# Patient Record
Sex: FEMALE | Race: WHITE | NOT HISPANIC OR LATINO | Employment: OTHER | URBAN - METROPOLITAN AREA
[De-identification: names, ages, dates, MRNs, and addresses within clinical notes are randomized per-mention and may not be internally consistent; named-entity substitution may affect disease eponyms.]

---

## 2017-01-11 LAB — INR PPP: 1.7

## 2017-01-12 ENCOUNTER — ANTICOAGULATION VISIT (OUTPATIENT)
Dept: CARDIOLOGY | Facility: CLINIC | Age: 82
End: 2017-01-12

## 2017-01-12 DIAGNOSIS — I48.0 PAROXYSMAL ATRIAL FIBRILLATION (HCC): ICD-10-CM

## 2017-01-12 NOTE — PATIENT INSTRUCTIONS
Pt daughter advised to increase dose to 3.75mg SuTuThSa, and 2.5 mg MWF.  Recheck in one week.  Understanding verbalized.

## 2017-01-26 ENCOUNTER — ANTICOAGULATION VISIT (OUTPATIENT)
Dept: CARDIOLOGY | Facility: CLINIC | Age: 82
End: 2017-01-26

## 2017-01-26 LAB — INR PPP: 2

## 2017-02-09 ENCOUNTER — ANTICOAGULATION VISIT (OUTPATIENT)
Dept: CARDIOLOGY | Facility: CLINIC | Age: 82
End: 2017-02-09

## 2017-02-09 DIAGNOSIS — I48.91 ATRIAL FIBRILLATION, UNSPECIFIED TYPE (HCC): ICD-10-CM

## 2017-02-09 LAB — INR PPP: 2.5

## 2017-02-22 RX ORDER — WARFARIN SODIUM 2.5 MG/1
TABLET ORAL
Qty: 180 TABLET | Refills: 0 | Status: SHIPPED | OUTPATIENT
Start: 2017-02-22 | End: 2017-06-07 | Stop reason: SDUPTHER

## 2017-02-23 ENCOUNTER — ANTICOAGULATION VISIT (OUTPATIENT)
Dept: CARDIOLOGY | Facility: CLINIC | Age: 82
End: 2017-02-23

## 2017-02-23 LAB — INR PPP: 3.6

## 2017-03-02 ENCOUNTER — ANTICOAGULATION VISIT (OUTPATIENT)
Dept: CARDIOLOGY | Facility: CLINIC | Age: 82
End: 2017-03-02

## 2017-03-02 DIAGNOSIS — I48.91 ATRIAL FIBRILLATION, UNSPECIFIED TYPE (HCC): ICD-10-CM

## 2017-03-02 LAB — INR PPP: 2.4

## 2017-03-09 ENCOUNTER — ANTICOAGULATION VISIT (OUTPATIENT)
Dept: CARDIOLOGY | Facility: CLINIC | Age: 82
End: 2017-03-09

## 2017-03-09 LAB — INR PPP: 2.5

## 2017-03-17 NOTE — PROGRESS NOTES
Adjusted patient medication as indicated in the anticoagulation tracking system    Electronically signed by Yamilka Chisholm PA-C

## 2017-03-23 ENCOUNTER — ANTICOAGULATION VISIT (OUTPATIENT)
Dept: CARDIOLOGY | Facility: CLINIC | Age: 82
End: 2017-03-23

## 2017-03-23 LAB — INR PPP: 3.7

## 2017-03-30 ENCOUNTER — ANTICOAGULATION VISIT (OUTPATIENT)
Dept: CARDIOLOGY | Facility: CLINIC | Age: 82
End: 2017-03-30

## 2017-03-30 LAB — INR PPP: 2.9

## 2017-04-13 ENCOUNTER — ANTICOAGULATION VISIT (OUTPATIENT)
Dept: CARDIOLOGY | Facility: CLINIC | Age: 82
End: 2017-04-13

## 2017-04-13 LAB — INR PPP: 2.1

## 2017-05-04 ENCOUNTER — ANTICOAGULATION VISIT (OUTPATIENT)
Dept: CARDIOLOGY | Facility: CLINIC | Age: 82
End: 2017-05-04

## 2017-05-04 DIAGNOSIS — I48.91 ATRIAL FIBRILLATION, UNSPECIFIED TYPE (HCC): ICD-10-CM

## 2017-05-04 LAB — INR PPP: 1.3

## 2017-05-11 ENCOUNTER — ANTICOAGULATION VISIT (OUTPATIENT)
Dept: CARDIOLOGY | Facility: CLINIC | Age: 82
End: 2017-05-11

## 2017-05-11 LAB — INR PPP: 1.7

## 2017-05-25 ENCOUNTER — ANTICOAGULATION VISIT (OUTPATIENT)
Dept: CARDIOLOGY | Facility: CLINIC | Age: 82
End: 2017-05-25

## 2017-05-25 DIAGNOSIS — I48.91 ATRIAL FIBRILLATION, UNSPECIFIED TYPE (HCC): ICD-10-CM

## 2017-05-25 LAB — INR PPP: 2

## 2017-06-07 ENCOUNTER — ANTICOAGULATION VISIT (OUTPATIENT)
Dept: CARDIOLOGY | Facility: CLINIC | Age: 82
End: 2017-06-07

## 2017-06-07 ENCOUNTER — OFFICE VISIT (OUTPATIENT)
Dept: CARDIOLOGY | Facility: CLINIC | Age: 82
End: 2017-06-07

## 2017-06-07 VITALS
WEIGHT: 121.8 LBS | BODY MASS INDEX: 22.41 KG/M2 | HEART RATE: 77 BPM | DIASTOLIC BLOOD PRESSURE: 76 MMHG | SYSTOLIC BLOOD PRESSURE: 120 MMHG | HEIGHT: 62 IN

## 2017-06-07 DIAGNOSIS — I10 ESSENTIAL HYPERTENSION: ICD-10-CM

## 2017-06-07 DIAGNOSIS — I48.20 CHRONIC ATRIAL FIBRILLATION (HCC): Primary | ICD-10-CM

## 2017-06-07 DIAGNOSIS — I49.5 TACHY-BRADY SYNDROME (HCC): ICD-10-CM

## 2017-06-07 DIAGNOSIS — I48.91 ATRIAL FIBRILLATION, UNSPECIFIED TYPE (HCC): Primary | ICD-10-CM

## 2017-06-07 LAB — INR PPP: 1.7

## 2017-06-07 PROCEDURE — 99213 OFFICE O/P EST LOW 20 MIN: CPT | Performed by: INTERNAL MEDICINE

## 2017-06-07 PROCEDURE — 93288 INTERROG EVL PM/LDLS PM IP: CPT | Performed by: INTERNAL MEDICINE

## 2017-06-07 RX ORDER — WARFARIN SODIUM 2.5 MG/1
TABLET ORAL
Qty: 180 TABLET | Refills: 0 | Status: SHIPPED | OUTPATIENT
Start: 2017-06-07 | End: 2017-09-02 | Stop reason: SDUPTHER

## 2017-06-07 RX ORDER — CHOLECALCIFEROL (VITAMIN D3) 125 MCG
5 CAPSULE ORAL NIGHTLY PRN
COMMUNITY
End: 2019-01-28

## 2017-06-07 RX ORDER — LISINOPRIL AND HYDROCHLOROTHIAZIDE 20; 12.5 MG/1; MG/1
1 TABLET ORAL DAILY
COMMUNITY
End: 2019-01-16

## 2017-06-07 RX ORDER — LEVOTHYROXINE SODIUM 175 UG/1
175 TABLET ORAL DAILY
COMMUNITY
End: 2019-01-16

## 2017-06-07 RX ORDER — OMEPRAZOLE 20 MG/1
20 CAPSULE, DELAYED RELEASE ORAL DAILY
COMMUNITY

## 2017-06-07 RX ORDER — UBIDECARENONE 100 MG
100 CAPSULE ORAL DAILY
COMMUNITY

## 2017-06-07 RX ORDER — CARBOXYMETHYLCELLULOSE SODIUM 5 MG/ML
1 SOLUTION/ DROPS OPHTHALMIC 4 TIMES DAILY PRN
COMMUNITY

## 2017-06-07 RX ORDER — FERROUS SULFATE 325(65) MG
325 TABLET ORAL
COMMUNITY

## 2017-06-07 NOTE — PROGRESS NOTES
Adelaide Deutsch  6/11/1924  [phone]      06/07/2017    [facility] CARDIOLOGY     Kiana Alcocer MD  [address] [place] [address] [place] [id number]    Chief Complaint   Patient presents with   • Atrial Fibrillation   • Slow Heart Rate         PROBLEM LIST:  1. Tachybrady syndrome:  a. Holter monitor, 11/22/2006, showing heart rate ; average 59 BPM with 29 PVCs, 2,735 PACs, with short episodes of nonsustained atrial tachycardia.   b. Echocardiogram, 11/22/2006: Moderate LAE, EF 65%  c. Persantine 01/04/2007, low probability of ischemia, EF 68%.   d. Implantation of a dual-chamber permanent pacemaker, 07/12/2007 ([facility]).   e. Interrogation of pacemaker in September 2007, consistent with atrial fibrillation and atrial flutter with subsequent initiation of Coumadin and Toprol due to rapid ventricular rates.   f. Conversion to normal sinus rhythm with treatment of sotalol therapy, 10/19/2007.  g. Chronic atrial fibrillation with adequate ventricular rate control on metoprolol.   2. Hypertension.  3. Hyperlipidemia.   4. Anemia.   5. Migraines.   6. Arthritis.   7. Gastroesophageal reflux disease/hiatal hernia.   8. Hypothyroidism.   9. Osteoporosis.   10. Status post cataract removal.    Allergies  No Known Allergies    Current Medications    Current Outpatient Prescriptions:   •  carboxymethylcellulose (RETAINE CMC) 0.5 % solution, 3 (Three) Times a Day As Needed for Dry Eyes., Disp: , Rfl:   •  coenzyme Q10 100 MG capsule, Take 100 mg by mouth Daily., Disp: , Rfl:   •  Cyanocobalamin (VITAMIN B-12 IJ), Inject  as directed Every 30 (Thirty) Days., Disp: , Rfl:   •  ferrous sulfate 325 (65 FE) MG tablet, Take 325 mg by mouth Daily With Breakfast., Disp: , Rfl:   •  levothyroxine (SYNTHROID, LEVOTHROID) 175 MCG tablet, Take 175 mcg by mouth Daily., Disp: , Rfl:   •  lisinopril-hydrochlorothiazide (PRINZIDE,ZESTORETIC) 20-12.5 MG per tablet, Take 1 tablet by mouth Daily., Disp: , Rfl:   •   "melatonin 5 MG tablet tablet, Take 5 mg by mouth., Disp: , Rfl:   •  metoprolol tartrate (LOPRESSOR) 50 MG tablet, Take 1 tablet by mouth 2 (Two) Times a Day., Disp: 180 tablet, Rfl: 3  •  Multiple Vitamin (MULTI VITAMIN DAILY PO), Take  by mouth., Disp: , Rfl:   •  Multiple Vitamins-Minerals (PRESERVISION AREDS 2 PO), Take  by mouth., Disp: , Rfl:   •  omeprazole (priLOSEC) 20 MG capsule, Take 20 mg by mouth Daily., Disp: , Rfl:   •  warfarin (COUMADIN) 2.5 MG tablet, Take as directed, Disp: 180 tablet, Rfl: 0    History of Present Illness   HPI    Pt presents for follow up of chronic atrial fibrillation, tachy-beatriz syndrome, and PM check. Since we last saw the pt, she was in the hospital in April due to dizziness. Her medications were adjusted as her BP was low, and now she is back to baseline. Pt denies any AF episodes, SOB, CP, LH, and dizziness. Denies any  bleeding, or TIA/CVA symptoms. Her INRs are checked by Leena.     ROS:  General:  Denies fatigue, weight gain or loss  Cardiovascular:  Denies CP, PND, syncope, near syncope, edema or palpitations.  Pulmonary:  Denies WILLIS, cough, or wheezing      Vitals:    06/07/17 1357   BP: 120/76   BP Location: Right arm   Patient Position: Sitting   Pulse: 77   Weight: 121 lb 12.8 oz (55.2 kg)   Height: 62\" (157.5 cm)     PE:  General: NAD  Neck: no JVD, no carotid bruits, no TM  Heart RRR, NL S1, S2, S4 present, no rubs, murmurs  Lungs: CTA, no wheezes, rhonchi, or rales  Abd: soft, non-tender, NL BS  Ext: No musculoskeletal deformities, no edema, cyanosis, or clubbing  Psych: normal mood and affect    Diagnostic Data:  Procedures     PM check: normal function, 4.5 years on battery, adequate ventricular rates    1. Chronic atrial fibrillation    2. Tachy-beatriz syndrome    3. Essential hypertension          Plan:  1) Chronic atrial fibrillation: asymptomatic and rate controlled  Continue present medications.   2) Anticoagulation  Continue warfarin  3) HTN- actually has " become more hypotensive on medications, better off Clonidine.     F/up in 12 months    Scribed for Vadim Jarrett MD by Yamilka Chisholm PA-C. 6/7/2017  2:30 PM    I, LINA Li, personally performed the services face to face as described in this documentation and as scribed by the above named individual in my presence, and it is both accurate and complete.  6/7/2017  2:33 PM

## 2017-06-22 ENCOUNTER — ANTICOAGULATION VISIT (OUTPATIENT)
Dept: CARDIOLOGY | Facility: CLINIC | Age: 82
End: 2017-06-22

## 2017-06-22 LAB — INR PPP: 1.9

## 2017-07-06 ENCOUNTER — ANTICOAGULATION VISIT (OUTPATIENT)
Dept: CARDIOLOGY | Facility: CLINIC | Age: 82
End: 2017-07-06

## 2017-07-06 LAB — INR PPP: 2.2

## 2017-07-20 ENCOUNTER — ANTICOAGULATION VISIT (OUTPATIENT)
Dept: CARDIOLOGY | Facility: CLINIC | Age: 82
End: 2017-07-20

## 2017-07-20 DIAGNOSIS — I48.91 ATRIAL FIBRILLATION, UNSPECIFIED TYPE (HCC): ICD-10-CM

## 2017-07-20 LAB — INR PPP: 1.6

## 2017-07-27 ENCOUNTER — ANTICOAGULATION VISIT (OUTPATIENT)
Dept: CARDIOLOGY | Facility: CLINIC | Age: 82
End: 2017-07-27

## 2017-07-27 DIAGNOSIS — I48.91 ATRIAL FIBRILLATION, UNSPECIFIED TYPE (HCC): ICD-10-CM

## 2017-07-27 LAB — INR PPP: 2.1

## 2017-08-10 ENCOUNTER — ANTICOAGULATION VISIT (OUTPATIENT)
Dept: CARDIOLOGY | Facility: CLINIC | Age: 82
End: 2017-08-10

## 2017-08-10 DIAGNOSIS — I48.0 PAROXYSMAL ATRIAL FIBRILLATION (HCC): ICD-10-CM

## 2017-08-10 LAB — INR PPP: 2.3

## 2017-08-24 ENCOUNTER — ANTICOAGULATION VISIT (OUTPATIENT)
Dept: CARDIOLOGY | Facility: CLINIC | Age: 82
End: 2017-08-24

## 2017-08-24 LAB — INR PPP: 1.9

## 2017-09-02 DIAGNOSIS — I48.91 ATRIAL FIBRILLATION, UNSPECIFIED TYPE (HCC): ICD-10-CM

## 2017-09-05 RX ORDER — WARFARIN SODIUM 2.5 MG/1
TABLET ORAL
Qty: 180 TABLET | Refills: 0 | Status: SHIPPED | OUTPATIENT
Start: 2017-09-05 | End: 2018-05-23 | Stop reason: SDUPTHER

## 2017-09-07 ENCOUNTER — ANTICOAGULATION VISIT (OUTPATIENT)
Dept: PHARMACY | Facility: HOSPITAL | Age: 82
End: 2017-09-07

## 2017-09-07 LAB — INR PPP: 1.5

## 2017-09-07 NOTE — PROGRESS NOTES
Anticoagulation Clinic - Remote Progress Note  REMOTE LAB  Frequency of Monitoring (PST, ONLY): 14 days    Indication: Afib  Referring Provider: Luiza  Initial Warfarin Start Date:   Goal INR: 2-3  Current Drug Interactions: Omeprazole, MVI, CoQ10, Levothyroxine  CHADS-VASc:     Diet: Green beans, glenroy slaw each week  Alcohol: None  Tobacco: None  OTC Pain Medication: Tylenol    INR History:  Date 8/24 9/7          Total Weekly Dose 27.5 27.5          INR 1.9 1.5          Notes  First clinic             Phone Interview:  Tablet Strength: pt has 5mg tablet  Current Maintenance Dose: 5mg daily except 2.5 MWF     Patient Findings      Negatives Signs/symptoms of thrombosis, Signs/symptoms of bleeding, Laboratory test error suspected, Change in health, Change in alcohol use, Change in activity, Upcoming invasive procedure, Emergency department visit, Upcoming dental procedure, Missed doses, Extra doses, Change in medications, Change in diet/appetite, Hospital admission, Bruising, Other complaints     Comments Spoke with patient's daughter, Mikayla, whom does not report any changes in Vit K diet. She states it is possible that her mother may have missed a dose if it fell out of the pillbox or if she took it out and laid it somewhere else.        Patient Contact Info: Mikayla Johnson (daughter) # [phone]      Plan:  1. INR is SUBtherapeutic today. Spoke with patient's daughter, Mikayla, whom said she is not aware of any missed doses, however, it is possible. Given patient's age, hx of therapeutic INR on this dose, and pt due for higher dose tonight and through weekend, will instruct patient's daughter to continue 5mg daily except 2.5mg MWF. A boost would be an increase of 9.1%, given patient's age and hx, hesitant to do this at this time. Will consider dose increase if patient remains subtherapeutic next week.  2. Repeat INR on Wednesday 9/13.  3. Pt declines warfarin refills.  4. Verbal information provided over the  phone to daughter, Mikayla. Mikayla RBV dosing instructions, expresses understanding, and has no further questions at this time.    Rhonda Singh, PharmD  9/7/2017  3:20 PM

## 2017-09-13 ENCOUNTER — ANTICOAGULATION VISIT (OUTPATIENT)
Dept: PHARMACY | Facility: HOSPITAL | Age: 82
End: 2017-09-13

## 2017-09-13 LAB — INR PPP: 2.2

## 2017-09-13 NOTE — PROGRESS NOTES
Anticoagulation Clinic - Remote Progress Note  ALERE [facility] MONITOR  Frequency of Monitoring (PST, ONLY): 14 days    Indication: Afib  Referring Provider: Luiza  Initial Warfarin Start Date:   Goal INR: 2-3  Current Drug Interactions: Omeprazole, MVI, CoQ10, Levothyroxine  CHADS-VASc: 4 [HTN, Age>75, Female]    Diet: Green beans, glenroy slaw each week  Alcohol: None  Tobacco: None  OTC Pain Medication: Tylenol    INR History:  Date 8/24 9/7 9/13         Total Weekly Dose 27.5 27.5 27.5 mg         INR 1.9 1.5 2.2         Notes  First clinic             Phone Interview:  Tablet Strength: pt has 5mg tablet  Current Maintenance Dose: 5mg daily except 2.5 MWF  Patient Findings      Negatives Signs/symptoms of thrombosis, Signs/symptoms of bleeding, Laboratory test error suspected, Change in health, Change in alcohol use, Change in activity, Upcoming invasive procedure, Emergency department visit, Upcoming dental procedure, Missed doses, Extra doses, Change in medications, Change in diet/appetite, Hospital admission, Bruising, Other complaints     Comments Phone Interview with Patient's Daughter; She notes that she went back to last week's pillbox/records and saw that the patient had taken 2.5mg on Woodrow 9/3 instead of 5mg- likely contributed to subtherapeutic INR. Her dosage for this week has been corrected to what was instructed.     Patient Contact Info: Mikayla Johnson (daughter) # [phone]      Plan:  1. INR is therapeutic today (2.2). Instructed patient to continue warfarin 5mg daily except 2.5mg MWF.  2. Repeat INR in 2 weeks (closest possible for her is 9/28).  3. Pt declines warfarin refills.  4. Verbal information provided over the phone to daughterMikayla. Mikayla RBV dosing instructions, expresses understanding, and has no further questions at this time.    Maicol Sterling, Pharmacy Intern   Rhonda Singh, PharmD  9/13/2017  1:15 PM         IRhonda, PharmD, have reviewed the note in full and agree  with the assessment and plan.  09/13/17  4:49 PM

## 2017-09-28 ENCOUNTER — ANTICOAGULATION VISIT (OUTPATIENT)
Dept: PHARMACY | Facility: HOSPITAL | Age: 82
End: 2017-09-28

## 2017-09-28 LAB — INR PPP: 1.6

## 2017-09-28 NOTE — PROGRESS NOTES
Anticoagulation Clinic - Remote Progress Note  ALERE [facility] MONITOR  Frequency of Monitoring (PST, ONLY): 14 days    Indication: Afib  Referring Provider: Luiza  Initial Warfarin Start Date:   Goal INR: 2-3  Current Drug Interactions: Omeprazole, MVI, CoQ10, Levothyroxine  CHADS-VASc: 4 [HTN, Age>75, Female]    Diet: Green beans, glenroy slaw each week  Alcohol: None  Tobacco: None  OTC Pain Medication: Tylenol    INR History:  Date 8/24 9/7 9/13 9/28        Total Weekly Dose 27.5 27.5 27.5 mg 27.5        INR 1.9 1.5 2.2 1.6        Notes  First clinic   missed dose          Phone Interview:  Tablet Strength: pt has 5mg tablet  Current Maintenance Dose: 5mg daily except 2.5 MWF  Patient Findings      Negatives Signs/symptoms of thrombosis, Signs/symptoms of bleeding, Laboratory test error suspected, Change in health, Change in alcohol use, Change in activity, Upcoming invasive procedure, Emergency department visit, Upcoming dental procedure, Missed doses, Extra doses, Change in medications, Change in diet/appetite, Hospital admission, Bruising, Other complaints     Comments Phone Interview with Patient's Daughter; She reports that the patient missed a 5mg dose last Thursday and did not boost. Discussed that the patient can take a missed dose of warfarin within 12 hours of the missed dose, or can call the clinic if they have questions about missed dose. Patient's daughter received our telephone number and was encouraged to call with any changes in medications, questions or concerns.      Patient Contact Info: Mikayla Alex (daughter) # [phone]      Plan:  1. INR is SUBtherapeutic today (1.6). Instructed patient to take scheduled dose tonight 5mg then boost tomorrows dose to 3.75mg  (~4% increase) then continue warfarin 5mg daily except 2.5mg MWF.  2. Repeat INR in 1 week on 10/5.  3. Pt declines warfarin refills.  4. Verbal information provided over the phone to demetriceMikayla. Mikayla RBV dosing instructions,  expresses understanding, and has no further questions at this time.    Rhonda Singh, PharmD   9/28/2017  3:09 PM

## 2017-10-05 ENCOUNTER — ANTICOAGULATION VISIT (OUTPATIENT)
Dept: PHARMACY | Facility: HOSPITAL | Age: 82
End: 2017-10-05

## 2017-10-05 LAB — INR PPP: 2.4

## 2017-10-05 NOTE — PROGRESS NOTES
Anticoagulation Clinic - Remote Progress Note  ALERE [facility] MONITOR  Frequency of Monitoring (PST, ONLY): 14 days    Indication: Afib  Referring Provider: Luiza  Initial Warfarin Start Date:   Goal INR: 2-3  Current Drug Interactions: Omeprazole, MVI, CoQ10, Levothyroxine  CHADS-VASc: 4 [HTN, Age>75, Female]    Diet: Green beans, glenroy slaw each week  Alcohol: None  Tobacco: None  OTC Pain Medication: Tylenol    INR History:  Date 8/24 9/7 9/13 9/28 10/5       Total Weekly Dose 27.5 27.5 27.5 mg 27.5 28.5       INR 1.9 1.5 2.2 1.6 2.4       Notes  First clinic   missed dose          Phone Interview:  Tablet Strength: pt has 5mg tablet  Current Maintenance Dose: 5mg daily except 2.5 MWF  Patient Findings      Negatives Signs/symptoms of thrombosis, Signs/symptoms of bleeding, Laboratory test error suspected, Change in health, Change in alcohol use, Change in activity, Upcoming invasive procedure, Emergency department visit, Upcoming dental procedure, Missed doses, Extra doses, Change in medications, Change in diet/appetite, Hospital admission, Bruising, Other complaints     Patient Contact Info: Mikayla Johnson (daughter) # [phone]      Plan:  1. INR is therapeutic today (2.4). Instructed continue warfarin 5mg daily except 2.5mg MWF.  2. Repeat INR in 1 week on 10/12 to ensure WNL.  3. Pt declines warfarin refills.  4. Verbal information provided over the phone to daughterMikayla. Mikayla RBV dosing instructions, expresses understanding, and has no further questions at this time.    Rhonda Singh, PharmD   10/5/2017  3:59 PM

## 2017-10-12 ENCOUNTER — ANTICOAGULATION VISIT (OUTPATIENT)
Dept: PHARMACY | Facility: HOSPITAL | Age: 82
End: 2017-10-12

## 2017-10-12 LAB — INR PPP: 2.3

## 2017-10-12 NOTE — PROGRESS NOTES
Anticoagulation Clinic - Remote Progress Note  ALERE [facility] MONITOR  Frequency of Monitoring (PST, ONLY): 14 days    Indication: Afib  Referring Provider: Luiza  Initial Warfarin Start Date:   Goal INR: 2-3  Current Drug Interactions: Omeprazole, MVI, CoQ10, Levothyroxine  CHADS-VASc: 4 [HTN, Age>75, Female]    Diet: Green beans, glenroy slaw each week  Alcohol: None  Tobacco: None  OTC Pain Medication: Tylenol    INR History:  Date 8/24 9/7 9/13 9/28 10/5 10/12      Total Weekly Dose 27.5 mg 27.5 mg 27.5 mg 27.5  mg 28.5  mg 28.5 mg      INR 1.9 1.5 2.2 1.6 2.4 2.3      Notes  First clinic   missed dose          Phone Interview:  Tablet Strength: pt has 5mg tablet  Current Maintenance Dose: 5mg daily except 2.5mg MWF      Patient Findings      Positives Upcoming invasive procedure     Negatives Signs/symptoms of thrombosis, Signs/symptoms of bleeding, Laboratory test error suspected, Change in health, Change in alcohol use, Change in activity, Emergency department visit, Upcoming dental procedure, Missed doses, Extra doses, Change in medications, Change in diet/appetite, Hospital admission, Bruising, Other complaints     Comments Mrs. Deutsch is anticipating an eye procedure soon, however, she is waiting for the opthamologist to call and schedule her. Advised daughter to give them our phone number and for her to also call [facility] to let us know schedule and plan for procedure. At that point we can discuss if a warfarin dose hold is necessary and discuss this with the ophthalmologist and cardiologist.     Patient Contact Info: Mikayla Johnson (daughter) # [phone]      Plan:  1. INR is therapeutic today (2.3). Instructed continue warfarin 5mg daily except 2.5mg MWF.  2. Repeat INR in 2 weeks  3. Pt declines warfarin refills.  4. Verbal information provided over the phone to daughterMikayla. Mikayla RBV dosing instructions, expresses understanding, and has no further questions at this time.    [place]  HALI Valle, CHRIS  Rhonda Singh, PharmD  10/12/2017  1:15 PM      I, Rhonda Singh, PharmD, have reviewed the note in full and agree with the assessment and plan.  10/12/17  3:27 PM

## 2017-10-16 ENCOUNTER — TELEPHONE (OUTPATIENT)
Dept: PHARMACY | Facility: HOSPITAL | Age: 82
End: 2017-10-16

## 2017-10-16 NOTE — TELEPHONE ENCOUNTER
Called Dr. Fenton's office re: upcoming procedure. They recommend stopping warfarin 3 days prior to surgery, restarting that same night. CHADS VASc = 4, so pt should not require bridge therapy. Surgery is scheduled for Tuesday 10/31, so will instruct Mrs. Deutsch on the following dosing recommendations:    Saturday 10/28 - HOLD  [place] 10/29 - HOLD  Monday 10/30 - HOLD  Tuesday 10/31 - surgery, warfarin 5mg  Wednesday 11/1 - warfarin 5mg (BOOST)  Thursday 11/2 - warfarin 5mg  Friday 11/3 - warfarin 2.5mg (resume maintenance dosing)  Saturday 11/4 - 5mg  Sunday 11/5 - 5mg  Monday 11/6 - repeat INR    Will review plan with pt's daughter next week.    Ann Cowden Mayer, PharmD  10/16/2017  11:50 AM

## 2017-10-26 ENCOUNTER — ANTICOAGULATION VISIT (OUTPATIENT)
Dept: PHARMACY | Facility: HOSPITAL | Age: 82
End: 2017-10-26

## 2017-10-26 LAB — INR PPP: 2.1

## 2017-10-26 NOTE — PROGRESS NOTES
Anticoagulation Clinic - Remote Progress Note  ALERE HOME MONITOR  Frequency of Monitorin days    Indication: Afib  Referring Provider: Luiza  Initial Warfarin Start Date:   Goal INR: 2-3  Current Drug Interactions: Omeprazole, MVI, CoQ10, Levothyroxine  CHADS-VASc: 4 [HTN, Age>75, Female]    Diet: Green beans, glenroy slaw each week  Alcohol: None  Tobacco: None  OTC Pain Medication: Tylenol    INR History:  Date 8/24 9/7 9/13 9/28 10/5 10/12 10/26     Total Weekly Dose 27.5 mg 27.5 mg 27.5 mg 27.5  mg 28.5  mg 28.5 mg 28.5 mg     INR 1.9 1.5 2.2 1.6 2.4 2.3 2.1     Notes  First clinic   missed dose          Phone Interview:  Tablet Strength: pt has 5mg tablet  Current Maintenance Dose: 5mg daily except 2.5mg MWF  Patient Findings      Positives Upcoming invasive procedure, Change in diet/appetite     Negatives Signs/symptoms of thrombosis, Signs/symptoms of bleeding, Laboratory test error suspected, Change in health, Change in alcohol use, Change in activity, Emergency department visit, Upcoming dental procedure, Missed doses, Extra doses, Change in medications, Hospital admission, Bruising, Other complaints     Comments Having eye surgery with Dr. Fenton on 10/31, was told to stop warfarin on Sat 10/28, isn't sure when to start back. Will call [facility] office ([phone]) to see what the plan of action is.   Losing weight, PCP recommended drinking one Boost/Ensure a day       Patient Contact Info: Mikayla Alex (daughter) # [phone]      Plan:  1. INR is therapeutic today (2.1). Instructed continue warfarin 5mg daily except 2.5mg MWF.  2. Repeat INR in 2 weeks or sooner depending on plan of action post eye surgery.  3. Pt declines warfarin refills.  4. Verbal information provided over the phone to Mikayla suresh. Mikayla RBV dosing instructions, expresses understanding, and has no further questions at this time.    Loco Valle, Kendall  10/26/2017  1:52 PM    [place]:  Spoke with  Dr. Fenton's office -- Mrs. Deutsch will plan to follow the schedule below:  Saturday 10/28 - HOLD  [place] 10/29 - HOLD  Monday 10/30 - HOLD  Tuesday 10/31 - HOLD (procedure)  Wednesday 11/1 - warfarin 5mg (BOOST)  Thursday 11/2 - warfarin 5mg  Friday 11/3 - warfarin 5mg (BOOST)  Saturday 11/4 - 5mg  Sunday 11/5 - 5mg  Monday 11/6 - repeat INR, consider resumption of prior maintenance dose    ILeena, [facility], have reviewed the note in full and agree with the assessment and plan.  10/27/17  12:08 PM

## 2017-11-07 ENCOUNTER — ANTICOAGULATION VISIT (OUTPATIENT)
Dept: PHARMACY | Facility: HOSPITAL | Age: 82
End: 2017-11-07

## 2017-11-07 LAB — INR PPP: 1.7

## 2017-11-07 NOTE — PROGRESS NOTES
Anticoagulation Clinic - Remote Progress Note  ALERE HOME MONITOR  Frequency of Monitorin days    Indication: Afib  Referring Provider: Luiza  Initial Warfarin Start Date:   Goal INR: 2-3  Current Drug Interactions: Omeprazole, MVI, CoQ10, Levothyroxine  CHADS-VASc: 4 [HTN, Age>75, Female]    Diet: Green beans, glenroy slaw each week  Alcohol: None  Tobacco: None  OTC Pain Medication: Tylenol    INR History:  Date 8/24 9/7 9/13 9/28 10/5 10/12 10/26 10/7    Total Weekly Dose 27.5 mg 27.5 mg 27.5 mg 27.5  mg 28.5  mg 27.5 mg 27.5 mg 27.5mg    INR 1.9 1.5 2.2 1.6 2.4 2.3 2.1 1.7    Notes  First clinic   missed dose boost   Hold eye procedure      Phone Interview:  Tablet Strength: pt has 5mg tablet  Current Maintenance Dose: 5mg daily except 2.5mg MWF  Patient Findings      Positives Other complaints     Negatives Signs/symptoms of thrombosis, Signs/symptoms of bleeding, Laboratory test error suspected, Change in health, Change in alcohol use, Change in activity, Upcoming invasive procedure, Emergency department visit, Upcoming dental procedure, Missed doses, Extra doses, Change in medications, Change in diet/appetite, Hospital admission, Bruising     Comments Patient followed dose hold on 10/28 prior to eye surgery with Dr. Fenton on 10/31. Her daughter confirmed boosted dose after per dosing recommendations. Continues Boost drink once daily.     Patient Contact Info: Mikayla Johnson (daughter) # [phone]      Plan:  1. INR is SUBtherapeutic today (1.7)- following dose hold for procedure on 10/31. Given patient received boosted doses last week and chadsvasc= 4, will proceed cautiously due to age, therefore, instructed Mikayla to continue warfarin 5mg daily except 2.5mg MWF.   2. Repeat INR on Friday to ensure back WNL.  3. Pt declines warfarin refills.  4. Verbal information provided over the phone to daughter, Mikayla. Mikayla RBV dosing instructions, expresses understanding, and has no further questions at  this time.      Rhonda Singh, PharmD  11/7/2017  9:05 AM

## 2017-11-10 ENCOUNTER — ANTICOAGULATION VISIT (OUTPATIENT)
Dept: PHARMACY | Facility: HOSPITAL | Age: 82
End: 2017-11-10

## 2017-11-10 LAB — INR PPP: 2.1

## 2017-11-10 NOTE — PROGRESS NOTES
Anticoagulation Clinic - Remote Progress Note  ALERE HOME MONITOR  Frequency of Monitorin days    Indication: Afib  Referring Provider: Luiza  Initial Warfarin Start Date:   Goal INR: 2-3  Current Drug Interactions: Omeprazole, MVI, CoQ10, Levothyroxine  CHADS-VASc: 4 [HTN, Age>75, Female]    Diet: Green beans, glenroy slaw each week  Alcohol: None  Tobacco: None  OTC Pain Medication: Tylenol    INR History:  Date 8/24 9/7 9/13 9/28 10/5 10/12 10/26 11/6 11/10   Total Weekly Dose 27.5 mg 27.5 mg 27.5 mg 27.5  mg 28.5  mg 27.5 mg 27.5 mg 25mg 30mg   INR 1.9 1.5 2.2 1.6 2.4 2.3 2.1 1.7 2.1   Notes  First clinic   missed dose boost   Hold eye procedure      Date            Total Weekly Dose 27.5mg           INR            Notes              Phone Interview:  Tablet Strength: pt has 5mg tablet  Current Maintenance Dose: 5mg daily except 2.5mg MWF  Patient Findings      Negatives Signs/symptoms of thrombosis, Signs/symptoms of bleeding, Laboratory test error suspected, Change in health, Change in alcohol use, Change in activity, Upcoming invasive procedure, Emergency department visit, Upcoming dental procedure, Missed doses, Extra doses, Change in medications, Change in diet/appetite, Hospital admission, Bruising, Other complaints     Comments Spoke to Mrs. Deutsch's dtr Mikayla, Mrs. Deutsch is feeling pretty well after the procedure. Mikayla denied any recent diet or medication changes.     Patient Contact Info: Mikayla Johnson (daughter) # [phone]      Plan:  1. INR is therapeutic today (2.1)- instructed Mikayla to continue warfarin 5mg daily except 2.5mg MWF.   2. Repeat INR in 1 week on .  3. Pt declines warfarin refills.  4. Verbal information provided over the phone to Mikayla suresh. Mikayla RBV dosing instructions, expresses understanding with teach back, and has no further questions at this time.    Sam Ramos, Pharmacy Intern  11/10/2017  1:55 PM        Rhonda SHIN, PharmD, have reviewed the  note in full and agree with the assessment and plan.  11/10/17  3:00 PM

## 2017-11-16 ENCOUNTER — ANTICOAGULATION VISIT (OUTPATIENT)
Dept: PHARMACY | Facility: HOSPITAL | Age: 82
End: 2017-11-16

## 2017-11-16 LAB — INR PPP: 2.3

## 2017-11-16 NOTE — PROGRESS NOTES
Anticoagulation Clinic - Remote Progress Note  ALERE HOME MONITOR  Frequency of Monitorin days    Indication: Afib  Referring Provider: Luiza  Initial Warfarin Start Date:   Goal INR: 2-3  Current Drug Interactions: Omeprazole, MVI, CoQ10, Levothyroxine  CHADS-VASc: 4 [HTN, Age>75, Female]    Diet: Green beans, glenroy slaw each week  Alcohol: None  Tobacco: None  OTC Pain Medication: Tylenol    INR History:  Date 8/24 9/7 9/13 9/28 10/5 10/12 10/26 11/6 11/10   Total Weekly Dose 27.5 mg 27.5 mg 27.5 mg 27.5  mg 28.5  mg 27.5 mg 27.5 mg 25mg 30mg   INR 1.9 1.5 2.2 1.6 2.4 2.3 2.1 1.7 2.1   Notes  First clinic   missed dose boost   Hold eye procedure      Date            Total Weekly Dose 27.5mg           INR 2.3           Notes              Phone Interview:  Tablet Strength: pt has 5mg tablet  Current Maintenance Dose: 5mg daily except 2.5mg MWF  Patient Findings      Negatives Signs/symptoms of thrombosis, Signs/symptoms of bleeding, Laboratory test error suspected, Change in health, Change in alcohol use, Change in activity, Upcoming invasive procedure, Emergency department visit, Upcoming dental procedure, Missed doses, Extra doses, Change in medications, Change in diet/appetite, Hospital admission, Bruising, Other complaints         Patient Contact Info: Mikayla Johnson (daughter) # [phone]      Plan:  1. INR is therapeutic today (2.3)- instructed Mikayla to continue warfarin 5mg daily except 2.5mg MWF.   2. Repeat INR in 2 weeks on .  3. Pt declines warfarin refills.  4. Verbal information provided over the phone to Mikayla suresh. Mikayla RBV dosing instructions, expresses understanding with teach back, and has no further questions at this time.    Sam Ramos, Pharmacy Intern  2017  1:51 PM      Rhonda SHIN, PharmD, have reviewed the note in full and agree with the assessment and plan.  17  10:04 AM

## 2017-11-30 ENCOUNTER — ANTICOAGULATION VISIT (OUTPATIENT)
Dept: PHARMACY | Facility: HOSPITAL | Age: 82
End: 2017-11-30

## 2017-11-30 LAB — INR PPP: 1.5

## 2017-11-30 NOTE — PROGRESS NOTES
"Anticoagulation Clinic - Remote Progress Note  [employer]  Frequency of Monitorin days    Indication: Afib  Referring Provider: Luiza  Initial Warfarin Start Date:   Goal INR: 2-3  Current Drug Interactions: Omeprazole, MVI, CoQ10, Levothyroxine  CHADS-VASc: 4 [HTN, Age>75, Female]    Diet: Green beans, glenroy slaw each week  Alcohol: None  Tobacco: None  OTC Pain Medication: Tylenol    INR History:  Date 8/24 9/7 9/13 9/28 10/5 10/12 10/26 11/6 11/10   Total Weekly Dose 27.5 mg 27.5 mg 27.5 mg 27.5  mg 28.5  mg 27.5 mg 27.5 mg 25mg 30mg   INR 1.9 1.5 2.2 1.6 2.4 2.3 2.1 1.7 2.1   Notes  First clinic   missed dose boost   Hold eye procedure      Date           Total Weekly Dose 27.5mg 27.5?          INR 2.3 1.5          Notes  doubled up? Held x1 boost           Phone Interview:  Tablet Strength: pt has 5mg tablet  Current Maintenance Dose: 5mg daily except 2.5mg MWF  Patient Findings      Positives Missed doses, Extra doses     Negatives Signs/symptoms of thrombosis, Signs/symptoms of bleeding, Laboratory test error suspected, Change in health, Change in alcohol use, Change in activity, Upcoming invasive procedure, Emergency department visit, Upcoming dental procedure, Change in diet/appetite, Hospital admission, Bruising, Other complaints     Comments Spoken to pt's daughter Mikayla over the phone. Mikayla mentioned that when she checked pt's pill box on  night (), both the medication for  and  were gone. She suspected that patient might have mixed up the medications and took both days' supply at the same time, but she could not confirm that's what happened. Subsequently she held pt's warfarin dose on .       Patient Contact Info: Mikayla Johnson (daughter) # [phone]      Plan:  1. INR is subtherapeutic today (1.5) followed by \"doubled up\" dose and held dose x1. Instructed Mikayla to give Mrs. Deutsch 5mg tonight and tomorrow, (9.1% increase) and then continue " warfarin 5mg daily except 2.5mg MWF.   2. Repeat INR in 1 week on 12/5.  3. Mikayla declines warfarin refills.  4. Verbal information provided over the phone to daughter, Mikayal. Mikayla RBV dosing instructions, expresses understanding with teach back, and has no further questions at this time.    Sam Ramos, Pharmacy Intern  11/30/2017  11:26 AM      I, Rhonda Singh, PharmD, have reviewed the note in full and agree with the assessment and plan.  11/30/17  1:39 PM

## 2017-12-05 ENCOUNTER — ANTICOAGULATION VISIT (OUTPATIENT)
Dept: PHARMACY | Facility: HOSPITAL | Age: 82
End: 2017-12-05

## 2017-12-05 DIAGNOSIS — I48.91 ATRIAL FIBRILLATION, UNSPECIFIED TYPE (HCC): Primary | ICD-10-CM

## 2017-12-05 LAB — INR PPP: 3.19

## 2017-12-05 NOTE — PROGRESS NOTES
Anticoagulation Clinic - Remote Progress Note  [employer]  Frequency of Monitorin days    Indication: Afib  Referring Provider: Luiza  Initial Warfarin Start Date:   Goal INR: 2-3  Current Drug Interactions: Omeprazole, MVI, CoQ10, Levothyroxine  CHADS-VASc: 4 [HTN, Age>75, Female]    Diet: Green beans, glenroy slaw each week  Alcohol: None  Tobacco: None  OTC Pain Medication: Tylenol    INR History:  Date 8/24 9/7 9/13 9/28 10/5 10/12 10/26 11/6 11/10   Total Weekly Dose 27.5 mg 27.5 mg 27.5 mg 27.5  mg 28.5  mg 27.5 mg 27.5 mg 25mg 30mg   INR 1.9 1.5 2.2 1.6 2.4 2.3 2.1 1.7 2.1   Notes  First clinic   missed dose boost   Hold eye procedure      Date          Total Weekly Dose 27.5mg 27.5mg? 30 mg 27.5 mg        INR 2.3 1.5 3.19         Notes  doubled up? held x1 boost           Phone Interview:  Tablet Strength: pt has 5mg tablet  Current Maintenance Dose: 5mg daily except 2.5mg MWF    Patient Contact Info: Mikayla Johnson (daughter) # [phone]      Plan:  1. INR is slightly supratherapeutic today.  Instructed Mikayla to have her mother eat some GLV tonight (broccoli and salad) and continue prior maintenance dose, warfarin 5mg daily except 2.5mg MWF for now. INR increased significantly since last week, but Mrs. Deutsch missed one dose and has been stable on current maintenance dose previously.  2. Repeat INR in 1 week to ensure it stabilizes.  3. Verbal information provided over the phone to daughter, Mikayla. Mikayla RBV dosing instructions, expresses understanding with teach back, and has no further questions at this time.    Leena Davis [facility]  2017  3:25 PM

## 2017-12-11 ENCOUNTER — ANTICOAGULATION VISIT (OUTPATIENT)
Dept: PHARMACY | Facility: HOSPITAL | Age: 82
End: 2017-12-11

## 2017-12-11 LAB — INR PPP: 2.7

## 2017-12-11 NOTE — PROGRESS NOTES
Anticoagulation Clinic - Remote Progress Note  [employer]  Frequency of Monitorin days    Indication: Afib  Referring Provider: Luiza  Initial Warfarin Start Date:   Goal INR: 2-3  Current Drug Interactions: Omeprazole, MVI, CoQ10, Levothyroxine  CHADS-VASc: 4 [HTN, Age>75, Female]    Diet: Green beans, glenroy slaw each week  Alcohol: None  Tobacco: None  OTC Pain Medication: Tylenol    INR History:  Date 8/24 9/7 9/13 9/28 10/5 10/12 10/26 11/6 11/10   Total Weekly Dose 27.5 mg 27.5 mg 27.5 mg 27.5  mg 28.5  mg 27.5 mg 27.5 mg 25mg 30mg   INR 1.9 1.5 2.2 1.6 2.4 2.3 2.1 1.7 2.1   Notes  First clinic   missed dose boost   Hold eye procedure      Date         Total Weekly Dose 27.5mg 27.5mg? 30 mg 27.5 mg        INR 2.3 1.5 3.19 2.7        Notes  doubled up? held x1 boost           Phone Interview:  Tablet Strength: pt has 5mg tablet  Current Maintenance Dose: 5mg daily except 2.5mg MWF  Patient Findings      Positives Missed doses, Extra doses     Negatives Signs/symptoms of thrombosis, Signs/symptoms of bleeding, Laboratory test error suspected, Change in health, Change in alcohol use, Change in activity, Upcoming invasive procedure, Emergency department visit, Upcoming dental procedure, Change in medications, Change in diet/appetite, Hospital admission, Bruising, Other complaints     Comments Patient's daughter Mikayla disclosed that the patient missed a dose of warfarin last night and only took 2.5mg. Mikayla has already given her 5mg today to offset it. Discussed that if patient misses a dose within 12 hr can take it, however, typically don't double up doses.     Patient Contact Info: Mikayla Johnson (daughter) # [phone]      Plan:  1. INR is therapeutic today.  Given patient has already received 5mg today, instructed Mikayla to continue prior maintenance dose, warfarin 5mg daily except 2.5mg MWF for now.   2. Repeat INR in 1.5 weeks to ensure WNL.  3. Verbal information  provided over the phone to daughter, Mikayla. Mikayla RBV dosing instructions, expresses understanding with teach back, and has no further questions at this time.    Rhonda Singh, PharmD  12/11/2017  2:55 PM

## 2017-12-20 ENCOUNTER — ANTICOAGULATION VISIT (OUTPATIENT)
Dept: PHARMACY | Facility: HOSPITAL | Age: 82
End: 2017-12-20

## 2017-12-20 LAB — INR PPP: 3

## 2017-12-20 NOTE — PROGRESS NOTES
Anticoagulation Clinic - Remote Progress Note  [employer]  Frequency of Monitorin days    Indication: Afib  Referring Provider: Luiza  Initial Warfarin Start Date:   Goal INR: 2-3  Current Drug Interactions: Omeprazole, MVI, CoQ10, Levothyroxine  CHADS-VASc: 4 [HTN, Age>75, Female]    Diet: Green beans, glenroy slaw each week  Alcohol: None  Tobacco: None  OTC Pain Medication: Tylenol    INR History:  Date 8/24 9/7 9/13 9/28 10/5 10/12 10/26 11/6 11/10   Total Weekly Dose 27.5 mg 27.5 mg 27.5 mg 27.5  mg 28.5  mg 27.5 mg 27.5 mg 25mg 30mg   INR 1.9 1.5 2.2 1.6 2.4 2.3 2.1 1.7 2.1   Notes  First clinic   missed dose boost   Hold eye procedure      Date        Total Weekly Dose 27.5mg 27.5mg? 30 mg 27.5 mg 27.5mg       INR 2.3 1.5 3.19 2.7 3.0       Notes  doubled up? held x1 boost           Phone Interview:  Tablet Strength: pt has 5mg tablet  Current Maintenance Dose: 5mg daily except 2.5mg MWF      Patient Findings      Negatives Signs/symptoms of thrombosis, Signs/symptoms of bleeding, Laboratory test error suspected, Change in health, Change in alcohol use, Change in activity, Upcoming invasive procedure, Emergency department visit, Upcoming dental procedure, Missed doses, Extra doses, Change in medications, Change in diet/appetite, Hospital admission, Bruising, Other complaints   Patient Contact Info: Mikayla Johnson (daughter) # [phone]      Plan:  1. INR is therapeutic today at 3.0. Instructed patient's daughter to have patient continue warfarin 5mg daily except 2.5mg MWF   2. Repeat INR in 2 weeks  3. Verbal information provided over the phone to daughterMikayla. Mikayla RBV dosing instructions, expresses understanding with teach back, and has no further questions at this time.  4. Patient's daughter declines warfarin refills     Jodi Bello, Pharmacy Technician  2017  1:32 PM       Rhonda SHIN, PharmD, have reviewed the note in full and  agree with the assessment and plan.  12/20/17  2:51 PM

## 2017-12-26 DIAGNOSIS — I49.5 TACHY-BRADY SYNDROME (HCC): ICD-10-CM

## 2017-12-26 RX ORDER — METOPROLOL TARTRATE 50 MG/1
TABLET, FILM COATED ORAL
Qty: 180 TABLET | Refills: 2 | Status: SHIPPED | OUTPATIENT
Start: 2017-12-26 | End: 2018-05-20 | Stop reason: SDUPTHER

## 2018-01-03 ENCOUNTER — ANTICOAGULATION VISIT (OUTPATIENT)
Dept: PHARMACY | Facility: HOSPITAL | Age: 83
End: 2018-01-03

## 2018-01-03 LAB — INR PPP: 3

## 2018-01-03 NOTE — PROGRESS NOTES
Anticoagulation Clinic - Remote Progress Note  [employer]  Frequency of Monitorin days    Indication: Afib  Referring Provider: Luiza  Initial Warfarin Start Date:   Goal INR: 2-3  Current Drug Interactions: Omeprazole, MVI, CoQ10, Levothyroxine  CHADS-VASc: 4 [HTN, Age>75, Female]    Diet: Green beans, glenroy slaw each week  Alcohol: None  Tobacco: None  OTC Pain Medication: Tylenol    INR History:  Date 8/24 9/7 9/13 9/28 10/5 10/12 10/26 11/6 11/10   Total Weekly Dose 27.5 mg 27.5 mg 27.5 mg 27.5  mg 28.5  mg 27.5 mg 27.5 mg 25mg 30mg   INR 1.9 1.5 2.2 1.6 2.4 2.3 2.1 1.7 2.1   Notes  First clinic   missed dose boost   Hold eye procedure      Date 11/16 11/30 12/5 12/11 12/20 1/3      Total Weekly Dose 27.5mg 27.5mg? 30 mg 27.5 mg 27.5mg 27.5  mg      INR 2.3 1.5 3.19 2.7 3.0 3.0      Notes  doubled up? held x1 boost           Phone Interview:  Tablet Strength: pt has 5mg tablet  Current Maintenance Dose: 5mg daily except 2.5mg MWF  Patient Contact Info: Mikayla Johnson (daughter) # [phone]    Patient Findings      Negatives Signs/symptoms of thrombosis, Signs/symptoms of bleeding, Laboratory test error suspected, Change in health, Change in alcohol use, Change in activity, Upcoming invasive procedure, Emergency department visit, Upcoming dental procedure, Missed doses, Extra doses, Change in medications, Change in diet/appetite, Hospital admission, Bruising, Other complaints   Plan:  1. INR is therapeutic today at 3.0. Instructed patient's daughter to have patient continue warfarin 5mg daily except 2.5mg MWF   2. Repeat INR in 2 weeks. ()  3. Verbal information provided over the phone to daughterMikayla. Mikayla RBV dosing instructions, expresses understanding with teach back, and has no further questions at this time.  4. Patient's daughter declines warfarin refills     Jodi Bello, Pharmacy Technician  1/3/2018  10:22 AM     I, Rhonda Singh, PharmD, have reviewed the note in  full and agree with the assessment and plan.  01/03/18  4:20 PM

## 2018-01-18 ENCOUNTER — ANTICOAGULATION VISIT (OUTPATIENT)
Dept: PHARMACY | Facility: HOSPITAL | Age: 83
End: 2018-01-18

## 2018-01-18 LAB — INR PPP: 2.7

## 2018-01-18 NOTE — PROGRESS NOTES
Anticoagulation Clinic - Remote Progress Note  [employer]  Frequency of Monitorin days    Indication: Afib  Referring Provider: Luiza  Initial Warfarin Start Date:   Goal INR: 2-3  Current Drug Interactions: Omeprazole, MVI, CoQ10, Levothyroxine  CHADS-VASc: 4 [HTN, Age>75, Female]    Diet: Green beans, glenroy slaw each week  Alcohol: None  Tobacco: None  OTC Pain Medication: Tylenol    INR History:  Date 8/24 9/7 9/13 9/28 10/5 10/12 10/26 11/6 11/10   Total Weekly Dose 27.5 mg 27.5 mg 27.5 mg 27.5  mg 28.5  mg 27.5 mg 27.5 mg 25mg 30mg   INR 1.9 1.5 2.2 1.6 2.4 2.3 2.1 1.7 2.1   Notes  First clinic   missed dose boost   Hold eye procedure      Date 11/16 11/30 12/5 12/11 12/20 1/3/18 1/18     Total Weekly Dose 27.5mg 27.5mg? 30 mg 27.5 mg 27.5 mg 27.5  mg 27.5 mg       INR 2.3 1.5 3.19 2.7 3.0 3.0 2.7     Notes  doubled up? held x1 boost           Phone Interview:  Tablet Strength: pt has 5mg tablet  Current Maintenance Dose: 5mg daily except 2.5mg MonWedFri      Patient Contact Info: Mikayla Johnson (daughter) # [phone]    Lab Contact Info: Traci    Plan:  1. INR is therapeutic today (2.7). Instructed patient's daughter to have patient continue warfarin 5mg daily except 2.5mg MonWedFri   2. Repeat INR in 2 weeks. Mikayla has doctor's appointments on  so it may be either  or  when she retests her mother's INR.  3. Verbal information provided over the phone to daughterMikayla. Mikayla RBV dosing instructions, expresses understanding with teach back, and has no further questions at this time.  4. Patient's daughter declines warfarin refills     Loco Valle CPhT  2018  2:59 PM    I, Loco Freedman [facility], have reviewed the note in full and agree with the assessment and plan.  18  9:52 AM

## 2018-01-31 LAB — INR PPP: 2.5

## 2018-02-01 ENCOUNTER — ANTICOAGULATION VISIT (OUTPATIENT)
Dept: PHARMACY | Facility: HOSPITAL | Age: 83
End: 2018-02-01

## 2018-02-01 DIAGNOSIS — I48.91 ATRIAL FIBRILLATION, UNSPECIFIED TYPE (HCC): ICD-10-CM

## 2018-02-01 NOTE — PROGRESS NOTES
Anticoagulation Clinic - Remote Progress Note  [employer]  Frequency of Monitorin days    Indication: Afib  Referring Provider: Luiza  Initial Warfarin Start Date:   Goal INR: 2-3  Current Drug Interactions: Omeprazole, MVI, CoQ10, Levothyroxine  CHADS-VASc: 4 [HTN, Age>75, Female]    Diet: Green beans, glenroy slaw each week  Alcohol: None  Tobacco: None  OTC Pain Medication: Tylenol    INR History:  Date 8/24 9/7 9/13 9/28 10/5 10/12 10/26 11/6 11/10   Total Weekly Dose 27.5 mg 27.5 mg 27.5 mg 27.5  mg 28.5  mg 27.5 mg 27.5 mg 25mg 30mg   INR 1.9 1.5 2.2 1.6 2.4 2.3 2.1 1.7 2.1   Notes  First clinic   missed dose boost   Hold eye procedure      Date 11/16 11/30 12/5 12/11 12/20 1/3/18 1/18 1/31    Total Weekly Dose 27.5mg 27.5mg? 30 mg 27.5 mg 27.5 mg 27.5  mg 27.5 mg   27.5mg    INR 2.3 1.5 3.19 2.7 3.0 3.0 2.7 2.5    Notes  doubled up? held x1 boost           Phone Interview:  Tablet Strength: pt has 5mg tablet  Current Maintenance Dose: 5mg daily except 2.5mg MonWedFri    Patient Findings      Negatives Signs/symptoms of thrombosis, Signs/symptoms of bleeding, Laboratory test error suspected, Change in health, Change in alcohol use, Change in activity, Upcoming invasive procedure, Emergency department visit, Upcoming dental procedure, Missed doses, Extra doses, Change in medications, Change in diet/appetite, Hospital admission, Bruising, Other complaints     Patient Contact Info: Mikayla Alex (daughter) # [phone]    Lab Contact Info: Traci    Plan:  1. INR was therapeutic on  at 2.5 Instructed patient's daughter to have patient continue warfarin 5mg daily except 2.5mg MonWedFri   2. Repeat INR in 2 weeks. ()  3. Verbal information provided over the phone to daughter, Mikayla. Mikayla RBV dosing instructions, expresses understanding with teach back, and has no further questions at this time.  4. Patient's daughter declines the need for warfarin refills at this time.     Jodi HANDLEY  [place]  2/1/2018  8:19 AM    I, Meredith Riddle, [facility], have reviewed the note in full and agree with the assessment and plan.  02/01/18  1:20 PM

## 2018-02-14 ENCOUNTER — ANTICOAGULATION VISIT (OUTPATIENT)
Dept: PHARMACY | Facility: HOSPITAL | Age: 83
End: 2018-02-14

## 2018-02-14 LAB — INR PPP: 3.5

## 2018-02-14 NOTE — PROGRESS NOTES
Anticoagulation Clinic - Remote Progress Note  [employer]  Frequency of Monitorin days    Indication: Afib  Referring Provider: Luiza  Initial Warfarin Start Date:   Goal INR: 2-3  Current Drug Interactions: Omeprazole, MVI, CoQ10, Levothyroxine  CHADS-VASc: 4 [HTN, Age>75, Female]    Diet: Green beans, glenroy slaw each week  Alcohol: None  Tobacco: None  OTC Pain Medication: Tylenol    INR History:  Date 8/24 9/7 9/13 9/28 10/5 10/12 10/26 11/6 11/10   Total Weekly Dose 27.5 mg 27.5 mg 27.5 mg 27.5  mg 28.5  mg 27.5 mg 27.5 mg 25mg 30mg   INR 1.9 1.5 2.2 1.6 2.4 2.3 2.1 1.7 2.1   Notes  First clinic   missed dose boost   Hold eye procedure      Date 11/16 11/30 12/5 12/11 12/20 1/3/18 1/18 1/31 2/14    Total Weekly Dose 27.5mg 27.5mg? 30 mg 27.5 mg 27.5 mg 27.5  mg 27.5 mg   27.5mg 27.5mg 25mg   INR 2.3 1.5 3.19 2.7 3.0 3.0 2.7 2.5 3.5    Notes  doubled up? held x1 boost            Phone Interview:  Tablet Strength: pt has 5mg tablet  Current Maintenance Dose: 5mg daily except 2.5mg MonWedFri    Patient Findings      Negatives Signs/symptoms of thrombosis, Signs/symptoms of bleeding, Laboratory test error suspected, Change in health, Change in alcohol use, Change in activity, Upcoming invasive procedure, Emergency department visit, Upcoming dental procedure, Missed doses, Extra doses, Change in medications, Change in diet/appetite, Hospital admission, Bruising, Other complaints     Patient Contact Info: Mikayla Alex (daughter) # [phone]    Lab Contact Info: Traci    Plan:  1. INR is supratherapeutic today at 3.5. After conferring with a pharmacist, I instructed patient's daughter to have patient hold tonight's dose (~9% decrease), then continue warfarin 5mg daily except 2.5mg MonWedFri.   2. Repeat INR in 1 week.  3. Verbal information provided over the phone to daughterMikayla. Mikayla RBV dosing instructions, expresses understanding with teach back, and has no further questions at this  time.  4. Patient's daughter declines the need for warfarin refills at this time.     Jodi Bello  2/14/2018  3:48 PM      IRhonda, PharmD, have reviewed the note in full and agree with the assessment and plan.  02/14/18  5:22 PM

## 2018-02-21 ENCOUNTER — ANTICOAGULATION VISIT (OUTPATIENT)
Dept: PHARMACY | Facility: HOSPITAL | Age: 83
End: 2018-02-21

## 2018-02-21 LAB — INR PPP: 2.2

## 2018-02-21 NOTE — PROGRESS NOTES
Anticoagulation Clinic - Remote Progress Note  [employer]  Frequency of Monitorin days    Indication: Afib  Referring Provider: Luiza  Initial Warfarin Start Date:   Goal INR: 2-3  Current Drug Interactions: Omeprazole, MVI, CoQ10, Levothyroxine  CHADS-VASc: 4 [HTN, Age>75, Female]    Diet: Green beans, glenroy slaw each week  Alcohol: None  Tobacco: None  OTC Pain Medication: Tylenol    INR History:  Date 8/24 9/7 9/13 9/28 10/5 10/12 10/26 11/6 11/10   Total Weekly Dose 27.5 mg 27.5 mg 27.5 mg 27.5  mg 28.5  mg 27.5 mg 27.5 mg 25mg 30mg   INR 1.9 1.5 2.2 1.6 2.4 2.3 2.1 1.7 2.1   Notes  First clinic   missed dose boost   Hold eye procedure      Date 11/16 11/30 12/5 12/11 12/20 1/3/18 1/18 1/31 2/14 2/21    Total Weekly Dose 27.5mg 27.5mg? 30 mg 27.5 mg 27.5 mg 27.5  mg 27.5 mg   27.5mg 27.5mg 25mg 27.5mg   INR 2.3 1.5 3.19 2.7 3.0 3.0 2.7 2.5 3.5 2.2    Notes  doubled up? held x1 boost             Phone Interview:  Tablet Strength: pt has 5mg tablet  Current Maintenance Dose: 5mg daily except 2.5mg MonWedFri      Patient Contact Info: Mikayla Johnson (daughter) # [phone]    Lab Contact Info: Traci    Plan:  1. INR is supratherapeutic today at 3.5. Will instruct patient's daughter to have patient return to maintenance dose of warfarin 5mg daily except 2.5mg MonWedFri.   2. Left voice message for patient's daughter, Mikayla, on     Jodi Bello  2018  3:37 PM

## 2018-02-22 NOTE — PROGRESS NOTES
Anticoagulation Clinic - Remote Progress Note  [employer]  Frequency of Monitorin days    Indication: Afib  Referring Provider: Luiza  Initial Warfarin Start Date:   Goal INR: 2-3  Current Drug Interactions: Omeprazole, MVI, CoQ10, Levothyroxine  CHADS-VASc: 4 [HTN, Age>75, Female]    Diet: Green beans, glenroy slaw each week  Alcohol: None  Tobacco: None  OTC Pain Medication: Tylenol    INR History:  Date 8/24 9/7 9/13 9/28 10/5 10/12 10/26 11/6 11/10 11/16 11/30 12/5   Total Weekly Dose 27.5 mg 27.5 mg 27.5 mg 27.5  mg 28.5  mg 27.5 mg 27.5 mg 25mg 30mg 27.5 mg 27.5mg? 30mg   INR 1.9 1.5 2.2 1.6 2.4 2.3 2.1 1.7 2.1 2.3 1.5 3.19   Notes  First clinic   missed dose boost   Hold eye procedure   doubled up? held x1 boost     Date 12/11 12/20 1/3/18 1/18 1/31 2/14 2/22         Total Weekly Dose 27.5 mg 27.5 mg 27.5  mg 27.5 mg   27.5mg 27.5mg 25mg         INR 2.7 3.0 3.0 2.7 2.5 3.5 2.2         Notes       Held 1x           Phone Interview:  Tablet Strength: pt has 5mg tablet  Current Maintenance Dose: 5mg daily except 2.5mg MonWedFri    Patient Findings      Negatives Signs/symptoms of thrombosis, Signs/symptoms of bleeding, Laboratory test error suspected, Change in health, Change in alcohol use, Change in activity, Upcoming invasive procedure, Emergency department visit, Upcoming dental procedure, Missed doses, Extra doses, Change in medications, Change in diet/appetite, Hospital admission, Bruising, Other complaints     Patient Contact Info: Mikayla Johnson (daughter) # [phone]    Lab Contact Info: Traci    Plan:  1. INR is therapeutic today at 2.2 following dose hold (9% decrease). Instructed patient's daughter to have patient resume prior maintenance dose of warfarin 5mg daily except 2.5mg MonWedFri.   2. Repeat INR in 1 week.  3. Verbal information provided over the phone to daughterMikayla. She RBV dosing instructions, expresses understanding with teach back, and has no further questions at this  time.  4. Patient's daughter declines the need for warfarin refills at this time.     Loco Valle, [place]  2/22/2018  4:32 PM    I, Rhonda Singh, PharmD, have reviewed the note in full and agree with the assessment and plan.  02/23/18  11:27 AM

## 2018-02-28 ENCOUNTER — ANTICOAGULATION VISIT (OUTPATIENT)
Dept: PHARMACY | Facility: HOSPITAL | Age: 83
End: 2018-02-28

## 2018-02-28 LAB — INR PPP: 2.9

## 2018-02-28 NOTE — PROGRESS NOTES
Anticoagulation Clinic - Remote Progress Note  [employer]  Frequency of Monitorin days    Indication: Afib  Referring Provider: Luiza  Initial Warfarin Start Date:   Goal INR: 2-3  Current Drug Interactions: Omeprazole, MVI, CoQ10, Levothyroxine  CHADS-VASc: 4 [HTN, Age>75, Female]    Diet: Green beans, glenroy slaw each week  Alcohol: None  Tobacco: None  OTC Pain Medication: Tylenol    INR History:  Date 8/24 9/7 9/13 9/28 10/5 10/12 10/26 11/6 11/10 11/16 11/30 12/5   Total Weekly Dose 27.5 mg 27.5 mg 27.5 mg 27.5  mg 28.5  mg 27.5 mg 27.5 mg 25mg 30mg 27.5 mg 27.5mg? 30mg   INR 1.9 1.5 2.2 1.6 2.4 2.3 2.1 1.7 2.1 2.3 1.5 3.19   Notes  First clinic   missed dose boost   Hold eye procedure   doubled up? held x1 boost     Date 12/11 12/20 1/3/18 1/18 1/31 2/14 2/22 2/28        Total Weekly Dose 27.5 mg 27.5 mg 27.5  mg 27.5 mg   27.5mg 27.5mg 25mg 27.5mg        INR 2.7 3.0 3.0 2.7 2.5 3.5 2.2 2.9        Notes       Held 1x           Phone Interview:  Tablet Strength: pt has 5mg tablet  Current Maintenance Dose: 5mg daily except 2.5mg MonWedFri  Patient Findings      Negatives Signs/symptoms of thrombosis, Signs/symptoms of bleeding, Laboratory test error suspected, Change in health, Change in alcohol use, Change in activity, Upcoming invasive procedure, Emergency department visit, Upcoming dental procedure, Missed doses, Extra doses, Change in medications, Change in diet/appetite, Hospital admission, Bruising, Other complaints     Patient Contact Info: Mikayla Johnson (daughter) # [phone]    Lab Contact Info: Traci    Plan:  1. INR is therapeutic today at 2.9. Instructed patient's daughter to have patient continiue maintenance dose of warfarin 5mg daily except 2.5mg MonWedFri.   2. Repeat INR in 1 week.  3. Verbal information provided over the phone to daughter, Mikayla. She RBV dosing instructions, expresses understanding with teach back, and has no further questions at this time.  4. Patient's  daughter declines the need for warfarin refills at this time.     Jodi Bello  2/28/2018  4:09 PM    Rhonda SHIN, PharmD, have reviewed the note in full and agree with the assessment and plan.  02/28/18  4:43 PM

## 2018-03-07 LAB — INR PPP: 2.4

## 2018-03-08 ENCOUNTER — ANTICOAGULATION VISIT (OUTPATIENT)
Dept: PHARMACY | Facility: HOSPITAL | Age: 83
End: 2018-03-08

## 2018-03-08 NOTE — PROGRESS NOTES
Anticoagulation Clinic - Remote Progress Note  [employer]OR  Frequency of Monitorin days    Indication: Afib  Referring Provider: Luiza  Initial Warfarin Start Date:   Goal INR: 2-3  Current Drug Interactions: Omeprazole, MVI, CoQ10, Levothyroxine  CHADS-VASc: 4 [HTN, Age>75, Female]    Diet: Green beans, glenroy slaw each week  Alcohol: None  Tobacco: None  OTC Pain Medication: Tylenol    INR History:  Date 8/24 9/7 9/13 9/28 10/5 10/12 10/26 11/6 11/10 11/16 11/30 12/5   Total Weekly Dose 27.5 mg 27.5 mg 27.5 mg 27.5  mg 28.5  mg 27.5 mg 27.5 mg 25mg 30mg 27.5 mg 27.5mg? 30mg   INR 1.9 1.5 2.2 1.6 2.4 2.3 2.1 1.7 2.1 2.3 1.5 3.19   Notes  First clinic   missed dose boost   Hold eye procedure   doubled up? held x1 boost     Date 12/11 12/20 1/3/18 1/18 1/31 2/14 2/22 2/28 3/7       Total Weekly Dose 27.5 mg 27.5 mg 27.5  mg 27.5 mg   27.5mg 27.5mg 25mg 25mg 25  mg       INR 2.7 3.0 3.0 2.7 2.5 3.5 2.2 2.9 2.4       Notes       Held 1x           Phone Interview:  Tablet Strength: pt has 5mg tablet  Current Maintenance Dose: 5mg daily except 2.5mg MonWedFri  Patient Contact Info: Mikayla Cell (daughter) # [phone]    Patient Findings      Negatives Signs/symptoms of thrombosis, Signs/symptoms of bleeding, Laboratory test error suspected, Change in health, Change in alcohol use, Change in activity, Upcoming invasive procedure, Emergency department visit, Upcoming dental procedure, Missed doses, Extra doses, Change in medications, Change in diet/appetite, Hospital admission, Bruising, Other complaints     Plan:  1. INR was therapeutic on 3/7 at 2.4, so instructed patient's daughter to have her continue warfarin 5mg daily except 2.5mg MonWedFri.   2. Repeat INR in 2 weeks. (3/21)  3. Verbal information provided over the phone to daughterRogelioa. She RBV dosing instructions, expresses understanding with teach back, and has no further questions at this time.  4. Patient's daughter declines the need for  warfarin refills at this time.     Jodi Bello CPhT  3/8/2018  8:19 AM    [facility], have reviewed the note in full and agree with the assessment and plan.  03/08/18  8:43 AM

## 2018-03-23 ENCOUNTER — ANTICOAGULATION VISIT (OUTPATIENT)
Dept: PHARMACY | Facility: HOSPITAL | Age: 83
End: 2018-03-23

## 2018-03-23 LAB — INR PPP: 3.1

## 2018-03-23 NOTE — PROGRESS NOTES
Anticoagulation Clinic - Remote Progress Note  [employer]OR  Frequency of Monitorin days    Indication: Afib  Referring Provider: Luiza  Initial Warfarin Start Date:   Goal INR: 2-3  Current Drug Interactions: Omeprazole, MVI, CoQ10, Levothyroxine  CHADS-VASc: 4 [HTN, Age>75, Female]    Diet: Green beans, glenroy slaw each week  Alcohol: None  Tobacco: None  OTC Pain Medication: Tylenol    INR History:  Date 8/24 9/7 9/13 9/28 10/5 10/12 10/26 11/6 11/10 11/16 11/30 12/5   Total Weekly Dose 27.5 mg 27.5 mg 27.5 mg 27.5  mg 28.5  mg 27.5 mg 27.5 mg 25mg 30mg 27.5 mg 27.5mg? 30mg   INR 1.9 1.5 2.2 1.6 2.4 2.3 2.1 1.7 2.1 2.3 1.5 3.19   Notes  First clinic   missed dose boost   Hold eye procedure   doubled up? held x1 boost     Date 12/11 12/20 1/3/18 1/18 1/31 2/14 2/22 2/28 3/7 3/23      Total Weekly Dose 27.5 mg 27.5 mg 27.5  mg 27.5 mg   27.5mg 27.5mg 25mg 27.5mg 27.5  mg 27.5 mg      INR 2.7 3.0 3.0 2.7 2.5 3.5 2.2 2.9 2.4 3.1      Notes       Held 1x           Phone Interview:  Tablet Strength: pt has 5mg tablet  Current Maintenance Dose: 5mg daily except 2.5mg MonWedFri  Patient Contact Info: Mikayla Johnson (daughter) # [phone]    Patient Findings     Negatives Signs/symptoms of thrombosis, Signs/symptoms of bleeding, Laboratory test error suspected, Change in health, Change in alcohol use, Change in activity, Upcoming invasive procedure, Emergency department visit, Upcoming dental procedure, Missed doses, Extra doses, Change in medications, Change in diet/appetite, Hospital admission, Bruising, Other complaints     Plan:  1. INR is slightly SUPRAtherapeutic at 3.1. Instructed patient's daughter to decrease her dose to 1.25mg today (4% decrease) then continue warfarin 5mg daily except 2.5mg MonWedFri.   2. Repeat INR on  to ensure back WNL.  3. Verbal information provided over the phone to daughter, Mikayla. She RBV dosing instructions, expresses understanding with teach back, and has no  further questions at this time.  4. Patient's daughter declines the need for warfarin refills at this time.     Rhonda Singh, PharmD  3/23/2018  1:50 PM

## 2018-04-02 ENCOUNTER — ANTICOAGULATION VISIT (OUTPATIENT)
Dept: PHARMACY | Facility: HOSPITAL | Age: 83
End: 2018-04-02

## 2018-04-02 LAB — INR PPP: 2.2

## 2018-04-02 NOTE — PROGRESS NOTES
Anticoagulation Clinic - Remote Progress Note  [employer]OR  Frequency of Monitorin days    Indication: Afib  Referring Provider: Luiza  Initial Warfarin Start Date:   Goal INR: 2-3  Current Drug Interactions: Omeprazole, MVI, CoQ10, Levothyroxine  CHADS-VASc: 4 [HTN, Age>75, Female]    Diet: Green beans, glenroy slaw each week  Alcohol: None  Tobacco: None  OTC Pain Medication: Tylenol    INR History:  Date 8/24 9/7 9/13 9/28 10/5 10/12 10/26 11/6 11/10 11/16 11/30 12/5   Total Weekly Dose 27.5 mg 27.5 mg 27.5 mg 27.5  mg 28.5  mg 27.5 mg 27.5 mg 25mg 30mg 27.5 mg 27.5mg? 30mg   INR 1.9 1.5 2.2 1.6 2.4 2.3 2.1 1.7 2.1 2.3 1.5 3.19   Notes  First clinic   missed dose boost   Hold eye procedure   doubled up? held x1 boost     Date 12/11 12/20 1/3/18 1/18 1/31 2/14 2/22 2/28 3/7 3/23 4/2     Total Weekly Dose 27.5 mg 27.5 mg 27.5  mg 27.5 mg   27.5mg 27.5mg 25mg 27.5mg 27.5  mg 27.5 mg 27.5mg     INR 2.7 3.0 3.0 2.7 2.5 3.5 2.2 2.9 2.4 3.1 2.2     Notes       Held 1x    1 lowered dose       Phone Interview:  Tablet Strength: pt has 5mg tablet  Current Maintenance Dose: 5mg daily except 2.5mg MonWedFri  Patient Contact Info: Mikayla Johnson (daughter) # [phone]      Patient Findings:   Negatives Signs/symptoms of thrombosis, Signs/symptoms of bleeding, Laboratory test error suspected, Change in health, Change in alcohol use, Change in activity, Upcoming invasive procedure, Emergency department visit, Upcoming dental procedure, Missed doses, Extra doses, Change in medications, Change in diet/appetite, Hospital admission, Bruising, Other complaints     Plan:  1. INR is back WNL today at 2.2. Instructed patient's daughter, Mikayla, to have patient continue warfarin 5mg daily except 2.5mg MonWedFri.   2. Repeat INR on   3. Verbal information provided over the phone to daughter, Mikayla. She RBV dosing instructions, expresses understanding with teach back, and has no further questions at this time.  4.  Patient's daughter declines the need for warfarin refills at this time.     Jodi Bello, Pharmacy Technician  4/2/2018  12:12 PM    I, Loco Freedman [facility], have reviewed the note in full and agree with the assessment and plan.  04/02/18  4:05 PM

## 2018-04-11 LAB — INR PPP: 2.9

## 2018-04-12 ENCOUNTER — ANTICOAGULATION VISIT (OUTPATIENT)
Dept: PHARMACY | Facility: HOSPITAL | Age: 83
End: 2018-04-12

## 2018-04-12 NOTE — PROGRESS NOTES
Anticoagulation Clinic - Remote Progress Note  AL[employer]OR  Frequency of Monitorin days    Indication: Afib  Referring Provider: Luiza  Initial Warfarin Start Date:   Goal INR: 2-3  Current Drug Interactions: Omeprazole, MVI, CoQ10, Levothyroxine  CHADS-VASc: 4 [HTN, Age>75, Female]    Diet: Green beans, glenroy slaw each week  Alcohol: None  Tobacco: None  OTC Pain Medication: Tylenol    INR History:  Date 8/24 9/7 9/13 9/28 10/5 10/12 10/26 11/6 11/10 11/16 11/30 12/5   Total Weekly Dose 27.5 mg 27.5 mg 27.5 mg 27.5  mg 28.5  mg 27.5 mg 27.5 mg 25mg 30mg 27.5 mg 27.5mg? 30mg   INR 1.9 1.5 2.2 1.6 2.4 2.3 2.1 1.7 2.1 2.3 1.5 3.19   Notes  First clinic   missed dose boost   Hold eye procedure   doubled up? held x1 boost     Date 12/11 12/20 1/3/18 1/18 1/31 2/14 2/22 2/28 3/7 3/23 4/2 4/11    Total Weekly Dose 27.5 mg 27.5 mg 27.5  mg 27.5 mg   27.5mg 27.5mg 25mg 27.5mg 27.5  mg 27.5 mg 27.5mg 27.5mg    INR 2.7 3.0 3.0 2.7 2.5 3.5 2.2 2.9 2.4 3.1 2.2 2.9    Notes       Held 1x    1 lowered dose       Date    Total Weekly Dose 27.5mg   INR 2.9   Notes      Phone Interview:  Tablet Strength: pt has 5mg tablet  Current Maintenance Dose: 5mg daily except 2.5mg MonWedFri  Patient Contact Info: Mikayla Johnson (daughter) # [phone]      Patient Findings:   Negatives Signs/symptoms of thrombosis, Signs/symptoms of bleeding, Laboratory test error suspected, Change in health, Change in alcohol use, Change in activity, Upcoming invasive procedure, Emergency department visit, Upcoming dental procedure, Missed doses, Extra doses, Change in medications, Change in diet/appetite, Hospital admission, Bruising, Other complaints     Plan:  1. INR is therapeutic today at 2.9. Instructed patient's daughter, Mikayla, to have patient continue warfarin 5mg daily except 2.5mg MonWedFri.   2. Repeat INR in 2 weeks. ()  3. Verbal information provided over the phone to daughter, Mikayla. She RBV dosing instructions,  expresses understanding with teach back, and has no further questions at this time.  4. Patient's daughter declines the need for warfarin refills at this time.     Jodi Bello, Pharmacy Technician  4/12/2018  8:04 AM    I, Reena Moore, PharmD, have reviewed the note in full and agree with the assessment and plan.  04/12/18  9:01 AM

## 2018-04-25 ENCOUNTER — ANTICOAGULATION VISIT (OUTPATIENT)
Dept: PHARMACY | Facility: HOSPITAL | Age: 83
End: 2018-04-25

## 2018-04-25 LAB — INR PPP: 2.8

## 2018-04-25 NOTE — PROGRESS NOTES
Anticoagulation Clinic - Remote Progress Note  ALERE HOME MONITOR  Frequency of Monitorin days    Indication: Afib  Referring Provider: Luiza  Initial Warfarin Start Date:   Goal INR: 2-3  Current Drug Interactions: Omeprazole, MVI, CoQ10, Levothyroxine  CHADS-VASc: 4 [HTN, Age>75, Female]    Diet: Green beans, glenroy slaw each week  Alcohol: None  Tobacco: None  OTC Pain Medication: Tylenol    INR History:  Date 8/24 9/7 9/13 9/28 10/5 10/12 10/26 11/6 11/10 11/16 11/30 12/5   Total Weekly Dose 27.5 mg 27.5 mg 27.5 mg 27.5  mg 28.5  mg 27.5 mg 27.5 mg 25mg 30mg 27.5 mg 27.5mg? 30mg   INR 1.9 1.5 2.2 1.6 2.4 2.3 2.1 1.7 2.1 2.3 1.5 3.19   Notes  First clinic   missed dose boost   Hold eye procedure   doubled up? held x1 boost     Date 12/11 12/20 1/3/18 1/18 1/31 2/14 2/22 2/28 3/7 3/23 4/2 4/11    Total Weekly Dose 27.5 mg 27.5 mg 27.5  mg 27.5 mg   27.5mg 27.5mg 25mg 27.5mg 27.5  mg 27.5 mg 27.5mg 27.5mg    INR 2.7 3.0 3.0 2.7 2.5 3.5 2.2 2.9 2.4 3.1 2.2 2.9    Notes       Held 1x    1 lowered dose       Date    Total Weekly Dose 27.5 mg 27.5 mg   INR 2.9 2.8   Notes       Phone Interview:  Tablet Strength: pt has 5mg tablet  Current Maintenance Dose: 5mg daily except 2.5mg MonWedFri  Patient Contact Info: Mikayla Johnson (daughter) # [phone]      Patient Findings:  Negatives Signs/symptoms of thrombosis, Signs/symptoms of bleeding, Laboratory test error suspected, Change in health, Change in alcohol use, Change in activity, Upcoming invasive procedure, Emergency department visit, Upcoming dental procedure, Missed doses, Extra doses, Change in medications, Change in diet/appetite, Hospital admission, Bruising, Other complaints   Comments Spoke with patient daughter, Mikayla, who denies any changes in patient's health, medications or diet. Mikayla states that patient has taken warfarin as prescribed and has had no missing or extra doses     Plan:  1. INR is therapeutic today () at 2.8. Instructed  patient's daughter, Mikayla, to have patient continue warfarin 5mg daily except 2.5mg MonWedFri.   2. Repeat INR in 2 weeks. (5/9)  3. Verbal information provided over the phone to daughter, Mikayla. She RBV dosing instructions, expresses understanding with teach back, and has no further questions at this time.  4. Patient's daughter declines the need for warfarin refills at this time.     Jodi Bello, Pharmacy Technician  4/25/2018  1:56 PM    IRhonda, PharmD, have reviewed the note in full and agree with the assessment and plan.  04/25/18  2:29 PM

## 2018-05-10 ENCOUNTER — ANTICOAGULATION VISIT (OUTPATIENT)
Dept: PHARMACY | Facility: HOSPITAL | Age: 83
End: 2018-05-10

## 2018-05-10 DIAGNOSIS — I48.91 ATRIAL FIBRILLATION, UNSPECIFIED TYPE (HCC): ICD-10-CM

## 2018-05-10 LAB — INR PPP: 1.9

## 2018-05-10 NOTE — PROGRESS NOTES
Anticoagulation Clinic - Remote Progress Note  ALERE HOME MONITOR  Frequency of Monitorin days    Indication: Afib  Referring Provider: Luiza  Initial Warfarin Start Date:   Goal INR: 2-3  Current Drug Interactions: Omeprazole, MVI, CoQ10, Levothyroxine  CHADS-VASc: 4 [HTN, Age>75, Female]    Diet: Green beans, glenroy slaw each week  Alcohol: None  Tobacco: None  OTC Pain Medication: Tylenol    INR History:  Date 8/24 9/7 9/13 9/28 10/5 10/12 10/26 11/6 11/10 11/16 11/30 12/5   Total Weekly Dose 27.5 mg 27.5 mg 27.5 mg 27.5  mg 28.5  mg 27.5 mg 27.5 mg 25mg 30mg 27.5 mg 27.5mg? 30mg   INR 1.9 1.5 2.2 1.6 2.4 2.3 2.1 1.7 2.1 2.3 1.5 3.19   Notes  First clinic   missed dose boost   Hold eye procedure   doubled up? held x1 boost     Date 12/11 12/20 1/3/18 1/18 1/31 2/14 2/22 2/28 3/7 3/23 4/2 4/11 4/25   Total Weekly Dose 27.5 mg 27.5 mg 27.5  mg 27.5 mg   27.5mg 27.5mg 25mg 27.5mg 27.5  mg 27.5 mg 27.5mg 27.5 mg 27.5mg   INR 2.7 3.0 3.0 2.7 2.5 3.5 2.2 2.9 2.4 3.1 2.2 2.9 2.8   Notes       Held 1x    1 lowered dose       Date 5/10              Total Weekly Dose 25mg              INR 1.9              Notes missed 1x dose, Self adj                Phone Interview:  Tablet Strength: 5mg tablet  Current Maintenance Dose: 5mg daily except 2.5mg MonWedFri  Patient Contact Info: Mikayla Johnson (daughter) # [phone]      Patient Findings   Positives Missed doses   Negatives Signs/symptoms of thrombosis, Signs/symptoms of bleeding, Laboratory test error suspected, Change in health, Change in alcohol use, Change in activity, Upcoming invasive procedure, Emergency department visit, Upcoming dental procedure, Extra doses, Change in medications, Change in diet/appetite, Hospital admission, Bruising, Other complaints   Comments Missed a 5mg dose on , self adjusted on  by taking 5mg instead of normal 2.5mg dose     Plan:  1. INR is SUBtherapeutic today at 1.9. After consulting with Loco Freedman, PharmD,  instructed patient's daughter, Mikayla, to have patient continue warfarin 5mg daily except 2.5mg MonWedFri.   2. Repeat INR in 1 week, 5/17  3. Verbal information provided over the phone to daughter, Mikayla. She RBV dosing instructions, expresses understanding with teach back, and has no further questions at this time.  4. Patient's daughter declines the need for warfarin refills at this time.     Loco Valle CPhT  5/10/2018  4:47 PM    I, Loco Freedman [facility], have reviewed the note in full and agree with the assessment and plan.  05/10/18  5:20 PM

## 2018-05-17 ENCOUNTER — ANTICOAGULATION VISIT (OUTPATIENT)
Dept: PHARMACY | Facility: HOSPITAL | Age: 83
End: 2018-05-17

## 2018-05-17 LAB — INR PPP: 2.3

## 2018-05-17 NOTE — PROGRESS NOTES
Anticoagulation Clinic - Remote Progress Note  ALERE HOME MONITOR  Frequency of Monitorin days    Indication: Afib  Referring Provider: Luiza  Initial Warfarin Start Date:   Goal INR: 2-3  Current Drug Interactions: Omeprazole, MVI, CoQ10, Levothyroxine  CHADS-VASc: 4 [HTN, Age>75, Female]    Diet: Green beans, glenroy slaw each week  Alcohol: None  Tobacco: None  OTC Pain Medication: Tylenol    INR History:  Date 12/11 12/20 1/3/18 1/18 1/31 2/14 2/22 2/28 3/7 3/23 4/2 4/11 4/25   Total Weekly Dose 27.5 mg 27.5 mg 27.5  mg 27.5 mg   27.5mg 27.5mg 25mg 27.5mg 27.5  mg 27.5 mg 27.5mg 27.5 mg 27.5mg   INR 2.7 3.0 3.0 2.7 2.5 3.5 2.2 2.9 2.4 3.1 2.2 2.9 2.8   Notes       Held 1x    1 lowered dose       Date 5/10 5/17             Total Weekly Dose 25mg 27.5mg             INR 1.9 2.3             Notes missed 1x dose, self adj                Phone Interview:  Tablet Strength: 5mg tablet  Current Maintenance Dose: 5mg daily except 2.5mg MonWedFri  Patient Contact Info: Mikayla Johnson (daughter) # [phone]      Patient Findings:   Negatives:   Signs/symptoms of thrombosis, Signs/symptoms of bleeding, Laboratory test error suspected, Change in health, Change in alcohol use, Change in activity, Upcoming invasive procedure, Emergency department visit, Upcoming dental procedure, Missed doses, Extra doses, Change in medications, Change in diet/appetite, Hospital admission, Bruising, Other complaints   Comments:   Patient's daughter denies any missed doses this past week.      Plan:  1. INR is back WNL today at 2.3. Instructed Mrs. Deutsch's daughter to have her continue warfarin 5mg daily except 2.5mg MonWedFri.   2. Repeat INR in 1 week, .  3. Verbal information provided over the phone to daughter, Mikayla. She RBV dosing instructions, expresses understanding with teach back, and has no further questions at this time.  4. Patient's daughter declines the need for warfarin refills at this time.     Jodi Bello,  [facility]  5/17/2018  1:02 PM     Leena SHIN, [facility], have reviewed the note in full and agree with the assessment and plan.  05/17/18  1:33 PM

## 2018-05-20 DIAGNOSIS — I49.5 TACHY-BRADY SYNDROME (HCC): ICD-10-CM

## 2018-05-21 RX ORDER — METOPROLOL TARTRATE 50 MG/1
TABLET, FILM COATED ORAL
Qty: 180 TABLET | Refills: 3 | Status: SHIPPED | OUTPATIENT
Start: 2018-05-21 | End: 2019-01-16 | Stop reason: ALTCHOICE

## 2018-05-23 ENCOUNTER — ANTICOAGULATION VISIT (OUTPATIENT)
Dept: PHARMACY | Facility: HOSPITAL | Age: 83
End: 2018-05-23

## 2018-05-23 DIAGNOSIS — I48.91 ATRIAL FIBRILLATION, UNSPECIFIED TYPE (HCC): ICD-10-CM

## 2018-05-23 LAB — INR PPP: 2.4

## 2018-05-23 RX ORDER — WARFARIN SODIUM 2.5 MG/1
TABLET ORAL
Qty: 180 TABLET | Refills: 0 | Status: CANCELLED | OUTPATIENT
Start: 2018-05-23

## 2018-05-23 NOTE — PROGRESS NOTES
Anticoagulation Clinic - Remote Progress Note  ALERE HOME MONITOR  Frequency of Monitorin days    Indication: Afib  Referring Provider: Luiza  Initial Warfarin Start Date:   Goal INR: 2-3  Current Drug Interactions: Omeprazole, MVI, CoQ10, Levothyroxine  CHADS-VASc: 4 [HTN, Age>75, Female]    Diet: Green beans, glenroy slaw each week  Alcohol: None  Tobacco: None  OTC Pain Medication: Tylenol    INR History:  Date 12/11 12/20 1/3/18 1/18 1/31 2/14 2/22 2/28 3/7 3/23 4/2 4/11 4/25   Total Weekly Dose 27.5 mg 27.5 mg 27.5  mg 27.5 mg   27.5mg 27.5mg 25mg 27.5mg 27.5  mg 27.5 mg 27.5mg 27.5 mg 27.5mg   INR 2.7 3.0 3.0 2.7 2.5 3.5 2.2 2.9 2.4 3.1 2.2 2.9 2.8   Notes       Held 1x    1 lowered dose       Date 5/10 5/17 5/23            Total Weekly Dose 25mg 27.5mg 27.5mg            INR 1.9 2.3 2.4            Notes missed 1x dose, self adj                Phone Interview:  Tablet Strength: 5mg tablet  Current Maintenance Dose: 5mg daily except 2.5mg MonWedFri  Patient Contact Info: Mikayla Johnson (daughter) # [phone]      Patient Findings:  Negatives:   Signs/symptoms of thrombosis, Signs/symptoms of bleeding, Laboratory test error suspected, Change in health, Change in alcohol use, Change in activity, Upcoming invasive procedure, Emergency department visit, Upcoming dental procedure, Missed doses, Extra doses, Change in medications, Change in diet/appetite, Hospital admission, Bruising, Other complaints   Comments:   Spoke with patient's daughter, Mikayla, who denies any changes since last encounter     Plan:  1. INR is therapeutic today () at 2.4. Instructed Mrs. Deutsch's daughter to have her continue warfarin 5mg daily except 2.5mg MonWedFri.   2. Repeat INR in two weeks ().  3. Verbal information provided over the phone to daughterMikayla. She RBV dosing instructions, expresses understanding with teach back, and has no further questions at this time.  4. Patient's daughter requests refills be called into  [facility] mail pharmacy.     Eleanor Patino, [place]  5/23/2018  10:28 AM     I, Rhonda Singh, PharmD, have reviewed the note in full and agree with the assessment and plan.  05/23/18  4:55 PM

## 2018-05-24 RX ORDER — WARFARIN SODIUM 2.5 MG/1
TABLET ORAL
Qty: 180 TABLET | Refills: 0 | Status: SHIPPED | OUTPATIENT
Start: 2018-05-24 | End: 2019-05-08 | Stop reason: SDUPTHER

## 2018-05-24 NOTE — TELEPHONE ENCOUNTER
Spoke with [facility] who says they had an e-prescribe sent to them yesterday. Verified it was for warfarin 2.5mg #180 with no refills (authorized by Dr. Jarrett)

## 2018-06-04 RX ORDER — TIZANIDINE 4 MG/1
4 TABLET ORAL NIGHTLY PRN
COMMUNITY
End: 2019-01-16

## 2018-06-06 ENCOUNTER — ANTICOAGULATION VISIT (OUTPATIENT)
Dept: PHARMACY | Facility: HOSPITAL | Age: 83
End: 2018-06-06

## 2018-06-06 LAB — INR PPP: 3.1

## 2018-06-06 NOTE — PROGRESS NOTES
Anticoagulation Clinic - Remote Progress Note  ALERE HOME MONITOR  Frequency of Monitorin days    Indication: Afib  Referring Provider: Luiza  Initial Warfarin Start Date:   Goal INR: 2-3  Current Drug Interactions: Omeprazole, MVI, CoQ10, Levothyroxine  CHADS-VASc: 4 [HTN, Age>75, Female]    Diet: Green beans, glenroy slaw each week  Alcohol: None  Tobacco: None  OTC Pain Medication: Tylenol    INR History:  Date 12/11 12/20 1/3/18 1/18 1/31 2/14 2/22 2/28 3/7 3/23 4/2 4/11 4/25   Total Weekly Dose 27.5 mg 27.5 mg 27.5  mg 27.5 mg   27.5mg 27.5mg 25mg 27.5mg 27.5  mg 27.5 mg 27.5mg 27.5 mg 27.5mg   INR 2.7 3.0 3.0 2.7 2.5 3.5 2.2 2.9 2.4 3.1 2.2 2.9 2.8   Notes       Held 1x    1 lowered dose       Date 5/10 5/17 5/23 6/6           Total Weekly Dose 25mg 27.5mg 27.5mg 26.25 mg           INR 1.9 2.3 2.4 3.1           Notes missed 1x dose, self adj   Less GLV             Phone Interview:  Tablet Strength: 5mg tablet  Current Maintenance Dose: 5mg daily except 2.5mg MonWedFri  Patient Contact Info: Mikayla Johnson (daughter) # [phone]      Patient Findings   Positives:   Missed doses, Change in diet/appetite   Negatives:   Signs/symptoms of thrombosis, Signs/symptoms of bleeding, Laboratory test error suspected, Change in health, Change in alcohol use, Change in activity, Upcoming invasive procedure, Emergency department visit, Upcoming dental procedure, Extra doses, Change in medications, Hospital admission, Bruising, Other complaints   Comments:   Fewer GLV than usual and boosted dose as discussed below.    From Rhonda Singh's addendum to  notes:     Addendum : Patient's daughter called to report that she was started on tizanidine 4mg QHS to assist with back pain. Additionally she reports that she took 2.5mg on  and possibly missed another dose of warfarin this weekend. Given patient's recent history, instructed her daughter to give her 3.75mg tonight. The plan is to repeat INR on  Wednesday when RN comes to the house.      Plan:  1. INR is slightly SUPRAtherapeutic today at 3.1. Instructed Mrs. Deutsch's daughter, Mikayla, to have her eat a serving of GLV (suggested salad) and then continue warfarin 5mg daily except 2.5mg MonWedFri.   2. Repeat INR in 1 week, 6/13. Mikayla will bring patient in after appt w/Dr Jarrett  3. Verbal information provided over the phone to daughter, Mikayla. She RBV dosing instructions, expresses understanding with teach back, and has no further questions at this time.  4. Patient's daughter declines the need for warfarin refills at this time    Loco Valle, [place]  6/6/2018  1:39 PM     IRhonda, PharmD, have reviewed the note in full and agree with the assessment and plan.  06/06/18  1:49 PM

## 2018-06-13 ENCOUNTER — OFFICE VISIT (OUTPATIENT)
Dept: CARDIOLOGY | Facility: CLINIC | Age: 83
End: 2018-06-13

## 2018-06-13 ENCOUNTER — ANTICOAGULATION VISIT (OUTPATIENT)
Dept: PHARMACY | Facility: HOSPITAL | Age: 83
End: 2018-06-13

## 2018-06-13 VITALS
BODY MASS INDEX: 19.67 KG/M2 | HEIGHT: 63 IN | HEART RATE: 67 BPM | DIASTOLIC BLOOD PRESSURE: 68 MMHG | WEIGHT: 111 LBS | SYSTOLIC BLOOD PRESSURE: 130 MMHG

## 2018-06-13 DIAGNOSIS — I49.5 TACHY-BRADY SYNDROME (HCC): ICD-10-CM

## 2018-06-13 DIAGNOSIS — I10 ESSENTIAL HYPERTENSION: ICD-10-CM

## 2018-06-13 DIAGNOSIS — R06.09 DOE (DYSPNEA ON EXERTION): Primary | ICD-10-CM

## 2018-06-13 DIAGNOSIS — I48.20 CHRONIC ATRIAL FIBRILLATION (HCC): ICD-10-CM

## 2018-06-13 LAB
INR PPP: 3.8 (ref 0.91–1.09)
PROTHROMBIN TIME: 45.8 SECONDS (ref 10–13.8)

## 2018-06-13 PROCEDURE — 93279 PRGRMG DEV EVAL PM/LDLS PM: CPT | Performed by: INTERNAL MEDICINE

## 2018-06-13 PROCEDURE — 85610 PROTHROMBIN TIME: CPT

## 2018-06-13 PROCEDURE — 99214 OFFICE O/P EST MOD 30 MIN: CPT | Performed by: INTERNAL MEDICINE

## 2018-06-13 PROCEDURE — G0463 HOSPITAL OUTPT CLINIC VISIT: HCPCS

## 2018-06-13 PROCEDURE — 36416 COLLJ CAPILLARY BLOOD SPEC: CPT

## 2018-06-13 RX ORDER — FUROSEMIDE 20 MG/1
20 TABLET ORAL DAILY
Qty: 30 TABLET | Refills: 11 | Status: SHIPPED | OUTPATIENT
Start: 2018-06-13 | End: 2018-06-18 | Stop reason: SDUPTHER

## 2018-06-13 NOTE — PROGRESS NOTES
Anticoagulation Clinic - Remote Progress Note  ALERE HOME MONITOR  Frequency of Monitorin days    Indication: Afib  Referring Provider: Luiza  Initial Warfarin Start Date:   Goal INR: 2-3  Current Drug Interactions: Omeprazole, MVI, CoQ10, Levothyroxine  CHADS-VASc: 4 [HTN, Age>75, Female]  Clinic: 2018    Diet: Green beans, glenroy slaw each week (3-4x week)  Alcohol: None  Tobacco: None  OTC Pain Medication: Tylenol    INR History:  Date 12/11 12/20 1/3/18 1/18 1/31 2/14 2/22 2/28 3/7 3/23 4/2 4/11 4/25   Total Weekly Dose 27.5 mg 27.5 mg 27.5  mg 27.5 mg   27.5mg 27.5mg 25mg 27.5mg 27.5  mg 27.5 mg 27.5mg 27.5 mg 27.5mg   INR 2.7 3.0 3.0 2.7 2.5 3.5 2.2 2.9 2.4 3.1 2.2 2.9 2.8   Notes       Held 1x    1 lowered dose       Date 5/10 5/17 5/23 6/6 6/13          Total Weekly Dose 25mg 27.5mg 27.5mg 26.25 mg 27.5mg          INR 1.9 2.3 2.4 3.1 3.8; 4.0          Notes missed 1x dose, self adj   Less GLV 1st in clinic; Less GLV            Phone Interview:  Tablet Strength: 5mg tablet  Current Maintenance Dose: 5mg daily except 2.5mg MonWedFri  Patient Contact Info: Mikayla Johnson (daughter) # [phone]      Patient Findings   Positives:   Change in medications, Change in diet/appetite   Negatives:   Signs/symptoms of thrombosis, Signs/symptoms of bleeding, Laboratory test error suspected, Change in health, Change in alcohol use, Change in activity, Upcoming invasive procedure, Emergency department visit, Upcoming dental procedure, Missed doses, Extra doses, Hospital admission, Bruising, Other complaints   Comments:   Ms. Deutsch tested herself using home monitor in clinic- technique was observed and patient was given feedback. POC test in clinic yielded 3.8. She reports she has eaten less GLV this past week. Her daughter reports she has been cooking for Ms. Deutsch this past week and they have had one serving of salad this week instead of her typical 3-4 servings. Her daughter plans to incorporate GLV into diet.  Discussed Vit K content of foods. Additionally- she is going to start lasix tomorrow.     Plan:  1. INR is SUPRAtherapeutic today at 3.8. Instructed Mrs. Deutsch's daughter, Mikayla, to resume GLV intake and hold dose of warfarin today then continue warfarin 5mg daily except 2.5mg MonWedFri.  2. Repeat INR in 1 week, 6/21.   3. Ms. Deutsch tested herself using home monitor in clinic- technique was observed and patient was given feedback. POC test in clinic yielded 3.8.   4. Verbal and written information provided in clinic to daughter and Mikayla Sneed. She RBV dosing instructions, expresses understanding with teach back, and has no further questions at this time.    Rhonda Singh, PharmD  6/13/2018  10:52 AM

## 2018-06-13 NOTE — PROGRESS NOTES
Adelaide Deutsch  6/11/1924  [phone]      06/13/2018    [facility] CARDIOLOGY     Kiana Alcocer MD  [address] [place] [address] Nadja [address] [address]    Chief Complaint   Patient presents with   • Atrial Fibrillation   • Shortness of Breath   • Dizziness       PROBLEM LIST:  1. Tachybrady syndrome:  a. Holter monitor, 11/22/2006, showing heart rate ; average 59 BPM with 29 PVCs, 2,735 PACs, with short episodes of nonsustained atrial tachycardia.   b. Echocardiogram, 11/22/2006: Moderate LAE, EF 65%  c. Persantine 01/04/2007, low probability of ischemia, EF 68%.   d. Implantation of a dual-chamber permanent pacemaker, 07/12/2007 ([facility]).   e. Interrogation of pacemaker in September 2007, consistent with atrial fibrillation and atrial flutter with subsequent initiation of Coumadin and Toprol due to rapid ventricular rates.   f. Conversion to normal sinus rhythm with treatment of sotalol therapy, 10/19/2007.  g. Chronic atrial fibrillation with adequate ventricular rate control on metoprolol.   2. Hypertension.  3. Hyperlipidemia.   4. Anemia.   5. Migraines.   6. Arthritis.   7. Gastroesophageal reflux disease/hiatal hernia.   8. Hypothyroidism.   9. Osteoporosis.   10. Status post cataract removal.       Allergies  No Known Allergies    Current Medications    Current Outpatient Prescriptions:   •  carboxymethylcellulose (RETAINE CMC) 0.5 % solution, 3 (Three) Times a Day As Needed for Dry Eyes., Disp: , Rfl:   •  coenzyme Q10 100 MG capsule, Take 100 mg by mouth Daily., Disp: , Rfl:   •  Cyanocobalamin (VITAMIN B-12 IJ), Inject  as directed Every 30 (Thirty) Days., Disp: , Rfl:   •  ferrous sulfate 325 (65 FE) MG tablet, Take 325 mg by mouth Daily With Breakfast., Disp: , Rfl:   •  levothyroxine (SYNTHROID, LEVOTHROID) 175 MCG tablet, Take 175 mcg by mouth Daily., Disp: , Rfl:   •  lisinopril-hydrochlorothiazide (PRINZIDE,ZESTORETIC) 20-12.5 MG per tablet, Take 1 tablet by mouth Daily.,  "Disp: , Rfl:   •  melatonin 5 MG tablet tablet, Take 5 mg by mouth., Disp: , Rfl:   •  metoprolol tartrate (LOPRESSOR) 50 MG tablet, TAKE 1 TABLET BY MOUTH TWO  TIMES DAILY, Disp: 180 tablet, Rfl: 3  •  Multiple Vitamin (MULTI VITAMIN DAILY PO), Take  by mouth., Disp: , Rfl:   •  Multiple Vitamins-Minerals (PRESERVISION AREDS 2 PO), Take  by mouth., Disp: , Rfl:   •  omeprazole (priLOSEC) 20 MG capsule, Take 20 mg by mouth Daily., Disp: , Rfl:   •  tiZANidine (ZANAFLEX) 4 MG tablet, Take 4 mg by mouth At Night As Needed for Muscle Spasms., Disp: , Rfl:   •  warfarin (COUMADIN) 2.5 MG tablet, Take 0.5 to 1 tablet by mouth daily or as directed by anticoagulation pharmacist, Disp: 180 tablet, Rfl: 0    History of Present Illness   HPI    Pt presents for follow up of AF/HTN/bradycardia. Since the pt has seen us in clinic last, pt denies any syncope. Denies any hospitalizations, ER visits, bleeding, or TIA/CVA symptoms. Overall feels tired.  BP well controlled at home. Biggest complaint is worsening moderate SOB with minimal exertion. Not assoc with CP or nausea. Does have LH with standing. Resolved with rest. No palps.  Had blood work that was NL. PT INR labile    ROS:  General:  + mild fatigue,+ 7 lb  weight  loss  Cardiovascular:  Denies CP, PND, syncope, near syncope, edema or palpitations.  Pulmonary:  + WILLIS, No cough, or wheezing    Vitals:    06/13/18 0907   BP: 130/68   BP Location: Right arm   Patient Position: Sitting   Pulse: 67   Weight: 50.3 kg (111 lb)   Height: 160 cm (63\")       PE:  Oxygen levels 99%  General: NAD  Neck: no JVD, no carotid bruits, no TM  Heart irreg irreg rate rhythm  NL S1, S2,  no rubs, murmurs  Lungs: CTA, no wheezes, rhonchi, or rales  Abd: soft, non-tender, NL BS  Ext: No musculoskeletal deformities, no edema, cyanosis, or clubbing  Psych: normal mood and affect    Diagnostic Data:  Procedures      1. WILLIS (dyspnea on exertion)    2. Chronic atrial fibrillation    3. Tachy-beatriz " syndrome    4. Essential hypertension        PM Interrogation: NL PM fxn, Nl battery fxn, RV paced 2%, HR stable with no RVR    Review of her blood work was normal    Plan:  1) WILLIS: Will be conservative: try daily daily dose of lasix 20 mg po daily  2) Chronic atrial fibrillation: asymptomatic and rate controlled  Continue present medications.   3) Anticoagulation  Continue warfarin  4) HTN- stable    F/up in 6 months

## 2018-06-18 RX ORDER — FUROSEMIDE 20 MG/1
20 TABLET ORAL DAILY
Qty: 90 TABLET | Refills: 2 | Status: SHIPPED | OUTPATIENT
Start: 2018-06-18 | End: 2019-01-21 | Stop reason: SDUPTHER

## 2018-06-20 ENCOUNTER — ANTICOAGULATION VISIT (OUTPATIENT)
Dept: PHARMACY | Facility: HOSPITAL | Age: 83
End: 2018-06-20

## 2018-06-20 LAB — INR PPP: 3.9

## 2018-06-20 NOTE — PROGRESS NOTES
Anticoagulation Clinic - Remote Progress Note  ALERE HOME MONITOR  Frequency of Monitorin days    Indication: Afib  Referring Provider: Luiza  Initial Warfarin Start Date:   Goal INR: 2-3  Current Drug Interactions: Omeprazole, MVI, CoQ10, Levothyroxine  CHADS-VASc: 4 [HTN, Age>75, Female]  Clinic: 2018    Diet: Green beans, glenroy slaw each week (3-4x week)  Alcohol: None  Tobacco: None  OTC Pain Medication: Tylenol    INR History:  Date 12/11 12/20 1/3/18 1/18 1/31 2/14 2/22 2/28 3/7 3/23 4/2 4/11 4/25   Total Weekly Dose 27.5 mg 27.5 mg 27.5  mg 27.5 mg   27.5mg 27.5mg 25mg 27.5mg 27.5  mg 27.5 mg 27.5mg 27.5 mg 27.5mg   INR 2.7 3.0 3.0 2.7 2.5 3.5 2.2 2.9 2.4 3.1 2.2 2.9 2.8   Notes       Held 1x    1 lowered dose       Date 5/10 5/17 5/23 6/6 6/13          Total Weekly Dose 25mg 27.5mg 27.5mg 26.25 mg 27.5mg          INR 1.9 2.3 2.4 3.1 3.8; 4.0          Notes missed 1x dose, self adj   Less GLV 1st in clinic; Less GLV            Phone Interview:  Tablet Strength: 5mg tablet  Current Maintenance Dose: 5mg daily except 2.5mg MonWedFri  Patient Contact Info: Mikayla Johnson (daughter) # [phone]      Patient Findings   Positives:   Change in medications, Change in diet/appetite   Negatives:   Signs/symptoms of thrombosis, Signs/symptoms of bleeding, Laboratory test error suspected, Change in health, Change in alcohol use, Change in activity, Upcoming invasive procedure, Emergency department visit, Upcoming dental procedure, Missed doses, Extra doses, Hospital admission, Bruising, Other complaints   Comments:   Ms. Deutsch tested herself using home monitor in clinic- technique was observed and patient was given feedback. POC test in clinic yielded 3.8. She reports she has eaten less GLV this past week. Her daughter reports she has been cooking for Ms. Deutsch this past week and they have had one serving of salad this week instead of her typical 3-4 servings. Her daughter plans to incorporate GLV into diet.  Discussed Vit K content of foods. Additionally- she is going to start lasix tomorrow.     Plan:  1. INR is SUPRAtherapeutic today at 3.9 using home monitor- daughter is now cooking for Ms. Deutsch and believe that GLV intake is not the same as it was when Mrs. Deutsch was cooking for herself. Instructed Mrs. Deutsch's daughter, Mikayla, to have Mrs. Deutsch eat a serving of GLV and  hold her dose of warfarin today and tomorrow and repeat INR on Friday.   2. Repeat INR on Friday 6/22 to ensure back WNL.  3. Ms. Deutsch tested herself using home monitor in clinic- technique was observed and patient was given feedback. POC test in clinic yielded 3.8.   4. Verbal and written information provided in clinic to daughter and Mikayla Sneed. She RBV dosing instructions, expresses understanding with teach back, and has no further questions at this time.    Rhonda Singh, PharmD  6/20/2018  4:39 PM

## 2018-06-22 ENCOUNTER — ANTICOAGULATION VISIT (OUTPATIENT)
Dept: PHARMACY | Facility: HOSPITAL | Age: 83
End: 2018-06-22

## 2018-06-22 LAB — INR PPP: 2.4

## 2018-06-22 NOTE — PROGRESS NOTES
Anticoagulation Clinic - Remote Progress Note  ALERE HOME MONITOR  Frequency of Monitorin days    Indication: Afib  Referring Provider: Luiza  Initial Warfarin Start Date:   Goal INR: 2-3  Current Drug Interactions: Omeprazole, MVI, CoQ10, Levothyroxine  CHADS-VASc: 4 [HTN, Age>75, Female]  Clinic: 2018    Diet: Green beans, glenroy slaw each week (3-4x week)  Alcohol: None  Tobacco: None  OTC Pain Medication: Tylenol    INR History:  Date 12/11 12/20 1/3/18 1/18 1/31 2/14 2/22 2/28 3/7 3/23 4/2 4/11 4/25   Total Weekly Dose 27.5 mg 27.5 mg 27.5  mg 27.5 mg   27.5mg 27.5mg 25mg 27.5mg 27.5  mg 27.5 mg 27.5mg 27.5 mg 27.5mg   INR 2.7 3.0 3.0 2.7 2.5 3.5 2.2 2.9 2.4 3.1 2.2 2.9 2.8   Notes       Held 1x    1 lowered dose       Date 5/10 5/17 5/23 6/6 6/13 6/20 6/22        Total Weekly Dose 25mg 27.5mg 27.5mg 26.25 mg 27.5mg 25mg 20mg 20mg       INR 1.9 2.3 2.4 3.1 3.8; 4.0 3.9 2.4        Notes missed 1x dose, self adj   Less GLV clinic; Less GLV            Phone Interview:  Tablet Strength: 5mg tablet  Current Maintenance Dose: 5mg daily except 2.5mg MonWedFri  Patient Contact Info: Mikayla Johnson (daughter) # [phone]    Patient Findings:  Negatives:   Signs/symptoms of thrombosis, Signs/symptoms of bleeding, Laboratory test error suspected, Change in health, Change in alcohol use, Change in activity, Upcoming invasive procedure, Emergency department visit, Upcoming dental procedure, Missed doses, Extra doses, Change in medications, Change in diet/appetite, Hospital admission, Bruising, Other complaints   Comments:   Spoke with patient's daughter, Mikayla, who states no changes have occurred since last encounter     Plan:  1. INR is back WNL today at 2.4 after several supratherapeutic readings. After consulting Leena PharmD, instructed Mikayla, patient's daughter, to give Ms. Long warfarin 5mg daily except 2.5mg tonight (Friday) and Monday.  2. Repeat INR on .  3. Verbal information was  provided over the phone. Mikayla RBV dosing instructions, expresses understanding by teach back, and has no further questions at this time.    Eleanor Patino, Pharmacy Technician  6/22/2018  1:01 PM     Target 20mg / 7 days again by recheck.   ILeena, [facility], have reviewed the note in full and agree with the assessment and plan.  06/22/18  6:00 PM

## 2018-06-27 ENCOUNTER — ANTICOAGULATION VISIT (OUTPATIENT)
Dept: PHARMACY | Facility: HOSPITAL | Age: 83
End: 2018-06-27

## 2018-06-27 LAB — INR PPP: 3.2

## 2018-06-27 NOTE — PROGRESS NOTES
Anticoagulation Clinic - Remote Progress Note  ALERE HOME MONITOR  Frequency of Monitorin days    Indication: Afib  Referring Provider: Luiza  Initial Warfarin Start Date:   Goal INR: 2-3  Current Drug Interactions: Omeprazole, MVI, CoQ10, Levothyroxine  CHADS-VASc: 4 [HTN, Age>75, Female]  Clinic: 2018    Diet: Green beans, glenroy slaw each week (3-4x week)  Alcohol: None  Tobacco: None  OTC Pain Medication: Tylenol    INR History:  Date 12/11 12/20 1/3/18 1/18 1/31 2/14 2/22 2/28 3/7 3/23 4/2 4/11 4/25   Total Weekly Dose 27.5 mg 27.5 mg 27.5  mg 27.5 mg   27.5mg 27.5 mg 25mg 27.5mg 27.5  mg 27.5 mg 27.5mg 27.5 mg 27.5mg   INR 2.7 3.0 3.0 2.7 2.5 3.5 2.2 2.9 2.4 3.1 2.2 2.9 2.8   Notes       Held 1x    1 lowered dose       Date 5/10 5/17 5/23 6/6 6/13 6/20 6/22 6/27       Total Weekly Dose 25mg 27.5mg 27.5 mg 26.25 mg 27.5mg 25mg 20mg 20mg       INR 1.9 2.3 2.4 3.1 3.8; 4.0 3.9 2.4 3.2       Notes missed 1x dose, self adj   Less GLV; daughter [place] [facility]; Less GLV            Phone Interview:  Tablet Strength: 5mg tablet  Current Maintenance Dose: 5mg daily except 2.5mg MonWedFri  Patient Contact Info: Mikayla Cell (daughter) # [phone]    Patient Findings:  Negatives:   Signs/symptoms of thrombosis, Signs/symptoms of bleeding, Laboratory test error suspected, Change in health, Change in alcohol use, Change in activity, Upcoming invasive procedure, Emergency department visit, Upcoming dental procedure, Missed doses, Extra doses, Change in medications, Change in diet/appetite, Hospital admission, Bruising, Other complaints   Comments:   Spoke with patient's daughter, Mikayla, who states no changes have occurred since last encounter     Plan:  1. INR is slightly SUPRAtherapeutic again. Instructed Mikayla, patient's daughter, to eat a GLV tonight (salad and green beans tomorrow) and decrease Ms. Deutsch's dose to warfarin 2.5mg daily.  2. Repeat INR on .  3. Verbal information was provided over  the phone. Mikayla RBV dosing instructions, expresses understanding by teach back, and has no further questions at this time.    Rhonda Singh, PharmD  6/27/2018  2:46 PM

## 2018-07-01 LAB — INR PPP: 2.1

## 2018-07-02 ENCOUNTER — ANTICOAGULATION VISIT (OUTPATIENT)
Dept: PHARMACY | Facility: HOSPITAL | Age: 83
End: 2018-07-02

## 2018-07-02 LAB — INR PPP: 2.1

## 2018-07-02 NOTE — PROGRESS NOTES
Anticoagulation Clinic - Remote Progress Note  ALERE HOME MONITOR  Frequency of Monitorin days    Indication: Afib  Referring Provider: Luiza  Initial Warfarin Start Date:   Goal INR: 2-3  Current Drug Interactions: Omeprazole, MVI, CoQ10, Levothyroxine  CHADS-VASc: 4 [HTN, Age>75, Female]  Clinic: 2018    Diet: Green beans, glenroy slaw each week (3-4x week)  Alcohol: None  Tobacco: None  OTC Pain Medication: Tylenol    INR History:  Date 12/11 12/20 1/3/18 1/18 1/31 2/14 2/22 2/28 3/7 3/23 4/2 4/11 4/25   Total Weekly Dose 27.5 mg 27.5 mg 27.5  mg 27.5 mg   27.5mg 27.5 mg 25mg 27.5mg 27.5  mg 27.5 mg 27.5mg 27.5 mg 27.5mg   INR 2.7 3.0 3.0 2.7 2.5 3.5 2.2 2.9 2.4 3.1 2.2 2.9 2.8   Notes       Held 1x    1 lowered dose       Date 5/10 5/17 5/23 6/6 6/13 6/20 6/22 6/27 7/1      Total Weekly Dose 25mg 27.5mg 27.5 mg 26.25 mg 27.5mg 25mg 20mg 20mg 20mg      INR 1.9 2.3 2.4 3.1 3.8; 4.0 3.9 2.4 3.2 2.1      Notes missed 1x dose, self adj   Less GLV; daughter [facility]; Less GLV    More GLV; dose decr.        Phone Interview:  Tablet Strength: 5mg tablet  Current Maintenance Dose: 5mg daily except 2.5mg MonWedFri  Patient Contact Info: Mikayla Johnson (daughter) # [phone]    Patient Findings:  Negatives:   Signs/symptoms of thrombosis, Signs/symptoms of bleeding, Laboratory test error suspected, Change in health, Change in alcohol use, Change in activity, Upcoming invasive procedure, Emergency department visit, Upcoming dental procedure, Missed doses, Extra doses, Change in medications, Change in diet/appetite, Hospital admission, Bruising, Other complaints   Comments:   Patient's daughter gave the extra GLV serving as instructed, informed family member to go back and resume normal GLV intake this week     Plan:  1. INR was back WNL on  at 2.1. Instructed Mikayla, patient's daughter, to continue warfarin 2.5mg daily.  2. Repeat INR on .  3. Verbal information was provided over the phone.  Mikayla RBV dosing instructions, expresses understanding by teach back, and has no further questions at this time.    Eleanor Patino, Pharmacy Technician  7/2/2018  8:38 AM     Repeat INR on Thursday 7/5. Spoke with Mikayla who agreed to repeating INR sooner.  I, Rhonda Singh, PharmD, have reviewed the note in full and agree with the assessment and plan.  07/02/18  1:01 PM

## 2018-07-05 ENCOUNTER — ANTICOAGULATION VISIT (OUTPATIENT)
Dept: PHARMACY | Facility: HOSPITAL | Age: 83
End: 2018-07-05

## 2018-07-05 DIAGNOSIS — I48.20 CHRONIC ATRIAL FIBRILLATION (HCC): ICD-10-CM

## 2018-07-05 LAB — INR PPP: 2.6

## 2018-07-05 NOTE — PROGRESS NOTES
Anticoagulation Clinic - Remote Progress Note  ALERE HOME MONITOR  Frequency of Monitorin days    Indication: Afib  Referring Provider: Luiza  Initial Warfarin Start Date:   Goal INR: 2.0-3.0  Current Drug Interactions: Omeprazole, MVI, CoQ10, Levothyroxine  CHADS-VASc: 4 [HTN, Age>75, Female]  Clinic: 2018    Diet: Green beans, glenroy slaw once week, iceberg salad ~2x a week (18)  Alcohol: None  Tobacco: None  OTC Pain Medication: Tylenol    INR History:  Date 12/11 12/20 1/3/18 1/18 1/31 2/14 2/22 2/28 3/7 3/23 4/2 4/11 4/25   Total Weekly Dose 27.5 mg 27.5 mg 27.5  mg 27.5 mg   27.5mg 27.5 mg 25mg 27.5mg 27.5  mg 27.5 mg 27.5mg 27.5 mg 27.5mg   INR 2.7 3.0 3.0 2.7 2.5 3.5 2.2 2.9 2.4 3.1 2.2 2.9 2.8   Notes       Held 1x    1 lowered dose       Date 5/10 5/17 5/23 6/6 6/13 6/20 6/22 6/27 7/1 7/5   Total Weekly Dose 25mg 27.5mg 27.5 mg 26.25 mg 27.5mg 25mg 20mg 20mg 20mg 17.5 mg   INR 1.9 2.3 2.4 3.1 3.8; 4.0 3.9 2.4 3.2 2.1 2.6   Notes missed 1x dose, self adj   Less GLV; daughter [place] [facility]; Less GLV    More GLV; dose decr.      Date             Total Weekly Dose             INR             Notes               Phone Interview:  Tablet Strength: 5mg tablet  Current Maintenance Dose: 5mg daily except 2.5mg MonWedFri  Patient Contact Info: Mikayla Johnson (daughter) # [phone]    Patient Findings   Positives:   Change in diet/appetite   Negatives:   Signs/symptoms of thrombosis, Signs/symptoms of bleeding, Laboratory test error suspected, Change in health, Change in alcohol use, Change in activity, Upcoming invasive procedure, Emergency department visit, Upcoming dental procedure, Missed doses, Extra doses, Change in medications, Hospital admission, Bruising, Other complaints   Comments:   Dtr says patient currently doesn't have the best appetite     Plan:  1. INR is therapeutic today at 2.6. Instructed patients daughter, Mikayla, to continue warfarin 2.5mg daily.  2. Repeat INR in ~1 week, Wed,  7/11  3. Verbal information was provided over the phone. Mikayla RBV dosing instructions, expresses understanding by teach back, and has no further questions at this time.    Loco Valle, [place]  7/5/2018  11:54 AM    I, Rhonda Singh, PharmD, have reviewed the note in full and agree with the assessment and plan.  07/05/18  12:49 PM

## 2018-07-11 ENCOUNTER — ANTICOAGULATION VISIT (OUTPATIENT)
Dept: PHARMACY | Facility: HOSPITAL | Age: 83
End: 2018-07-11

## 2018-07-11 DIAGNOSIS — I48.20 CHRONIC ATRIAL FIBRILLATION (HCC): ICD-10-CM

## 2018-07-11 LAB — INR PPP: 1.9

## 2018-07-11 NOTE — PROGRESS NOTES
Anticoagulation Clinic - Remote Progress Note  ALERE HOME MONITOR  Frequency of Monitorin days    Indication: Afib  Referring Provider: Luiza  Initial Warfarin Start Date:   Goal INR: 2.0-3.0  Current Drug Interactions: Omeprazole, MVI, CoQ10, Levothyroxine  CHADS-VASc: 4 [HTN, Age>75, Female]  Clinic: 2018    Diet: Green beans, glenroy slaw once week, iceberg salad ~2x a week (18)  Alcohol: None  Tobacco: None  OTC Pain Medication: Tylenol    INR History:  Date 12/11 12/20 1/3/18 1/18 1/31 2/14 2/22 2/28 3/7 3/23 4/2 4/11 4/25   Total Weekly Dose 27.5 mg 27.5 mg 27.5  mg 27.5 mg   27.5mg 27.5 mg 25mg 27.5mg 27.5  mg 27.5 mg 27.5mg 27.5 mg 27.5mg   INR 2.7 3.0 3.0 2.7 2.5 3.5 2.2 2.9 2.4 3.1 2.2 2.9 2.8   Notes       Held 1x    1 lowered dose       Date 5/10 5/17 5/23 6/6 6/13 6/20 6/22 6/27 7/1 7/5   Total Weekly Dose 25mg 27.5mg 27.5 mg 26.25 mg 27.5mg 25mg 20mg 20mg 20mg 17.5 mg   INR 1.9 2.3 2.4 3.1 3.8; 4.0 3.9 2.4 3.2 2.1 2.6   Notes missed 1x dose, self adj   Less GLV; daughter [place] [facility]; Less GLV    More GLV; dose decr.      Date             Total Weekly Dose 17.5mg            INR 1.9            Notes               Phone Interview:  Tablet Strength: 5mg tablet  Current Maintenance Dose: 5mg daily except 2.5mg MonWedFri  Patient Contact Info: Mikayla Johnson (daughter) # [phone]    Patient Findings     Negatives:   Signs/symptoms of thrombosis, Signs/symptoms of bleeding, Laboratory test error suspected, Change in health, Change in alcohol use, Change in activity, Upcoming invasive procedure, Emergency department visit, Upcoming dental procedure, Missed doses, Extra doses, Change in medications, Change in diet/appetite, Hospital admission, Bruising, Other complaints   Comments:   Patient's appetite is low recently. Patient's daughter denies any other changes.       Plan:  1. INR is subtherapeutic today at 1.9. Instructed patients daughter, Mikayla, to BOOST dose tonight to 3.75mg and  continue warfarin 2.5mg daily.  2. Repeat INR in 1 week, Wed, 7/18.  3. Verbal information was provided over the phone. Mikayla RBV dosing instructions, expresses understanding by teach back, and has no further questions at this time.    Mina Petty  7/11/2018  1:38 PM    I, Mychal Huang [facility], have reviewed the note in full and agree with the assessment and plan.  07/12/18  8:42 AM

## 2018-07-18 ENCOUNTER — ANTICOAGULATION VISIT (OUTPATIENT)
Dept: PHARMACY | Facility: HOSPITAL | Age: 83
End: 2018-07-18

## 2018-07-18 DIAGNOSIS — I48.20 CHRONIC ATRIAL FIBRILLATION (HCC): ICD-10-CM

## 2018-07-18 LAB — INR PPP: 1.7

## 2018-07-18 NOTE — PROGRESS NOTES
Anticoagulation Clinic - Remote Progress Note  ALERE HOME MONITOR  Frequency of Monitorin days    Indication: Afib  Referring Provider: Luiza  Initial Warfarin Start Date:   Goal INR: 2.0-3.0  Current Drug Interactions: Omeprazole, MVI, CoQ10, Levothyroxine  CHADS-VASc: 4 [HTN, Age>75, Female]  Clinic: 2018    Diet: Green beans, glenroy slaw once week, iceberg salad ~2x a week (18)  Alcohol: None  Tobacco: None  OTC Pain Medication: Tylenol    INR History:  Date 12/11 12/20 1/3/18 1/18 1/31 2/14 2/22 2/28 3/7 3/23 4/2 4/11 4/25   Total Weekly Dose 27.5 mg 27.5 mg 27.5  mg 27.5 mg   27.5mg 27.5 mg 25mg 27.5mg 27.5  mg 27.5 mg 27.5mg 27.5 mg 27.5mg   INR 2.7 3.0 3.0 2.7 2.5 3.5 2.2 2.9 2.4 3.1 2.2 2.9 2.8   Notes       Held 1x    1 lowered dose       Date 5/10 5/17 5/23 6/6 6/13 6/20 6/22 6/27 7/1 7/5   Total Weekly Dose 25mg 27.5mg 27.5 mg 26.25 mg 27.5mg 25mg 20mg 20mg 20mg 17.5 mg   INR 1.9 2.3 2.4 3.1 3.8; 4.0 3.9 2.4 3.2 2.1 2.6   Notes missed 1x dose, self adj   Less GLV; daughter [place] [facility]; Less GLV    More GLV; dose decr.      Date            Total Weekly Dose 17.5mg  18.75mg           INR 1.9 1.7           Notes               Phone Interview:  Tablet Strength: 5mg tablet  Current Maintenance Dose: 5mg daily except 2.5mg MonWedFri  Patient Contact Info: Mikayla Johnson (daughter) # [phone]    Patient Findings     Negatives:   Signs/symptoms of thrombosis, Signs/symptoms of bleeding, Laboratory test error suspected, Change in health, Change in alcohol use, Change in activity, Upcoming invasive procedure, Emergency department visit, Upcoming dental procedure, Missed doses, Extra doses, Change in medications, Change in diet/appetite, Hospital admission, Bruising, Other complaints   Comments:   Patient's appetite is low recently. Patient's daughter denies any other changes.       Plan:  1. INR is subtherapeutic again today at 1.7. Instructed patients daughter, Mikayla, to BOOST dose  tonight to 3.75mg and begin warfarin 2.5mg daily except 3.75 mg on WedSat (7.2% increase from last week)  2. Repeat INR in 1 week, Wed, 7/25.  3. Verbal information was provided over the phone. Mikayla RBV dosing instructions, expresses understanding by teach back, and has no further questions at this time.    Reena Moore, PharmD  7/18/2018  2:14 PM

## 2018-07-26 ENCOUNTER — ANTICOAGULATION VISIT (OUTPATIENT)
Dept: PHARMACY | Facility: HOSPITAL | Age: 83
End: 2018-07-26

## 2018-07-26 DIAGNOSIS — I48.20 CHRONIC ATRIAL FIBRILLATION (HCC): ICD-10-CM

## 2018-07-26 LAB — INR PPP: 2

## 2018-07-26 NOTE — PROGRESS NOTES
Anticoagulation Clinic - Remote Progress Note  ALERE HOME MONITOR  Frequency of Monitorin days    Indication: Afib  Referring Provider: Luiza  Initial Warfarin Start Date:   Goal INR: 2.0-3.0  Current Drug Interactions: Omeprazole, MVI, CoQ10, Levothyroxine  CHADS-VASc: 4 [HTN, Age>75, Female]  Clinic: 2018    Diet: Green beans, glenroy slaw once week, iceberg salad ~2x a week (18)  Alcohol: None  Tobacco: None  OTC Pain Medication: Tylenol    INR History:  Date 12/11 12/20 1/3/18 1/18 1/31 2/14 2/22 2/28 3/7 3/23 4/2 4/11 4/25   Total Weekly Dose 27.5 mg 27.5 mg 27.5  mg 27.5 mg   27.5mg 27.5 mg 25mg 27.5mg 27.5  mg 27.5 mg 27.5mg 27.5 mg 27.5mg   INR 2.7 3.0 3.0 2.7 2.5 3.5 2.2 2.9 2.4 3.1 2.2 2.9 2.8   Notes       Held 1x    1 lowered dose       Date 5/10 5/17 5/23 6/6 6/13 6/20 6/22 6/27 7/1 7/5   Total Weekly Dose 25mg 27.5mg 27.5 mg 26.25 mg 27.5mg 25mg 20mg 20mg 20mg 17.5 mg   INR 1.9 2.3 2.4 3.1 3.8; 4.0 3.9 2.4 3.2 2.1 2.6   Notes missed 1x dose, self adj   Less GLV; daughter [place] [facility]; Less GLV    More GLV; dose decr.      Date           Total Weekly Dose 17.5mg  18.75mg 18.75mg          INR 1.9 1.7 2.0          Notes               Phone Interview:  Tablet Strength: 5mg tablet  Current Maintenance Dose: 5mg daily except 2.5mg MonWedFri  Patient Contact Info: Mikayla Johnson (daughter) # [phone]    Patient Findings   Negatives:   Signs/symptoms of thrombosis, Signs/symptoms of bleeding, Laboratory test error suspected, Change in health, Change in alcohol use, Change in activity, Upcoming invasive procedure, Emergency department visit, Upcoming dental procedure, Missed doses, Extra doses, Change in medications, Change in diet/appetite, Hospital admission, Bruising, Other complaints   Comments:   Mrs. Parada reports that her appetite is not what it used to be but it is consistently low at this point. Mrs. Deutsch's daughter reports that Mrs. Deutsch only took 2.5mg yesterday instead  of 3.75mg.     Plan:  1. INR was therapeutic yesterday. Mrs. Deutsch's daughter reports that Mrs. Deutsch only took 2.5mg yesterday instead of 3.75mg, therefore, will change weekly dosing to 2.5mg daily except 3.75mg ThurSun.   2. Repeat INR in 1 week, Wed, 8/1.  3. Verbal information was provided over the phone. Mikayla RBV dosing instructions, expresses understanding by teach back, and has no further questions at this time.    Rhonda Singh, PharmD  7/26/2018  9:42 AM

## 2018-08-01 ENCOUNTER — ANTICOAGULATION VISIT (OUTPATIENT)
Dept: PHARMACY | Facility: HOSPITAL | Age: 83
End: 2018-08-01

## 2018-08-01 DIAGNOSIS — I48.20 CHRONIC ATRIAL FIBRILLATION (HCC): ICD-10-CM

## 2018-08-01 LAB — INR PPP: 1.5

## 2018-08-01 NOTE — PROGRESS NOTES
Anticoagulation Clinic - Remote Progress Note  ALERE HOME MONITOR  Frequency of Monitorin days    Indication: Afib  Referring Provider: Luiza  Initial Warfarin Start Date:   Goal INR: 2.0-3.0  Current Drug Interactions: Omeprazole, MVI, CoQ10, Levothyroxine  CHADS-VASc: 4 [HTN, Age>75, Female]  Clinic: 2018    Diet: Green beans, glenroy slaw once week, iceberg salad ~2x a week (18)  Alcohol: None  Tobacco: None  OTC Pain Medication: Tylenol    INR History:  Date 12/11 12/20 1/3/18 1/18 1/31 2/14 2/22 2/28 3/7 3/23 4/2 4/11 4/25   Total Weekly Dose 27.5 mg 27.5 mg 27.5  mg 27.5 mg   27.5mg 27.5 mg 25mg 27.5mg 27.5  mg 27.5 mg 27.5mg 27.5 mg 27.5mg   INR 2.7 3.0 3.0 2.7 2.5 3.5 2.2 2.9 2.4 3.1 2.2 2.9 2.8   Notes       Held 1x    1 lowered dose       Date 5/10 5/17 5/23 6/6 6/13 6/20 6/22 6/27 7/1 7/5   Total Weekly Dose 25mg 27.5mg 27.5 mg 26.25 mg 27.5mg 25mg 20mg 20mg 20mg 17.5 mg   INR 1.9 2.3 2.4 3.1 3.8; 4.0 3.9 2.4 3.2 2.1 2.6   Notes missed 1x dose, self adj   Less GLV; daughter [place] [facility]; Less GLV    More GLV; dose decr.      Date          Total Weekly Dose 17.5mg  18.75mg 18.75mg 20mg         INR 1.9 1.7 2.0 1.5         Notes               Phone Interview:  Tablet Strength: 5mg tablet  Current Maintenance Dose: 5mg daily except 2.5mg MonWedFri  Patient Contact Info: Mikayla Johnson (daughter) # [phone]    Patient Findings:    Negatives:   Signs/symptoms of thrombosis, Signs/symptoms of bleeding, Laboratory test error suspected, Change in health, Change in alcohol use, Change in activity, Upcoming invasive procedure, Emergency department visit, Upcoming dental procedure, Missed doses, Extra doses, Change in medications, Change in diet/appetite, Hospital admission, Bruising, Other complaints     Plan:  1. INR is subtherapeutic today at 1.5. Discussed with Mrs. Deutsch's daughter, instructed her to boost Mrs. Deutsch's warfarin dose to 3.75 mg tonight, 5 mg tomorrow night, then  continue warfarin 2.5mg daily except 3.75mg ThurSun.   2. Repeat INR in 1 week, Wed, 8/8.  3. Verbal information was provided over the phone. Mikayla RBV dosing instructions, expresses understanding by teach back, and has no further questions at this time.    Meredith Riddle, PharmD  8/1/2018  5:20 PM

## 2018-08-07 ENCOUNTER — ANTICOAGULATION VISIT (OUTPATIENT)
Dept: PHARMACY | Facility: HOSPITAL | Age: 83
End: 2018-08-07

## 2018-08-07 DIAGNOSIS — I48.20 CHRONIC ATRIAL FIBRILLATION (HCC): ICD-10-CM

## 2018-08-07 LAB — INR PPP: 1.8

## 2018-08-07 NOTE — PROGRESS NOTES
Anticoagulation Clinic - Remote Progress Note  ALERE HOME MONITOR  Frequency of Monitorin days    Indication: Afib  Referring Provider: Luiza  Initial Warfarin Start Date:   Goal INR: 2.0-3.0  Current Drug Interactions: Omeprazole, MVI, CoQ10, Levothyroxine  CHADS-VASc: 4 [HTN, Age>75, Female]  Clinic: 2018    Diet: Green beans, glenroy slaw once week, iceberg salad ~2x a week (18)  Alcohol: None  Tobacco: None  OTC Pain Medication: Tylenol    INR History:  Date 12/11 12/20 1/3/18 1/18 1/31 2/14 2/22 2/28 3/7 3/23 4/2 4/11 4/25   Total Weekly Dose 27.5 mg 27.5 mg 27.5  mg 27.5 mg   27.5mg 27.5 mg 25mg 27.5mg 27.5  mg 27.5 mg 27.5mg 27.5 mg 27.5mg   INR 2.7 3.0 3.0 2.7 2.5 3.5 2.2 2.9 2.4 3.1 2.2 2.9 2.8   Notes       Held 1x    1 lowered dose       Date 5/10 5/17 5/23 6/6 6/13 6/20 6/22 6/27 7/1 7/5   Total Weekly Dose 25mg 27.5mg 27.5 mg 26.25 mg 27.5mg 25mg 20mg 20mg 20mg 17.5 mg   INR 1.9 2.3 2.4 3.1 3.8; 4.0 3.9 2.4 3.2 2.1 2.6   Notes missed 1x dose, self adj   Less GLV; daughter [place] [facility]; Less GLV    More GLV; dose decr.      Date  87        Total Weekly Dose 17.5mg  18.75mg 18.75mg 20mg 22.5 mg        INR 1.9 1.7 2.0 1.5 1.8        Notes     boost          Phone Interview:  Tablet Strength: 5mg tablet  Current Maintenance Dose: 5mg daily except 2.5mg MonWedFri  Patient Contact Info: Mikayla Johnson (daughter) # [phone]    Patient Findings:    Negatives:   Signs/symptoms of thrombosis, Signs/symptoms of bleeding, Laboratory test error suspected, Change in health, Change in alcohol use, Change in activity, Upcoming invasive procedure, Emergency department visit, Upcoming dental procedure, Missed doses, Extra doses, Change in medications, Change in diet/appetite, Hospital admission, Bruising, Other complaints     Plan:  1. INR is subtherapeutic today at 1.8. Discussed with Mrs. Deutsch's daughter, instructed her to boost Mrs. Deutsch's warfarin dose to 5 mg tonight, then continue  warfarin 2.5mg daily except 3.75mg ThurSun. Initially only suggested to boost Tuesday's dose to 3.75 mg but her Mikayla didn't think that would be enough, so we then went with 5 mg on Tuesday.  2. Repeat INR in 1 week, Wed, 8/14.  3. Verbal information was provided over the phone. Mikayla RBV dosing instructions, expresses understanding by teach back, and has no further questions at this time.    Loco Freedman, PharmD  8/7/2018  12:15 PM

## 2018-08-15 ENCOUNTER — ANTICOAGULATION VISIT (OUTPATIENT)
Dept: PHARMACY | Facility: HOSPITAL | Age: 83
End: 2018-08-15

## 2018-08-15 DIAGNOSIS — I48.20 CHRONIC ATRIAL FIBRILLATION (HCC): ICD-10-CM

## 2018-08-15 LAB — INR PPP: 2.1

## 2018-08-15 NOTE — PROGRESS NOTES
Anticoagulation Clinic - Remote Progress Note  ALERE HOME MONITOR  Frequency of Monitorin days    Indication: Afib  Referring Provider: Luiza  Initial Warfarin Start Date:   Goal INR: 2.0-3.0  Current Drug Interactions: Omeprazole, MVI, CoQ10, Levothyroxine  CHADS-VASc: 4 [HTN, Age>75, Female]  Clinic: 2018    Diet: Green beans, glenroy slaw once week, iceberg salad ~2x a week (18)  Alcohol: None  Tobacco: None  OTC Pain Medication: Tylenol    INR History:  Date 12/11 12/20 1/3/18 1/18 1/31 2/14 2/22 2/28 3/7 3/23 4/2 4/11 4/25   Total Weekly Dose 27.5 mg 27.5 mg 27.5  mg 27.5 mg   27.5mg 27.5 mg 25mg 27.5mg 27.5  mg 27.5 mg 27.5mg 27.5 mg 27.5mg   INR 2.7 3.0 3.0 2.7 2.5 3.5 2.2 2.9 2.4 3.1 2.2 2.9 2.8   Notes       Held 1x    1 lowered dose       Date 5/10 5/17 5/23 6/6 6/13 6/20 6/22 6/27 7/1 7/5   Total Weekly Dose 25mg 27.5mg 27.5 mg 26.25 mg 27.5mg 25mg 20mg 20mg 20mg 17.5 mg   INR 1.9 2.3 2.4 3.1 3.8; 4.0 3.9 2.4 3.2 2.1 2.6   Notes missed 1x dose, self adj   Less GLV; daughter [place] [facility]; Less GLV    More GLV; dose decr.      Date 7/11  7/18 7/25 8/1 8/7 8/15       Total Weekly Dose 17.5mg  18.75mg 18.75mg 20mg 22.5 mg 20mg       INR 1.9 1.7 2.0 1.5 1.8  2.1       Notes     boost boost         Phone Interview:  Tablet Strength: 5mg tablet  Current Maintenance Dose: 5mg daily except 2.5mg MonWedFri  Patient Contact Info: Mikayla Johnson (daughter) # [phone]        Plan:  1. INR is back WNL today at 2.1. Discussed with Mrs. Deutsch's daughter, instructed her to resume maintenance dose of 2.5mg daily except 3.75mg on SunThurs.  2. Repeat INR in one week, . If she remains in goal range on this dose then repeat in 2 weeks seems reasonable.  3. Verbal information was provided over the phone. Mikayla RBV dosing instructions, expresses understanding by teach back, and has no further questions at this time.    Loco Freedman rph  8/15/2018  3:50 PM

## 2018-08-22 ENCOUNTER — ANTICOAGULATION VISIT (OUTPATIENT)
Dept: PHARMACY | Facility: HOSPITAL | Age: 83
End: 2018-08-22

## 2018-08-22 DIAGNOSIS — I48.20 CHRONIC ATRIAL FIBRILLATION (HCC): ICD-10-CM

## 2018-08-22 LAB — INR PPP: 2.1

## 2018-08-22 NOTE — PROGRESS NOTES
Anticoagulation Clinic - Remote Progress Note  ALERE HOME MONITOR  Frequency of Monitorin days    Indication: Afib  Referring Provider: Luiza  Initial Warfarin Start Date:   Goal INR: 2.0-3.0  Current Drug Interactions: Omeprazole, MVI, CoQ10, Levothyroxine  CHADS-VASc: 4 [HTN, Age>75, Female]  Clinic: 2018    Diet: Green beans, glenroy slaw once week, iceberg salad ~2x a week (18)  Alcohol: None  Tobacco: None  OTC Pain Medication: Tylenol    INR History:  Date 12/11 12/20 1/3/18 1/18 1/31 2/14 2/22 2/28 3/7 3/23 4/2 4/11 4/25   Total Weekly Dose 27.5 mg 27.5 mg 27.5  mg 27.5 mg   27.5mg 27.5 mg 25mg 27.5mg 27.5  mg 27.5 mg 27.5mg 27.5 mg 27.5mg   INR 2.7 3.0 3.0 2.7 2.5 3.5 2.2 2.9 2.4 3.1 2.2 2.9 2.8   Notes       Held 1x    1 lowered dose       Date 5/10 5/17 5/23 6/6 6/13 6/20 6/22 6/27 7/1 7/5   Total Weekly Dose 25mg 27.5mg 27.5 mg 26.25 mg 27.5mg 25mg 20mg 20mg 20mg 17.5 mg   INR 1.9 2.3 2.4 3.1 3.8; 4.0 3.9 2.4 3.2 2.1 2.6   Notes missed 1x dose, self adj   Less GLV; daughter [place] [facility]; Less GLV    More GLV; dose decr.      Date 7/11  7/18 7/25 8/1 8/7 8/15 8/22      Total Weekly Dose 17.5mg  18.75mg 18.75mg 20mg 22.5 mg 20mg 20mg      INR 1.9 1.7 2.0 1.5 1.8  2.1 2.1      Notes     boost boost         Phone Interview:  Tablet Strength: 5mg tablet  Current Maintenance Dose: 5mg daily except 2.5mg MonWedFri  Patient Contact Info: Mikayla Johnson (daughter) # [phone]    Patient Findings:  Negatives:   Signs/symptoms of thrombosis, Signs/symptoms of bleeding, Laboratory test error suspected, Change in health, Change in alcohol use, Change in activity, Upcoming invasive procedure, Emergency department visit, Upcoming dental procedure, Missed doses, Extra doses, Change in medications, Change in diet/appetite, Hospital admission, Bruising, Other complaints   Comments:   Patient's daughter, Mikayla, states no changes since last encounter     Plan:  1. INR is therapeutic today at 2.1. Discussed  with Mrs. Deutsch's daughter, instructed her to continue maintenance dose of 2.5mg daily except 3.75mg on SunThurs.  2. Repeat INR in two weeks, 9/5.  3. Verbal information was provided over the phone. Mikayla RBV dosing instructions, expresses understanding by teach back, and has no further questions at this time.    Eleanor Patino, [place]  8/22/2018  12:01 PM     If patient returns subtherapeutic consider dose increase at that time.  I, Rhonda Singh, PharmD, have reviewed the note in full and agree with the assessment and plan.  08/22/18  2:43 PM

## 2018-09-05 ENCOUNTER — ANTICOAGULATION VISIT (OUTPATIENT)
Dept: PHARMACY | Facility: HOSPITAL | Age: 83
End: 2018-09-05

## 2018-09-05 DIAGNOSIS — I48.20 CHRONIC ATRIAL FIBRILLATION (HCC): ICD-10-CM

## 2018-09-05 LAB — INR PPP: 1.5

## 2018-09-05 NOTE — PROGRESS NOTES
Anticoagulation Clinic - Remote Progress Note  ALERE HOME MONITOR  Frequency of Monitorin days    Indication: Afib  Referring Provider: Luiza  Initial Warfarin Start Date:   Goal INR: 2.0-3.0  Current Drug Interactions: Omeprazole, MVI, CoQ10, Levothyroxine  CHADS-VASc: 4 [HTN, Age>75, Female]  Clinic: 2018    Diet: Green beans, glenroy slaw once week, iceberg salad ~2x a week (18)  Alcohol: None  Tobacco: None  OTC Pain Medication: Tylenol    INR History:  Date 12/11 12/20 1/3/18 1/18 1/31 2/14 2/22 2/28 3/7 3/23 4/2 4/11 4/25   Total Weekly Dose 27.5 mg 27.5 mg 27.5  mg 27.5 mg   27.5mg 27.5 mg 25mg 27.5mg 27.5  mg 27.5 mg 27.5mg 27.5 mg 27.5mg   INR 2.7 3.0 3.0 2.7 2.5 3.5 2.2 2.9 2.4 3.1 2.2 2.9 2.8   Notes       Held 1x    1 lowered dose       Date 5/10 5/17 5/23 6/6 6/13 6/20 6/22 6/27 7/1 7/5   Total Weekly Dose 25mg 27.5mg 27.5 mg 26.25 mg 27.5mg 25mg 20mg 20mg 20mg 17.5 mg   INR 1.9 2.3 2.4 3.1 3.8; 4.0 3.9 2.4 3.2 2.1 2.6   Notes missed 1x dose, self adj   Less GLV; daughter [place] [facility]; Less GLV    More GLV; dose decr.      Date 7/11  7/18 7/25 8/1 8/7 8/15 8/22 9/5 9/10    Total Weekly Dose 17.5mg  18.75mg 18.75mg 20mg 22.5 mg 20mg 20mg 20 mg 22.5 mg    INR 1.9 1.7 2.0 1.5 1.8  2.1 2.1 1.5     Notes     boost boost   boost      Phone Interview:  Tablet Strength: 5mg tablet  Current Maintenance Dose: 5mg daily except 2.5mg MonWedFri  Patient Contact Info: Mikayla oJhnson (daughter) # [phone]    Patient Findings   Negatives:   Signs/symptoms of thrombosis, Signs/symptoms of bleeding, Laboratory test error suspected, Change in health, Change in alcohol use, Change in activity, Upcoming invasive procedure, Emergency department visit, Upcoming dental procedure, Missed doses, Extra doses, Change in medications, Change in diet/appetite, Hospital admission, Bruising, Other complaints   Comments:   Mikalya, Mrs. Deutsch's daughter, said that her mother was probably more active this week. She denied  any missed doses, extra doses, signs of bleeding, changes in medications, changes in diet, or upcoming procedures.      Plan:  1. INR is SUBtherapeutic today at 1.5. Instructed Mrs. Deutsch's daughter to have her mother BOOST tonight's warfarin to 5 mg then continue warfarin 2.5mg daily except 3.75mg on SunThurs. (12.5% weekly dose increase; depending on INR results Monday, patient may need a new maintenance plan of warfarin 2.5 mg MWF and 3.75 TThSaSun--22.5 mg weekly dose).   2. Repeat INR Monday, 9/10.  3. Verbal information was provided over the phone. Mikayla RBV dosing instructions, expresses understanding by teach back, and has no further questions at this time.    Alexia Garza, Pharmacy Intern  9/5/2018  3:26 PM     IMeredith, [facility], have reviewed the note in full and agree with the assessment and plan.  09/05/18  4:32 PM

## 2018-09-10 ENCOUNTER — ANTICOAGULATION VISIT (OUTPATIENT)
Dept: PHARMACY | Facility: HOSPITAL | Age: 83
End: 2018-09-10

## 2018-09-10 DIAGNOSIS — I48.20 CHRONIC ATRIAL FIBRILLATION (HCC): ICD-10-CM

## 2018-09-10 LAB — INR PPP: 1.7

## 2018-09-10 NOTE — PROGRESS NOTES
Anticoagulation Clinic - Remote Progress Note  ALERE HOME MONITOR  Frequency of Monitorin days    Indication: Afib  Referring Provider: Luiza  Initial Warfarin Start Date:   Goal INR: 2.0-3.0  Current Drug Interactions: Omeprazole, MVI, CoQ10, Levothyroxine  CHADS-VASc: 4 [HTN, Age>75, Female]  Clinic: 2018    Diet: Green beans, glenroy slaw once week, iceberg salad ~2x a week (18)  Alcohol: None  Tobacco: None  OTC Pain Medication: Tylenol    INR History:  Date 12/11 12/20 1/3/18 1/18 1/31 2/14 2/22 2/28 3/7 3/23 4/2 4/11 4/25   Total Weekly Dose 27.5 mg 27.5 mg 27.5  mg 27.5 mg   27.5mg 27.5 mg 25mg 27.5mg 27.5  mg 27.5 mg 27.5mg 27.5 mg 27.5mg   INR 2.7 3.0 3.0 2.7 2.5 3.5 2.2 2.9 2.4 3.1 2.2 2.9 2.8   Notes       Held 1x    1 lowered dose       Date 5/10 5/17 5/23 6/6 6/13 6/20 6/22 6/27 7/1 7/5   Total Weekly Dose 25mg 27.5mg 27.5 mg 26.25 mg 27.5mg 25mg 20mg 20mg 20mg 17.5 mg   INR 1.9 2.3 2.4 3.1 3.8; 4.0 3.9 2.4 3.2 2.1 2.6   Notes missed 1x dose, self adj   Less GLV; daughter [place] [facility]; Less GLV    More GLV; dose decr.      Date 7/11  7/18 7/25 8/1 8/7 8/15 8/22 9/5 9/10    Total Weekly Dose 17.5mg  18.75mg 18.75mg 20mg 22.5 mg 20mg 20mg 20 mg 22.5 mg    INR 1.9 1.7 2.0 1.5 1.8  2.1 2.1 1.5 1.7    Notes     boost boost   boost      Phone Interview:  Tablet Strength: 5mg tablet  Current Maintenance Dose: 5mg daily except 2.5mg MonWedFri  Patient Contact Info: Mikayla Johnson (daughter) # [phone]    Patient Findings   Negatives:   Signs/symptoms of thrombosis, Signs/symptoms of bleeding, Laboratory test error suspected, Change in health, Change in alcohol use, Change in activity, Upcoming invasive procedure, Emergency department visit, Upcoming dental procedure, Missed doses, Extra doses, Change in medications, Change in diet/appetite, Hospital admission, Bruising, Other complaints   Comments:   Mikayla says that her mother is back to baseline as far as activity goes.     Plan:  1. INR is  SUBtherapeutic today at 1.7. Instructed Mrs. Deutsch's daughter to have her mother change warfarin to 3.75mg daily except 2.5 mg on TuThSa (12.5% weekly dose increase.)  We did change her dose around and asked Mikayla to write the dosing instructions down.  2. Repeat INR Monday, 9/17 to evaluate 22.5 mg/week dose.  3. Verbal information was provided over the phone. Mikayla RBV dosing instructions, expresses understanding by teach back, and has no further questions at this time.    Loco Freedman [facility]  9/10/2018  9:39 AM

## 2018-09-17 ENCOUNTER — ANTICOAGULATION VISIT (OUTPATIENT)
Dept: PHARMACY | Facility: HOSPITAL | Age: 83
End: 2018-09-17

## 2018-09-17 DIAGNOSIS — I48.20 CHRONIC ATRIAL FIBRILLATION (HCC): ICD-10-CM

## 2018-09-17 LAB — INR PPP: 1.5

## 2018-09-17 NOTE — PROGRESS NOTES
Anticoagulation Clinic - Remote Progress Note  ALERE HOME MONITOR  Frequency of Monitorin days    Indication: Afib  Referring Provider: Luiza  Initial Warfarin Start Date:   Goal INR: 2.0-3.0  Current Drug Interactions: Omeprazole, MVI, CoQ10, Levothyroxine  CHADS-VASc: 4 [HTN, Age>75, Female]  Clinic: 2018    Diet: Green beans, glenroy slaw once week, iceberg salad ~2x a week (18)  Alcohol: None  Tobacco: None  OTC Pain Medication: Tylenol    INR History:  Date 12/11 12/20 1/3/18 1/18 1/31 2/14 2/22 2/28 3/7 3/23 4/2 4/11 4/25   Total Weekly Dose 27.5 mg 27.5 mg 27.5  mg 27.5 mg   27.5mg 27.5 mg 25mg 27.5mg 27.5  mg 27.5 mg 27.5mg 27.5 mg 27.5mg   INR 2.7 3.0 3.0 2.7 2.5 3.5 2.2 2.9 2.4 3.1 2.2 2.9 2.8   Notes       Held 1x    1 lowered dose       Date 5/10 5/17 5/23 6/6 6/13 6/20 6/22 6/27 7/1 7/5   Total Weekly Dose 25mg 27.5mg 27.5 mg 26.25 mg 27.5mg 25mg 20mg 20mg 20mg 17.5 mg   INR 1.9 2.3 2.4 3.1 3.8; 4.0 3.9 2.4 3.2 2.1 2.6   Notes missed 1x dose, self adj   Less GLV; daughter [place] clinic; Less GLV    More GLV; dose decr.      Date 7/11  7/18 7/25 8/1 8/7 8/15 8/22 9/5 9/10 9/17   Total Weekly Dose 17.5mg  18.75mg 18.75mg 20mg 22.5 mg 20mg 20mg 20 mg 22.5 mg 22.5 mg   INR 1.9 1.7 2.0 1.5 1.8  2.1 2.1 1.5 1.7 1.5   Notes     boost boost   boost      Phone Interview:  Tablet Strength: 5mg tablet  Current Maintenance Dose: 5mg daily except 2.5mg MonWedFri  Patient Contact Info: Mikayla Johnson (daughter) # [phone]    Patient Findings   Negatives:   Signs/symptoms of thrombosis, Signs/symptoms of bleeding, Laboratory test error suspected, Change in health, Change in alcohol use, Change in activity, Upcoming invasive procedure, Emergency department visit, Upcoming dental procedure, Missed doses, Extra doses, Change in medications, Change in diet/appetite, Hospital admission, Bruising, Other complaints   Comments:   Only changed to the dose we recommended       Plan:  1. INR is SUBtherapeutic  today at 1.5, down from 1.7 previous week. Instructed Mrs. Deutsch's daughter to have her mother change the Tuesday dose to 3.75 mg warfarin to 3.75mg daily except 2.5 mg on TuThSa.  We did change her dose around and asked Mikayla to write the dosing instructions down.  2. Repeat INR Monday, 9/24 to continue to evaluate 22.5 mg/week dose.  3. Verbal information was provided over the phone. Mikayla RBV dosing instructions, expresses understanding by teach back, and has no further questions at this time.    Loco Freedman [facility]  9/17/2018  11:25 AM

## 2018-09-24 ENCOUNTER — ANTICOAGULATION VISIT (OUTPATIENT)
Dept: PHARMACY | Facility: HOSPITAL | Age: 83
End: 2018-09-24

## 2018-09-24 DIAGNOSIS — I48.20 CHRONIC ATRIAL FIBRILLATION (HCC): ICD-10-CM

## 2018-09-24 LAB — INR PPP: 2.1

## 2018-09-24 NOTE — PROGRESS NOTES
Anticoagulation Clinic - Remote Progress Note  ALERE HOME MONITOR  Frequency of Monitorin days    Indication: Afib  Referring Provider: Luiza  Initial Warfarin Start Date:   Goal INR: 2.0-3.0  Current Drug Interactions: Omeprazole, MVI, CoQ10, Levothyroxine  CHADS-VASc: 4 [HTN, Age>75, Female]  Clinic: 2018    Diet: Green beans, glenroy slaw once week, iceberg salad ~2x a week (18)  Alcohol: None  Tobacco: None  OTC Pain Medication: Tylenol    INR History:  Date 12/11 12/20 1/3/18 1/18 1/31 2/14 2/22 2/28 3/7 3/23 4/2 4/11 4/25   Total Weekly Dose 27.5 mg 27.5 mg 27.5  mg 27.5 mg   27.5mg 27.5 mg 25mg 27.5mg 27.5  mg 27.5 mg 27.5mg 27.5 mg 27.5mg   INR 2.7 3.0 3.0 2.7 2.5 3.5 2.2 2.9 2.4 3.1 2.2 2.9 2.8   Notes       Held 1x    1 lowered dose       Date 5/10 5/17 5/23 6/6 6/13 6/20 6/22 6/27 7/1 7/5   Total Weekly Dose 25mg 27.5mg 27.5mg 26.25 mg 27.5mg 25mg 20mg 20mg 20mg 17.5mg   INR 1.9 2.3 2.4 3.1 3.8; 4.0 3.9 2.4 3.2 2.1 2.6   Notes missed 1x dose, self adj   Less GLV; daughter [place] clinic; Less GLV    More GLV; dose decr.      Date 7/11  7/18 7/25 8/1 8/7 8/15 8/22 9/5 9/10 9/17   Total Weekly Dose 17.5mg  18.75mg 18.75mg 20mg 22.5 mg 20mg 20mg 20mg 22.5mg 22.5mg   INR 1.9 1.7 2.0 1.5 1.8  2.1 2.1 1.5 1.7 1.5   Notes     boost boost   boost      Date              Total Weekly Dose 23.75  mg             INR 2.1             Notes boost               Phone Interview:  Tablet Strength: 5mg tablet  Current Maintenance Dose: 5mg daily except 2.5mg MonWedFri  Patient Contact Info: Mikayla Johnson (daughter) # [phone]  Lab Contact Info: Traci    Patient Findings   Negatives:   Signs/symptoms of thrombosis, Signs/symptoms of bleeding, Laboratory test error suspected, Change in health, Change in alcohol use, Change in activity, Upcoming invasive procedure, Emergency department visit, Upcoming dental procedure, Missed doses, Extra doses, Change in medications, Change in diet/appetite, Hospital  admission, Bruising, Other complaints   Comments:   Patient continues to have a serving of GLV ~2x a week (salad or green beans)     Plan:  1. INR is therapeutic and back WNL today at 2.1. Instructed Mrs. Deutsch's daughter, Mikayla, to have her mother continue warfarin 3.75mg daily except 2.5mg on TuesThursSat.   2. Repeat INR in 1 week, 10/1  3. Verbal information was provided over the phone. Mikayla RBV dosing instructions, expresses understanding by teach back, and has no further questions at this time.    Loco Valle, [place]  9/24/2018  4:40 PM     If patient returns SUBtherapeutic, consider dose increase to 23.75mg weekly  IRhonda, PharmD, have reviewed the note in full and agree with the assessment and plan.  09/25/18  11:16 AM

## 2018-10-01 ENCOUNTER — ANTICOAGULATION VISIT (OUTPATIENT)
Dept: PHARMACY | Facility: HOSPITAL | Age: 83
End: 2018-10-01

## 2018-10-01 DIAGNOSIS — I48.20 CHRONIC ATRIAL FIBRILLATION (HCC): ICD-10-CM

## 2018-10-01 LAB — INR PPP: 2.1

## 2018-10-01 NOTE — PROGRESS NOTES
Anticoagulation Clinic - Remote Progress Note  ALERE HOME MONITOR  Frequency of Monitorin days    Indication: Afib  Referring Provider: Luiza  Initial Warfarin Start Date:   Goal INR: 2.0-3.0  Current Drug Interactions: Omeprazole, MVI, CoQ10, Levothyroxine  CHADS-VASc: 4 [HTN, Age>75, Female]  Clinic: 2018    Diet: Green beans, glenroy slaw once week, iceberg salad ~2x a week (18)  Alcohol: None  Tobacco: None  OTC Pain Medication: Tylenol    INR History:  Date 12/11 12/20 1/3/18 1/18 1/31 2/14 2/22 2/28 3/7 3/23 4/2 4/11 4/25   Total Weekly Dose 27.5 mg 27.5 mg 27.5  mg 27.5 mg   27.5mg 27.5 mg 25mg 27.5mg 27.5  mg 27.5 mg 27.5mg 27.5 mg 27.5mg   INR 2.7 3.0 3.0 2.7 2.5 3.5 2.2 2.9 2.4 3.1 2.2 2.9 2.8   Notes       Held 1x    1 lowered dose       Date 5/10 5/17 5/23 6/6 6/13 6/20 6/22 6/27 7/1 7/5   Total Weekly Dose 25mg 27.5mg 27.5mg 26.25 mg 27.5mg 25mg 20mg 20mg 20mg 17.5mg   INR 1.9 2.3 2.4 3.1 3.8; 4.0 3.9 2.4 3.2 2.1 2.6   Notes missed 1x dose, self adj   Less GLV; daughter [place] clinic; Less GLV    More GLV; dose decr.      Date 7/11  7/18 7/25 8/1 8/7 8/15 8/22 9/5 9/10 9/17   Total Weekly Dose 17.5mg  18.75mg 18.75mg 20mg 22.5 mg 20mg 20mg 20mg 22.5mg 22.5mg   INR 1.9 1.7 2.0 1.5 1.8  2.1 2.1 1.5 1.7 1.5   Notes     boost boost   boost      Date 9/24 10/1            Total Weekly Dose 23.75  mg 22.5mg            INR 2.1 2.1            Notes boost               Phone Interview:  Tablet Strength: 5mg tablet  Current Maintenance Dose: 5mg daily except 2.5mg MonWedFri  Patient Contact Info: Mikayla Johnson (daughter) # [phone]  Lab Contact Info: Traci    Patient Findings:  Negatives:   Signs/symptoms of thrombosis, Signs/symptoms of bleeding, Laboratory test error suspected, Change in health, Change in alcohol use, Change in activity, Upcoming invasive procedure, Emergency department visit, Upcoming dental procedure, Missed doses, Extra doses, Change in medications, Change in diet/appetite,  Hospital admission, Bruising, Other complaints     Plan:  1. INR is therapeutic today at 2.1. Instructed Mrs. Deutsch's daughter, Mikayla, to have her mother continue warfarin 3.75mg daily except 2.5mg on TuesThursSat.   2. Repeat INR in two weeks, 10/15.  3. Verbal information was provided over the phone. Mikayla RBV dosing instructions, expresses understanding by teach back, and has no further questions at this time.    Eleanor Patino CPhT  10/1/2018  11:32 AM     I, Loco Freedman, [facility], have reviewed the note in full and agree with the assessment and plan.  10/01/18  11:51 AM

## 2018-10-15 LAB — INR PPP: 2.7

## 2018-10-16 ENCOUNTER — ANTICOAGULATION VISIT (OUTPATIENT)
Dept: PHARMACY | Facility: HOSPITAL | Age: 83
End: 2018-10-16

## 2018-10-16 ENCOUNTER — TELEPHONE (OUTPATIENT)
Dept: PHARMACY | Facility: HOSPITAL | Age: 83
End: 2018-10-16

## 2018-10-16 DIAGNOSIS — I48.20 CHRONIC ATRIAL FIBRILLATION (HCC): ICD-10-CM

## 2018-10-16 NOTE — TELEPHONE ENCOUNTER
Patient's daughter called to report patient forgot to take their 3.75mg dose of warfarin last night and wanted to know what to do. She says patient usually takes warfarin dose around 1800 and thus was not able to take dose within 12 hour window for missed doses today.    Requested patient's daughter have patient continue with maintenance dose and recheck on Mon 10/22 (vs previously advised 10/29)

## 2018-10-16 NOTE — PROGRESS NOTES
Anticoagulation Clinic - Remote Progress Note  [employer]OR  Frequency of Monitorin days    Indication: Afib  Referring Provider: Luiza  Initial Warfarin Start Date:   Goal INR: 2.0-3.0  Current Drug Interactions: Omeprazole, MVI, CoQ10, Levothyroxine  CHADS-VASc: 4 [HTN, Age>75, Female]  Clinic: 2018    Diet: Green beans, glenroy slaw once week, iceberg salad ~2x a week (18)  Alcohol: None  Tobacco: None  OTC Pain Medication: Tylenol    INR History:  Date 12/11 12/20 1/3/18 1/18 1/31 2/14 2/22 2/28 3/7 3/23 4/2 4/11 4/25   Total Weekly Dose 27.5 mg 27.5 mg 27.5  mg 27.5 mg   27.5mg 27.5 mg 25mg 27.5mg 27.5  mg 27.5 mg 27.5mg 27.5 mg 27.5mg   INR 2.7 3.0 3.0 2.7 2.5 3.5 2.2 2.9 2.4 3.1 2.2 2.9 2.8   Notes       Held 1x    1 lowered dose       Date 5/10 5/17 5/23 6/6 6/13 6/20 6/22 6/27 7/1 7/5   Total Weekly Dose 25mg 27.5mg 27.5mg 26.25 mg 27.5mg 25mg 20mg 20mg 20mg 17.5mg   INR 1.9 2.3 2.4 3.1 3.8; 4.0 3.9 2.4 3.2 2.1 2.6   Notes missed 1x dose, self adj   Less GLV; daughter [facility]; Less GLV    More GLV; dose decr.      Date 7/11  7/18 7/25 8/1 8/7 8/15 8/22 9/5 9/10 9/17   Total Weekly Dose 17.5mg  18.75mg 18.75mg 20mg 22.5 mg 20mg 20mg 20mg 22.5mg 22.5mg   INR 1.9 1.7 2.0 1.5 1.8  2.1 2.1 1.5 1.7 1.5   Notes     boost boost   boost      Date 9/24 10/1 10/15           Total Weekly Dose 23.75  mg 22.5mg 22.5mg           INR 2.1 2.1 2.7           Notes boost               Phone Interview:  Tablet Strength: 5mg tablet  Current Maintenance Dose: 5mg daily except 2.5mg Peggy  Patient Contact Info: Mikayla Johnson (daughter) # [phone]  Lab Contact Info: Traci    Patient Findings:  Negatives:   Signs/symptoms of thrombosis, Signs/symptoms of bleeding, Laboratory test error suspected, Change in health, Change in alcohol use, Change in activity, Upcoming invasive procedure, Emergency department visit, Upcoming dental procedure, Missed doses, Extra doses, Change in medications, Change in  diet/appetite, Hospital admission, Bruising, Other complaints   Comments:   Spoke with daughter, Mikayla, and she denies any missed/extra doses, no changes in medication, no changes in diet, no s/sx bleeding or bruising and no scheduled procedures.      Plan:  1. INR is therapeutic again on 10/15 at 2.7. Instructed Mrs. Deutsch's daughter, Mikayla, to have her mother continue warfarin 3.75mg daily except 2.5mg on TuesThursSat.   2. Repeat INR in two weeks, 10/29.    3. Verbal information was provided over the phone. Mikayla RBV dosing instructions, expresses understanding by teach back, and has no further questions at this time.  4. Denies refills at this time.    Madai Fisher, PharmD  Pharmacy Resident  10/16/2018  9:31 AM

## 2018-10-22 ENCOUNTER — ANTICOAGULATION VISIT (OUTPATIENT)
Dept: PHARMACY | Facility: HOSPITAL | Age: 83
End: 2018-10-22

## 2018-10-22 DIAGNOSIS — I48.20 CHRONIC ATRIAL FIBRILLATION (HCC): ICD-10-CM

## 2018-10-22 LAB — INR PPP: 1.8

## 2018-10-22 NOTE — PROGRESS NOTES
Anticoagulation Clinic - Remote Progress Note  [employer]  Frequency of Monitorin days    Indication: Afib  Referring Provider: Luiza  Initial Warfarin Start Date:   Goal INR: 2 - 3  Current Drug Interactions: Omeprazole, MVI, CoQ10, Levothyroxine  CHADS-VASc: 4 [HTN, Age>75, Female]  Clinic: 2018    Diet: Green beans, glenroy slaw once week, iceberg salad ~2x a week (18)  Alcohol: None  Tobacco: None  OTC Pain Medication: Tylenol    INR History:  Date 12/11 12/20 1/3/18 1/18 1/31 2/14 2/22 2/28 3/7 3/23 4/2 4/11 4/25   Total Weekly Dose 27.5 mg 27.5 mg 27.5  mg 27.5 mg   27.5mg 27.5 mg 25mg 27.5mg 27.5  mg 27.5 mg 27.5mg 27.5 mg 27.5mg   INR 2.7 3.0 3.0 2.7 2.5 3.5 2.2 2.9 2.4 3.1 2.2 2.9 2.8   Notes       Held 1x    1 lowered dose       Date 5/10 5/17 5/23 6/6 6/13 6/20 6/22 6/27 7/1 7/5   Total Weekly Dose 25mg 27.5mg 27.5mg 26.25 mg 27.5mg 25mg 20mg 20mg 20mg 17.5mg   INR 1.9 2.3 2.4 3.1 3.8; 4.0 3.9 2.4 3.2 2.1 2.6   Notes missed 1x dose, self adj   Less GLV; daughter [facility]; Less GLV    More GLV; dose decr.      Date 7/11  7/18 7/25 8/1 8/7 8/15 8/22 9/5 9/10 9/17   Total Weekly Dose 17.5mg  18.75mg 18.75mg 20mg 22.5 mg 20mg 20mg 20mg 22.5mg 22.5mg   INR 1.9 1.7 2.0 1.5 1.8  2.1 2.1 1.5 1.7 1.5   Notes     boost boost   boost      Date 9/24 10/1 10/15  10/22          Total Weekly Dose 23.75  mg 22.5mg 22.5mg  18.75mg          INR 2.1 2.1 2.7  1.8          Notes boost    missed 10/15 x 1            Phone Interview:  Tablet Strength: 5mg tablet  Current Maintenance Dose: 5mg daily except 2.5mg MonWedFri  Patient Contact Info: Mikayla Johnson (daughter) # [phone]  Lab Contact Info: Traci    Patient Findings:  Negatives:   Signs/symptoms of thrombosis, Signs/symptoms of bleeding, Laboratory test error suspected, Change in health, Change in alcohol use, Change in activity, Upcoming invasive procedure, Emergency department visit, Upcoming dental procedure, Missed doses, Extra doses,  Change in medications, Change in diet/appetite, Hospital admission, Bruising, Other complaints   Comments:   Her daughter, Mikayla, called last week to let us know her mother missed a 3.75 mg dose on 10/15 (16.7% reduction);  Otherwise, she denies any changes in medication, no changes in diet, no s/sx bleeding or bruising and no scheduled procedures.      Plan:  1. INR is SUB therapeutic today at 1.8. Instructed Mrs. Deutsch's daughter, Mikayla, to have her mother continue warfarin 3.75mg daily except 2.5mg on TuesThursSat.   2. Repeat INR in one week, 10/29, to ensure wnl.    3. Verbal information was provided over the phone. Mikayla RBV dosing instructions, expresses understanding by teach back, and has no further questions at this time.  4. Denies refills at this time.    Reena Moore, PharmD  10/22/2018  1:20 PM

## 2018-10-29 ENCOUNTER — ANTICOAGULATION VISIT (OUTPATIENT)
Dept: PHARMACY | Facility: HOSPITAL | Age: 83
End: 2018-10-29

## 2018-10-29 DIAGNOSIS — I48.20 CHRONIC ATRIAL FIBRILLATION (HCC): ICD-10-CM

## 2018-10-29 LAB — INR PPP: 1.9

## 2018-10-29 NOTE — PROGRESS NOTES
Anticoagulation Clinic - Remote Progress Note  [employer]OR  Frequency of Monitorin days    Indication: Afib  Referring Provider: Luiza  Initial Warfarin Start Date:   Goal INR: 2 - 3  Current Drug Interactions: Omeprazole, MVI, CoQ10, Levothyroxine  CHADS-VASc: 4 [HTN, Age>75, Female]  Clinic: 2018    Diet: Green beans, glenroy slaw once week, iceberg salad ~2x a week (18)  Alcohol: None  Tobacco: None  OTC Pain Medication: Tylenol    INR History:  Date 12/11 12/20 1/3/18 1/18 1/31 2/14 2/22 2/28 3/7 3/23 4/2 4/11 4/25   Total Weekly Dose 27.5 mg 27.5 mg 27.5  mg 27.5 mg   27.5mg 27.5 mg 25mg 27.5mg 27.5  mg 27.5 mg 27.5mg 27.5 mg 27.5mg   INR 2.7 3.0 3.0 2.7 2.5 3.5 2.2 2.9 2.4 3.1 2.2 2.9 2.8   Notes       Held 1x    1 lowered dose       Date 5/10 5/17 5/23 6/6 6/13 6/20 6/22 6/27 7/1 7/5   Total Weekly Dose 25mg 27.5mg 27.5mg 26.25 mg 27.5mg 25mg 20mg 20mg 20mg 17.5mg   INR 1.9 2.3 2.4 3.1 3.8; 4.0 3.9 2.4 3.2 2.1 2.6   Notes missed 1x dose, self adj   Less GLV; daughter [facility]; Less GLV    More GLV; dose decr.      Date 7/11  7/18 7/25 8/1 8/7 8/15 8/22 9/5 9/10 9/17   Total Weekly Dose 17.5mg  18.75mg 18.75mg 20mg 22.5 mg 20mg 20mg 20mg 22.5mg 22.5mg   INR 1.9 1.7 2.0 1.5 1.8  2.1 2.1 1.5 1.7 1.5   Notes     boost boost   boost      Date 9/24 10/1 10/15  10/22 10/29         Total Weekly Dose 23.75  mg 22.5mg 22.5mg  18.75mg 22.5 mg         INR 2.1 2.1 2.7  1.8 1.9         Notes boost    missed 10/15 x 1            Phone Interview:  Tablet Strength: 5mg tablet  Current Maintenance Dose: 5mg daily except 2.5mg MonWedFri  Patient Contact Info: Mikayla Johnson (daughter) # [phone]  Lab Contact Info: Alere    Patient Findings   Negatives:   Signs/symptoms of thrombosis, Signs/symptoms of bleeding, Laboratory test error suspected, Change in health, Change in alcohol use, Change in activity, Upcoming invasive procedure, Emergency department visit, Upcoming dental procedure, Missed doses,  Extra doses, Change in medications, Change in diet/appetite, Hospital admission, Bruising, Other complaints     Plan:  1. INR remains SUBtherapeutic today at 1.9. Given her INR remains SUbtherapeutic will boost slightly at this time to warfarin 3.75mg daily except 2.5mg on ThursSat this week.   2. Repeat INR in one week, 11/5, to ensure wnl.    3. Verbal information was provided over the phone. Mikayla RBV dosing instructions, expresses understanding by teach back, and has no further questions at this time.  4. Denies refills at this time.    Rhonda Singh, PharmD  10/29/2018  11:08 AM

## 2018-11-05 LAB — INR PPP: 2.1

## 2018-11-06 ENCOUNTER — ANTICOAGULATION VISIT (OUTPATIENT)
Dept: PHARMACY | Facility: HOSPITAL | Age: 83
End: 2018-11-06

## 2018-11-06 DIAGNOSIS — I48.20 CHRONIC ATRIAL FIBRILLATION (HCC): ICD-10-CM

## 2018-11-06 NOTE — PROGRESS NOTES
Anticoagulation Clinic - Remote Progress Note  [employer]ITOR  Frequency of Monitorin days    Indication: chronic afib  Referring Provider: Luiza  Initial Warfarin Start Date:   Goal INR: 2.0-3.0  Current Drug Interactions: Omeprazole, MVI, CoQ10, Levothyroxine  CHADS-VASc: 4 [HTN, Age>75, Female]  Clinic: 2018    Diet: Green beans, glenroy slaw once week, iceberg salad ~2x a week (18)  Alcohol: None  Tobacco: None  OTC Pain Medication: Tylenol    INR History:  Date 12/11 12/20 1/3/18 1/18 1/31 2/14 2/22 2/28 3/7 3/23 4/2 4/11 4/25   Total Weekly Dose 27.5 mg 27.5 mg 27.5  mg 27.5 mg   27.5mg 27.5 mg 25mg 27.5mg 27.5  mg 27.5 mg 27.5mg 27.5 mg 27.5mg   INR 2.7 3.0 3.0 2.7 2.5 3.5 2.2 2.9 2.4 3.1 2.2 2.9 2.8   Notes       Held 1x    1 lowered dose       Date 5/10 5/17 5/23 6/6 6/13 6/20 6/22 6/27 7/1 7/5   Total Weekly Dose 25mg 27.5mg 27.5mg 26.25mg 27.5mg 25mg 20mg 20mg 20mg 17.5mg   INR 1.9 2.3 2.4 3.1 3.8; 4.0 3.9 2.4 3.2 2.1 2.6   Notes missed 1x dose, self adj   Less GLV; daughter [facility]; Less GLV    More GLV; dose decr.      Date 7/11  7/18 7/25 8/1 8/7 8/15 8/22 9/5 9/10 9/17   Total Weekly Dose 17.5mg  18.75mg 18.75mg 20mg 22.5mg 20mg 20mg 20mg 22.5mg 22.5mg   INR 1.9 1.7 2.0 1.5 1.8 2.1 2.1 1.5 1.7 1.5   Notes     boost boost   boost      Date 9/24 10/1 10/15 10/22 10/29 11/5        Total Weekly Dose 23.75  mg 22.5mg 22.5mg 18.75mg 22.5mg 23.75mg 23.75mg       INR 2.1 2.1 2.7 1.8 1.9 2.1        Notes boost   missed 10/15 x 1  1x incr dose          Phone Interview:  Tablet Strength: 5mg tablet  Patient Contact Info: Mikayla Johnson (daughter) [phone]  Lab Contact Info: Alere    Patient Findings   Negatives:   Signs/symptoms of thrombosis, Signs/symptoms of bleeding, Laboratory test error suspected, Change in health, Change in alcohol use, Change in activity, Upcoming invasive procedure, Emergency department visit, Upcoming dental procedure, Missed doses, Extra doses, Change in  medications, Change in diet/appetite, Hospital admission, Bruising, Other complaints     Plan:  1. INR was therapeutic and back WNL on 11/5 at 2.1. After consulting with Leena Davis, PharmD, instructed patient's daughter, Mikayla, to have patient continue recently increased dose of warfarin 3.75mg daily except 2.5mg on ThursSat (23.75mg/week)  2. Repeat INR in one week, 11/12.    3. Verbal information provided over the phone. Adelaide Deutsch's daughter, Mikayla, RBV dosing instructions, expresses understanding by teach back, and has no further questions at this time.  4. Patient's daughter declines the need for warfarin refills at this time.  5. Adelaide Deutsch's daughter, Mikayla, reports that her Roche Test Strip lot number is [id number]. This is one of the lot numbers that is affected by the FDA Class 1 and Roche recall. Adelaide Deutsch will no longer use these test strips and voices understanding. she will request new test strips from [employer] [facility] [employer]. she will plan to call us the day before her next scheduled INR if she has not yet received a new lot. We will then send in a standing order to a lab of her preference.      Loco Valle, Kendall  11/6/2018  10:20 AM      I, Rhonda Singh, PharmD, have reviewed the note in full and agree with the assessment and plan.  11/06/18  12:39 PM

## 2018-11-19 ENCOUNTER — ANTICOAGULATION VISIT (OUTPATIENT)
Dept: PHARMACY | Facility: HOSPITAL | Age: 83
End: 2018-11-19

## 2018-11-19 DIAGNOSIS — I48.20 CHRONIC ATRIAL FIBRILLATION (HCC): ICD-10-CM

## 2018-11-19 LAB — INR PPP: 2.4

## 2018-11-19 NOTE — PROGRESS NOTES
Anticoagulation Clinic - Remote Progress Note  [employer]ITOR  Frequency of Monitorin days    Indication: chronic afib  Referring Provider: Luiza  Initial Warfarin Start Date:   Goal INR: 2.0-3.0  Current Drug Interactions: Omeprazole, MVI, CoQ10, Levothyroxine  CHADS-VASc: 4 [HTN, Age>75, Female]  Clinic: 2018    Diet: Green beans, glenroy slaw once week, iceberg salad ~2x a week (18)  Alcohol: None  Tobacco: None  OTC Pain Medication: Tylenol    INR History:  Date 12/11 12/20 1/3/18 1/18 1/31 2/14 2/22 2/28 3/7 3/23 4/2 4/11 4/25   Total Weekly Dose 27.5 mg 27.5 mg 27.5  mg 27.5 mg   27.5mg 27.5 mg 25mg 27.5mg 27.5  mg 27.5 mg 27.5mg 27.5 mg 27.5mg   INR 2.7 3.0 3.0 2.7 2.5 3.5 2.2 2.9 2.4 3.1 2.2 2.9 2.8   Notes       Held 1x    1 lowered dose       Date 5/10 5/17 5/23 6/6 6/13 6/20 6/22 6/27 7/1 7/5   Total Weekly Dose 25mg 27.5mg 27.5mg 26.25mg 27.5mg 25mg 20mg 20mg 20mg 17.5mg   INR 1.9 2.3 2.4 3.1 3.8; 4.0 3.9 2.4 3.2 2.1 2.6   Notes missed 1x dose, self adj   Less GLV; daughter [facility]; Less GLV    More GLV; dose decr.      Date 7/11  7/18 7/25 8/1 8/7 8/15 8/22 9/5 9/10 9/17   Total Weekly Dose 17.5mg  18.75mg 18.75mg 20mg 22.5mg 20mg 20mg 20mg 22.5mg 22.5mg   INR 1.9 1.7 2.0 1.5 1.8 2.1 2.1 1.5 1.7 1.5   Notes     boost boost   boost      Date 9/24 10/1 10/15 10/22 10/29 11/5 11/19       Total Weekly Dose 23.75  mg 22.5mg 22.5mg 18.75mg 22.5mg 23.75mg 23.75mg       INR 2.1 2.1 2.7 1.8 1.9 2.1 2.4       Notes boost   missed 10/15 x 1  1x incr dose          Phone Interview:  Tablet Strength: 5mg tablet  Patient Contact Info: Mikayla Johnson (daughter) [phone]  Lab Contact Info: Traci    Patient Findings:  Negatives:  Signs/symptoms of thrombosis, Signs/symptoms of bleeding, Laboratory test error suspected, Change in health, Change in alcohol use, Change in activity, Upcoming invasive procedure, Emergency department visit, Upcoming dental procedure, Missed doses, Extra doses, Change  in medications, Change in diet/appetite, Hospital admission, Bruising, Other complaints     Plan:  1. INR is therapeutic today at 2.4. Instructed patient's daughter, Mikayla, to have patient continue warfarin 3.75mg daily except 2.5mg on ThursSat.  2. Repeat INR in one week 11/2..    3. Verbal information provided over the phone. Adelaide Deutsch's daughter, Mikayla, RBV dosing instructions, expresses understanding by teach back, and has no further questions at this time.  4. Patient's daughter declines the need for warfarin refills at this time.  5. Of note, patient received new test strips.    Eleanor Patino, Kendall  11/19/2018  12:11 PM     Given recent increase in warfarin dose and steady trend upward in INR will plan to have Mrs. Deutsch repeat her INR next week. Have updated note above. Eleanor called and spoke with daughter who is agreeable to this.  I, Rhonda Singh, PharmD, have reviewed the note in full and agree with the assessment and plan.  11/20/18  5:37 PM

## 2018-11-20 NOTE — PROGRESS NOTES
Addendum: Spoke with patient daughter, Mikayla, who states they will test 11/27 per Rhonda Singh, PharmD, recommendation.

## 2018-11-26 ENCOUNTER — ANTICOAGULATION VISIT (OUTPATIENT)
Dept: PHARMACY | Facility: HOSPITAL | Age: 83
End: 2018-11-26

## 2018-11-26 DIAGNOSIS — I48.20 CHRONIC ATRIAL FIBRILLATION (HCC): ICD-10-CM

## 2018-11-26 LAB — INR PPP: 2.5

## 2018-11-26 NOTE — PROGRESS NOTES
Anticoagulation Clinic - Remote Progress Note  [employer]OR  Frequency of Monitorin days    Indication: chronic afib  Referring Provider: Luiza  Initial Warfarin Start Date:   Goal INR: 2.0-3.0  Current Drug Interactions: Omeprazole, MVI, CoQ10, Levothyroxine  CHADS-VASc: 4 [HTN, Age>75, Female]  Clinic: 2018    Diet: Green beans, glenroy slaw once week, iceberg salad ~2x a week (18)  Alcohol: None  Tobacco: None  OTC Pain Medication: Tylenol    INR History:  Date 12/11 12/20 1/3/18 1/18 1/31 2/14 2/22 2/28 3/7 3/23 4/2 4/11 4/25   Total Weekly Dose 27.5 mg 27.5 mg 27.5  mg 27.5 mg   27.5mg 27.5 mg 25mg 27.5mg 27.5  mg 27.5 mg 27.5mg 27.5 mg 27.5  mg   INR 2.7 3.0 3.0 2.7 2.5 3.5 2.2 2.9 2.4 3.1 2.2 2.9 2.8   Notes       Held 1x    1 decr dose       Date 5/10 5/17 5/23 6/6 6/13 6/20 6/22 6/27 7/1 7/5   Total Weekly Dose 25mg 27.5mg 27.5mg 26.25mg 27.5mg 25mg 20mg 20mg 20mg 17.5mg   INR 1.9 2.3 2.4 3.1 3.8; 4.0 3.9 2.4 3.2 2.1 2.6   Notes missed 1x dose, self adj   Less GLV; daughter [facility]; Less GLV    More GLV; dose decr.      Date 7/11  7/18 7/25 8/1 8/7 8/15 8/22 9/5 9/10 9/17   Total Weekly Dose 17.5mg  18.75mg 18.75mg 20mg 22.5mg 20mg 20mg 20mg 22.5mg 22.5mg   INR 1.9 1.7 2.0 1.5 1.8 2.1 2.1 1.5 1.7 1.5   Notes     boost boost   boost      Date 9/24 10/1 10/15 10/22 10/29 11/5 11/19 11/26      Total Weekly Dose 23.75  mg 22.5mg 22.5mg 18.75mg 22.5mg 23.75mg 23.75mg 23.75mg      INR 2.1 2.1 2.7 1.8 1.9 2.1 2.4 2.5      Notes boost   missed 10/15 x 1  1x incr dose          Phone Interview:  Tablet Strength: 5mg tablet  Patient Contact Info: Mikayla Cell (daughter) [phone]    Patient Findings   Negatives:  Signs/symptoms of thrombosis, Signs/symptoms of bleeding, Laboratory test error suspected, Change in health, Change in alcohol use, Change in activity, Upcoming invasive procedure, Emergency department visit, Upcoming dental procedure, Missed doses, Extra doses, Change in  medications, Change in diet/appetite, Hospital admission, Bruising, Other complaints     Plan:  1. INR was therapeutic 11/26 at 2.5. Instructed patient's daughter, Mikayla, to have patient continue warfarin 3.75mg daily except 2.5mg on ThursSat.  2. Repeat INR in 2 weeks, 12/10  3. Verbal information provided over the phone. Adelaide Deutsch's daughter, Mikayla, RBV dosing instructions, expresses understanding by teach back, and has no further questions at this time.  4. Patient's daughter declines the need for warfarin refills at this time.    Loco Valle CPhT  11/26/2018  4:08 PM    I, Leena Davis, [facility], have reviewed the note in full and agree with the assessment and plan.  11/27/18  10:11 AM

## 2018-12-10 ENCOUNTER — ANTICOAGULATION VISIT (OUTPATIENT)
Dept: PHARMACY | Facility: HOSPITAL | Age: 83
End: 2018-12-10

## 2018-12-10 DIAGNOSIS — I48.20 CHRONIC ATRIAL FIBRILLATION (HCC): ICD-10-CM

## 2018-12-10 LAB — INR PPP: 2.2

## 2018-12-10 NOTE — PROGRESS NOTES
Anticoagulation Clinic - Remote Progress Note  AL[employer] MONITOR  Frequency of Monitorin days    Indication: chronic afib  Referring Provider: Luiza  Initial Warfarin Start Date:   Goal INR: 2.0-3.0  Current Drug Interactions: Omeprazole, MVI, CoQ10, Levothyroxine  CHADS-VASc: 4 [HTN, Age>75, Female]  Clinic: 2018    Diet: Green beans, glenroy slaw once week, iceberg salad ~2x a week (18)  Alcohol: None  Tobacco: None  OTC Pain Medication: Tylenol    INR History:  Date 12/11 12/20 1/3/18 1/18 1/31 2/14 2/22 2/28 3/7 3/23 4/2 4/11 4/25   Total Weekly Dose 27.5 mg 27.5 mg 27.5  mg 27.5 mg   27.5mg 27.5 mg 25mg 27.5mg 27.5  mg 27.5 mg 27.5mg 27.5 mg 27.5  mg   INR 2.7 3.0 3.0 2.7 2.5 3.5 2.2 2.9 2.4 3.1 2.2 2.9 2.8   Notes       Held 1x    1 decr dose       Date 5/10 5/17 5/23 6/6 6/13 6/20 6/22 6/27 7/1 7/5   Total Weekly Dose 25mg 27.5mg 27.5mg 26.25mg 27.5mg 25mg 20mg 20mg 20mg 17.5mg   INR 1.9 2.3 2.4 3.1 3.8; 4.0 3.9 2.4 3.2 2.1 2.6   Notes missed 1x dose, self adj   Less GLV; daughter [place] [facility]; Less GLV    More GLV; dose decr.      Date 7/11  7/18 7/25 8/1 8/7 8/15 8/22 9/5 9/10 9/17   Total Weekly Dose 17.5mg  18.75mg 18.75mg 20mg 22.5mg 20mg 20mg 20mg 22.5mg 22.5mg   INR 1.9 1.7 2.0 1.5 1.8 2.1 2.1 1.5 1.7 1.5   Notes     boost boost   boost      Date 9/24 10/1 10/15 10/22 10/29 11/5 11/19 11/26 12/10     Total Weekly Dose 23.75  mg 22.5mg 22.5mg 18.75mg 22.5mg 23.75mg 23.75mg 23.75mg 23.75mg     INR 2.1 2.1 2.7 1.8 1.9 2.1 2.4 2.5 2.2     Notes boost   missed 10/15 x 1  1x incr dose          Phone Interview:  Tablet Strength: 5mg tablet  Patient Contact Info: Mikayla Johnson (daughter) [phone]    Patient Findings:  Negatives:  Signs/symptoms of thrombosis, Signs/symptoms of bleeding, Laboratory test error suspected, Change in health, Change in alcohol use, Change in activity, Upcoming invasive procedure, Emergency department visit, Upcoming dental procedure, Missed doses, Extra doses,  Change in medications, Change in diet/appetite, Hospital admission, Bruising, Other complaints     Plan:  1. INR is therapeutic today at 2.2. Instructed patient's daughter, Mikayla, to have patient continue warfarin 3.75mg daily except 2.5mg on ThursSat.  2. Repeat INR in two weeks, 12/26.  3. Verbal information provided over the phone. Adelaide Deutsch's daughter, Mikayla, RBV dosing instructions, expresses understanding by teach back, and has no further questions at this time.  4. Patient's daughter declines the need for warfarin refills at this time.    Eleanor Patino, [place]  12/10/2018  4:39 PM      I, Rhonda Singh, PharmD, have reviewed the note in full and agree with the assessment and plan.  12/11/18  8:12 AM

## 2018-12-24 LAB — INR PPP: 1.9

## 2018-12-26 ENCOUNTER — ANTICOAGULATION VISIT (OUTPATIENT)
Dept: PHARMACY | Facility: HOSPITAL | Age: 83
End: 2018-12-26

## 2018-12-26 DIAGNOSIS — I48.20 CHRONIC ATRIAL FIBRILLATION (HCC): ICD-10-CM

## 2018-12-26 NOTE — PROGRESS NOTES
Anticoagulation Clinic - Remote Progress Note  AL[employer] MONITOR  Frequency of Monitorin days    Indication: chronic afib  Referring Provider: Luiza  Initial Warfarin Start Date:   Goal INR: 2.0-3.0  Current Drug Interactions: Omeprazole, MVI, CoQ10, Levothyroxine  CHADS-VASc: 4 [HTN, Age>75, Female]  Clinic: 2018    Diet: Green beans, glenroy slaw once week, iceberg salad ~2x a week (18)  Alcohol: None  Tobacco: None  OTC Pain Medication: Tylenol    INR History:  Date 12/11 12/20 1/3/18 1/18 1/31 2/14 2/22 2/28 3/7 3/23 4/2 4/11 4/25   Total Weekly Dose 27.5 mg 27.5 mg 27.5  mg 27.5 mg   27.5mg 27.5 mg 25mg 27.5mg 27.5  mg 27.5 mg 27.5mg 27.5 mg 27.5  mg   INR 2.7 3.0 3.0 2.7 2.5 3.5 2.2 2.9 2.4 3.1 2.2 2.9 2.8   Notes       Held 1x    1 decr dose       Date 5/10 5/17 5/23 6/6 6/13 6/20 6/22 6/27 7/1 7/5   Total Weekly Dose 25mg 27.5mg 27.5mg 26.25mg 27.5mg 25mg 20mg 20mg 20mg 17.5mg   INR 1.9 2.3 2.4 3.1 3.8; 4.0 3.9 2.4 3.2 2.1 2.6   Notes missed 1x dose, self adj   Less GLV; daughter [place] [facility]; Less GLV    More GLV; dose decr.      Date 7/11  7/18 7/25 8/1 8/7 8/15 8/22 9/5 9/10 9/17   Total Weekly Dose 17.5mg  18.75mg 18.75mg 20mg 22.5mg 20mg 20mg 20mg 22.5mg 22.5mg   INR 1.9 1.7 2.0 1.5 1.8 2.1 2.1 1.5 1.7 1.5   Notes     boost boost   boost      Date 9/24 10/1 10/15 10/22 10/29 11/5 11/19 11/26 12/10 12/24    Total Weekly Dose 23.75  mg 22.5mg 22.5mg 18.75mg 22.5mg 23.75mg 23.75mg 23.75mg 23.75mg 22.5mg    INR 2.1 2.1 2.7 1.8 1.9 2.1 2.4 2.5 2.2 1.9    Notes boost   missed 10/15 x 1  1x incr dose    Took 1 pill instead of 1.5 on one day.      Phone Interview:  Tablet Strength: 5mg tablet  Patient Contact Info: Mikayla Chaves Cell (daughter) [phone]    Patient Findings:  Positives:  Missed doses   Negatives:  Signs/symptoms of thrombosis, Signs/symptoms of bleeding, Laboratory test error suspected, Change in health, Change in alcohol use, Change in activity, Upcoming invasive procedure,  Emergency department visit, Upcoming dental procedure, Extra doses, Change in medications, Change in diet/appetite, Hospital admission, Bruising, Other complaints   Comments:  Had some bruising, but not out of the ordinary. Patient's daughter believes that she took only 1 tablet instead of one and a half tablets one day in the past week, but is unsure of what day that occurred.        Plan:  1. INR is subtherapeutic at 1.9 on 12/24. Instructed patient's daughter, Mikayla, to have patient continue warfarin 3.75mg daily except 2.5mg on ThursSat.  2. Repeat INR in one week, 12/31.  3. Verbal information provided over the phone. Adelaide Deutsch's daughter, Mikayla, RBV dosing instructions, expresses understanding by teach back, and has no further questions at this time.  4. Patient's daughter declines the need for warfarin refills at this time.     Dominique Diaz, Pharmacy Intern  12/26/18  9:16 AM     IRama, [facility], have reviewed the note in full and agree with the assessment and plan.  12/26/18  4:01 PM

## 2018-12-31 ENCOUNTER — ANTICOAGULATION VISIT (OUTPATIENT)
Dept: PHARMACY | Facility: HOSPITAL | Age: 83
End: 2018-12-31

## 2018-12-31 DIAGNOSIS — I48.20 CHRONIC ATRIAL FIBRILLATION (HCC): ICD-10-CM

## 2018-12-31 LAB — INR PPP: 2.3

## 2018-12-31 NOTE — PROGRESS NOTES
Anticoagulation Clinic - Remote Progress Note  ALERE HOME MONITOR  Frequency of Monitorin days    Indication: chronic afib  Referring Provider: Luiza  Initial Warfarin Start Date:   Goal INR: 2.0-3.0  Current Drug Interactions: Omeprazole, MVI, CoQ10, Levothyroxine  CHADS-VASc: 4 [HTN, Age>75, Female]  Clinic: 2018    Diet: Green beans, glenroy slaw once week, iceberg salad ~2x a week (18)  Alcohol: None  Tobacco: None  OTC Pain Medication: Tylenol    INR History:  Date 12/11 12/20 1/3/18 1/18 1/31 2/14 2/22 2/28 3/7 3/23 4/2 4/11 4/25   Total Weekly Dose 27.5 mg 27.5 mg 27.5  mg 27.5 mg   27.5mg 27.5 mg 25mg 27.5mg 27.5  mg 27.5 mg 27.5mg 27.5 mg 27.5  mg   INR 2.7 3.0 3.0 2.7 2.5 3.5 2.2 2.9 2.4 3.1 2.2 2.9 2.8   Notes       Held 1x    1 decr dose       Date 5/10 5/17 5/23 6/6 6/13 6/20 6/22 6/27 7/1 7/5   Total Weekly Dose 25mg 27.5mg 27.5mg 26.25mg 27.5mg 25mg 20mg 20mg 20mg 17.5mg   INR 1.9 2.3 2.4 3.1 3.8; 4.0 3.9 2.4 3.2 2.1 2.6   Notes missed 1x dose, self adj   Less GLV; daughter [facility]; Less GLV    More GLV; dose decr.      Date 7/11  7/18 7/25 8/1 8/7 8/15 8/22 9/5 9/10 9/17   Total Weekly Dose 17.5mg  18.75mg 18.75mg 20mg 22.5mg 20mg 20mg 20mg 22.5mg 22.5mg   INR 1.9 1.7 2.0 1.5 1.8 2.1 2.1 1.5 1.7 1.5   Notes     boost boost   boost      Date 9/24 10/1 10/15 10/22 10/29 11/5 11/19 11/26 12/10 12/24 12/31   Total Weekly Dose 23.75  mg 22.5mg 22.5mg 18.75mg 22.5mg 23.75mg 23.75mg 23.75mg 23.75mg 22.5mg 23.75mg   INR 2.1 2.1 2.7 1.8 1.9 2.1 2.4 2.5 2.2 1.9 2.3   Notes boost   missed 10/15 x 1  1x incr dose    Mis-dose      Phone Interview:  Tablet Strength: 5mg tablet  Patient Contact Info: Mikayla Chaves Cell (daughter) [phone]    Patient Findings:  Negatives:  Signs/symptoms of thrombosis, Signs/symptoms of bleeding, Laboratory test error suspected, Change in health, Change in alcohol use, Change in activity, Upcoming invasive procedure, Emergency department visit, Upcoming dental  procedure, Missed doses, Extra doses, Change in medications, Change in diet/appetite, Hospital admission, Bruising, Other complaints   Comments:  Patient's daughter, Mikayla, denies any changes since last encounter     Plan:  1. INR is back WNL today at 2.3. Instructed patient's daughter, Mikayla, to have patient continue warfarin 3.75mg daily except 2.5mg on ThursSat.  2. Repeat INR in two weeks, 1/14, per patient daughter preference. Recommended one week but Mikayla would rather wait two.   3. Verbal information provided over the phone. Adelaide Deutsch's daughter, Mikayla, RBV dosing instructions, expresses understanding by teach back, and has no further questions at this time.  4. Patient's daughter declines the need for warfarin refills at this time.     Eleanor Patino, Pharmacy Technician  12/31/18  12:10 PM       IRhonda, PharmD, have reviewed the note in full and agree with the assessment and plan.  12/31/18  4:33 PM

## 2019-01-01 ENCOUNTER — ANTICOAGULATION VISIT (OUTPATIENT)
Dept: PHARMACY | Facility: HOSPITAL | Age: 84
End: 2019-01-01

## 2019-01-01 ENCOUNTER — LAB REQUISITION (OUTPATIENT)
Dept: LAB | Facility: HOSPITAL | Age: 84
End: 2019-01-01

## 2019-01-01 DIAGNOSIS — I48.20 CHRONIC ATRIAL FIBRILLATION (HCC): ICD-10-CM

## 2019-01-01 DIAGNOSIS — I48.20 CHRONIC ATRIAL FIBRILLATION (HCC): Primary | ICD-10-CM

## 2019-01-01 DIAGNOSIS — Z00.00 ROUTINE GENERAL MEDICAL EXAMINATION AT A HEALTH CARE FACILITY: ICD-10-CM

## 2019-01-01 LAB
INR PPP: 1.9
INR PPP: 1.9
INR PPP: 2.3
INR PPP: 2.3
INR PPP: 2.4
INR PPP: 2.4
INR PPP: 2.5
INR PPP: 2.9
INR PPP: 3.1
INR PPP: 3.3
INR PPP: 3.4
INR PPP: 3.5
INR PPP: 3.75 (ref 0.9–1.1)
INR PPP: 4
INR PPP: 4.8
PROTHROMBIN TIME: 36.8 SECONDS (ref 11.7–14.2)

## 2019-01-01 PROCEDURE — 85610 PROTHROMBIN TIME: CPT

## 2019-01-14 ENCOUNTER — ANTICOAGULATION VISIT (OUTPATIENT)
Dept: PHARMACY | Facility: HOSPITAL | Age: 84
End: 2019-01-14

## 2019-01-14 DIAGNOSIS — I48.20 CHRONIC ATRIAL FIBRILLATION (HCC): ICD-10-CM

## 2019-01-14 LAB — INR PPP: 2.3

## 2019-01-14 RX ORDER — CITALOPRAM 10 MG/1
10 TABLET ORAL DAILY
COMMUNITY

## 2019-01-14 NOTE — PROGRESS NOTES
Anticoagulation Clinic - Remote Progress Note  ALERE HOME MONITOR  Frequency of Monitorin days    Indication: chronic afib  Referring Provider: Luiza  Initial Warfarin Start Date:   Goal INR: 2.0-3.0  Current Drug Interactions: Omeprazole, MVI, CoQ10, Levothyroxine  CHADS-VASc: 4 [HTN, Age>75, Female]  Clinic: 2018    Diet: Green beans, glenroy slaw once week, iceberg salad ~2x a week (18)  Alcohol: None  Tobacco: None  OTC Pain Medication: Tylenol    INR History:  Date 12/11 12/20 1/3/18 1/18 1/31 2/14 2/22 2/28 3/7 3/23 4/2 4/11 4/25   Total Weekly Dose 27.5 mg 27.5 mg 27.5  mg 27.5 mg   27.5mg 27.5 mg 25mg 27.5mg 27.5  mg 27.5 mg 27.5mg 27.5 mg 27.5  mg   INR 2.7 3.0 3.0 2.7 2.5 3.5 2.2 2.9 2.4 3.1 2.2 2.9 2.8   Notes       Held 1x    1 decr dose       Date 5/10 5/17 5/23 6/6 6/13 6/20 6/22 6/27 7/1 7/5   Total Weekly Dose 25mg 27.5mg 27.5mg 26.25mg 27.5mg 25mg 20mg 20mg 20mg 17.5mg   INR 1.9 2.3 2.4 3.1 3.8; 4.0 3.9 2.4 3.2 2.1 2.6   Notes missed 1x dose, self adj   Less GLV; daughter [place] clinic; Less GLV    More GLV; dose decr.      Date 7/11  7/18 7/25 8/1 8/7 8/15 8/22 9/5 9/10 9/17   Total Weekly Dose 17.5mg  18.75mg 18.75mg 20mg 22.5mg 20mg 20mg 20mg 22.5mg 22.5mg   INR 1.9 1.7 2.0 1.5 1.8 2.1 2.1 1.5 1.7 1.5   Notes     boost boost   boost      Date 9/24 10/1 10/15 10/22 10/29 11/5 11/19 11/26 12/10 12/24 12/31   Total Weekly Dose 23.75  mg 22.5mg 22.5mg 18.75mg 22.5mg 23.75mg 23.75mg 23.75mg 23.75mg 22.5mg 23.75mg   INR 2.1 2.1 2.7 1.8 1.9 2.1 2.4 2.5 2.2 1.9 2.3   Notes boost   missed 10/15 x 1  1x incr dose    Mis-dose      Date        Total Weekly Dose 23.75mg       INR 2.3       Notes          Phone Interview:  Tablet Strength: 5mg tablet  Patient Contact Info: Mikayla Chaves Cell (daughter) [phone]    Patient Findings:  Positives:  Change in medications   Negatives:  Signs/symptoms of thrombosis, Signs/symptoms of bleeding, Laboratory test error suspected, Change in health,  Change in alcohol use, Change in activity, Upcoming invasive procedure, Emergency department visit, Upcoming dental procedure, Missed doses, Extra doses, Change in diet/appetite, Hospital admission, Bruising, Other complaints   Comments:  Mikayla reports patient started Celexa 10mg on 1/3/19. Micromedex indicates an increased risk of bleeding. Mikayla otherwise notes no changes with the patient.     Plan:  1. INR is therapeutic today. Instructed patient's daughter, Mikayla, to have patient continue warfarin 3.75mg daily except 2.5mg on ThursSat.  2. Repeat INR in two weeks, 1/28.  3. Verbal information provided over the phone. Adelaide Deutsch's daughter, Mikayla, RBV dosing instructions, expresses understanding by teach back, and has no further questions at this time.  4. Patient's daughter declines the need for warfarin refills at this time.     Eleanor Patino, Pharmacy Technician  01/14/19  1:20 PM     Erinn SHIN, [facility], have reviewed the note in full and agree with the assessment and plan.  01/14/19  4:42 PM

## 2019-01-16 ENCOUNTER — OFFICE VISIT (OUTPATIENT)
Dept: CARDIOLOGY | Facility: CLINIC | Age: 84
End: 2019-01-16

## 2019-01-16 VITALS
SYSTOLIC BLOOD PRESSURE: 148 MMHG | BODY MASS INDEX: 20.77 KG/M2 | DIASTOLIC BLOOD PRESSURE: 68 MMHG | HEART RATE: 90 BPM | HEIGHT: 60 IN | WEIGHT: 105.8 LBS

## 2019-01-16 DIAGNOSIS — I49.5 TACHY-BRADY SYNDROME (HCC): ICD-10-CM

## 2019-01-16 PROCEDURE — 93279 PRGRMG DEV EVAL PM/LDLS PM: CPT | Performed by: PHYSICIAN ASSISTANT

## 2019-01-16 PROCEDURE — 99214 OFFICE O/P EST MOD 30 MIN: CPT | Performed by: PHYSICIAN ASSISTANT

## 2019-01-16 RX ORDER — LEVOTHYROXINE SODIUM 0.07 MG/1
75 TABLET ORAL DAILY
COMMUNITY

## 2019-01-16 RX ORDER — METOPROLOL TARTRATE 50 MG/1
75 TABLET, FILM COATED ORAL 2 TIMES DAILY
Qty: 60 TABLET | Refills: 3 | Status: SHIPPED | OUTPATIENT
Start: 2019-01-16 | End: 2019-03-03 | Stop reason: SDUPTHER

## 2019-01-16 NOTE — PROGRESS NOTES
[facility] at [facility]   OFFICE NOTE      Adelaide Deutsch  6/11/1924  PCP: Nathalia SOTO    SUBJECTIVE:   Adelaide Deutsch is a 89 y.o. female seen for a follow up visit regarding the following: Afib, HTN, Severe Bradycardia     CC:Afib   PROBLEM LIST:  1. Tachybrady syndrome:  a. Holter monitor, 11/22/2006, showing heart rate ; average 59 BPM with 29 PVCs, 2,735 PACs, with short episodes of nonsustained atrial tachycardia.   b. Echocardiogram, 11/22/2006: Moderate LAE, EF 65%  c. Persantine 01/04/2007, low probability of ischemia, EF 68%.   d. Implantation of a dual-chamber permanent pacemaker, 07/12/2007 ([facility]).   e. Interrogation of pacemaker in September 2007, consistent with atrial fibrillation and atrial flutter with subsequent initiation of Coumadin and Toprol due to rapid ventricular rates.   f. Conversion to normal sinus rhythm with treatment of sotalol therapy, 10/19/2007.  g. Chronic atrial fibrillation with adequate ventricular rate control on metoprolol.   2. Hypertension.  3. Hyperlipidemia.   4. Anemia.   5. Migraines.   6. Arthritis.   7. Gastroesophageal reflux disease/hiatal hernia.   8. Hypothyroidism.   9. Osteoporosis.   10. Status post cataract removal.     HPI:   The patient is a very pleasant 89-year-old female presents today accompanied by her  and her daughter.  The patient has had some increase in shortness of breath and palpitations and physical activities lately.  She has a known history of atrial fibrillation, hypertension, and tachycardia bradycardia syndrome status post remote [employer] pacemaker.  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  The daughter reports that recently she's loaded more fatigued and short of breath when she tries to do physical activities.  She denies any chest pain or chest tightness suggesting as pectoris.  She denies any dizziness, near-syncope or syncope.  She denies any heart failure symptoms  such as orthopnea PND or peripheral edema.        ROS:  Review of Symptoms:  General: no recent weight loss/gain, weakness or fatigue  Skin: no rashes, lumps, or other skin changes  HEENT: [place]  Respiratory: no cough or hemoptysis  Cardiovascular: + palpitations, and tachycardia  Gastrointestinal: no black/tarry stools or diarrhea  Urinary: no change in frequency or urgency  Peripheral Vascular: no claudication or leg cramps  Musculoskeletal: no muscle or joint pain/stiffness  Psychiatric: no depression or excessive stress  Neurological: no sensory or motor loss, no syncope  Hematologic: no anemia, easy bruising or bleeding  Endocrine: no thyroid problems, nor heat or cold intolerance    Cardiac PMH: (Old records have been reviewed and summarized below)      Past Medical History, Past Surgical History, Family history, Social History, and Medications were all reviewed with the patient today and updated as necessary.       Current Outpatient Medications:   •  Carboxymethylcell-Hypromellose (GENTEAL OP), Administer 1 drop to both eyes Every Night., Disp: , Rfl:   •  carboxymethylcellulose (RETAINE CMC) 0.5 % solution, Administer 1 drop to both eyes 4 (Four) Times a Day As Needed for Dry Eyes., Disp: , Rfl:   •  citalopram (CeleXA) 10 MG tablet, Take 10 mg by mouth Daily., Disp: , Rfl:   •  coenzyme Q10 100 MG capsule, Take 100 mg by mouth Daily., Disp: , Rfl:   •  Cyanocobalamin (VITAMIN B-12 IJ), Inject  as directed Every 30 (Thirty) Days., Disp: , Rfl:   •  ferrous sulfate 325 (65 FE) MG tablet, Take 325 mg by mouth Daily With Breakfast., Disp: , Rfl:   •  furosemide (LASIX) 20 MG tablet, Take 1 tablet by mouth Daily., Disp: 90 tablet, Rfl: 2  •  levothyroxine (SYNTHROID, LEVOTHROID) 75 MCG tablet, Take 75 mcg by mouth Daily., Disp: , Rfl:   •  melatonin 5 MG tablet tablet, Take 5 mg by mouth At Night As Needed., Disp: , Rfl:   •  Multiple Vitamin (MULTI VITAMIN DAILY PO), Take 1 tablet by mouth Daily., Disp: , Rfl:  "  •  Multiple Vitamins-Minerals (PRESERVISION AREDS 2 PO), Take 2 tablets by mouth Daily., Disp: , Rfl:   •  omeprazole (priLOSEC) 20 MG capsule, Take 20 mg by mouth Daily., Disp: , Rfl:   •  warfarin (COUMADIN) 2.5 MG tablet, Take 0.5 to 1 tablet by mouth daily or as directed by anticoagulation pharmacist (Patient taking differently: 2.3 mg. Take 0.5 to 1 tablet by mouth daily or as directed by anticoagulation pharmacist), Disp: 180 tablet, Rfl: 0  •  metoprolol tartrate (LOPRESSOR) 50 MG tablet, Take 1.5 tablets by mouth 2 (Two) Times a Day., Disp: 60 tablet, Rfl: 3      No Known Allergies  Patient Active Problem List   Diagnosis   • Atrial fibrillation [I48.91]   • Tachy-beatriz syndrome (CMS/HCC)   • Hypertension   • Hyperlipidemia   • Anemia   • Migraine   • Arthritis   • GERD (gastroesophageal reflux disease)   • Hypothyroidism   • Osteoporosis   • Chronic atrial fibrillation (CMS/HCC)     Past Medical History:   Diagnosis Date   • Anemia    • Arthritis    • Back ache    • GERD (gastroesophageal reflux disease)     /hernia hernia   • Hyperlipidemia    • Hypertension    • Hypothyroidism    • Migraine    • Osteoporosis    • Tachy-beatriz syndrome (CMS/HCC)      Past Surgical History:   Procedure Laterality Date   • BLADDER REPAIR     • EYE SURGERY      cataract surgey     No family history on file.  Social History     Tobacco Use   • Smoking status: Never Smoker   • Smokeless tobacco: Never Used   Substance Use Topics   • Alcohol use: No         PHYSICAL EXAM:    /68 (BP Location: Left arm, Patient Position: Sitting)   Pulse 90   Ht 152.4 cm (60\")   Wt 48 kg (105 lb 12.8 oz)   BMI 20.66 kg/m²        Wt Readings from Last 5 Encounters:   01/16/19 48 kg (105 lb 12.8 oz)   06/13/18 50.3 kg (111 lb)   06/07/17 55.2 kg (121 lb 12.8 oz)       BP Readings from Last 5 Encounters:   01/16/19 148/68   06/13/18 130/68   06/07/17 120/76       General appearance - Alert, well appearing, and in no distress   Mental " status - Affect appropriate to mood.  Eyes - Sclerae anicteric,  ENMT - Hearing grossly normal bilaterally, Dental hygiene good.  Neck - Carotids upstroke normal bilaterally, no bruits, no JVD.  Resp - Clear to auscultation, no wheezes, rales or rhonchi, symmetric air entry.  Heart - Normal rate, regular rhythm, normal S1, S2, no murmurs, rubs, clicks or gallops.  GI - Soft, nontender, nondistended, no masses or organomegaly.  Neurological - Grossly intact - normal speech, no focal findings  Musculoskeletal - No joint tenderness, deformity or swelling, no muscular tenderness noted.  Extremities - Peripheral pulses normal, no pedal edema, no clubbing or cyanosis.  Skin - Normal coloration and turgor.  Psych -  oriented to person, place, and time.    Medical problems and test results were reviewed with the patient today.     Recent Results (from the past 672 hour(s))   Protime-INR    Collection Time: 12/24/18 12:00 AM   Result Value Ref Range    INR 1.90    Protime-INR    Collection Time: 12/31/18 12:00 AM   Result Value Ref Range    INR 2.30    Protime-INR    Collection Time: 01/14/19 12:00 AM   Result Value Ref Range    INR 2.30          EKG: (EKG has been independently visualized by me and summarized below)    ECG 12 Lead  Date/Time: 1/16/2019 2:27 PM  Performed by: Josh Carmen PA  Authorized by: Josh Carmen PA   Comparison: not compared with previous ECG   Rhythm: atrial fibrillation  Rate: tachycardic  Conduction: right bundle branch block  ST Segments: ST segments normal  T Waves: T waves normal  QRS axis: normal  Other: no other findings  Clinical impression: dysrhythmia - atrial            Device Interrogation:  Single-chamber [employer] pacemaker.  RV paced 2%.  R-wave 5.9 mV.  RV threshold 0.5 V at 0.5 ms.  RV impedance 490 ohms.  Battery voltage is 2.5 years.    ASSESSMENT   1. Permanent Afib:  On BB.   2. HTN: OK control.   3. WILLIS/Palpitations: Titrate lopressor 75 mg BId.   4.  Anticoagulation:  Coumadin, Labile INR's last year now stable.     PLAN  · The patient still has some shortness breath with exertion which was treated with Lasix last visit with our office.  This did not seem to improve her symptoms.  She does seem to have palpitations that time when she is active.  We will try to titrate her Lopressor to 75 mg twice a day to see if this improves her symptoms.  She'll continue to monitor blood pressure which is checked quite frequently at home.  The patient and family wish for no invasive or further cardiac testing at this time.  · Return for follow-up in approximately 6 months or sooner with Dr. Jarrett     1/16/2019  2:24 PM    Will Nella GRULLON

## 2019-01-21 RX ORDER — FUROSEMIDE 20 MG/1
20 TABLET ORAL DAILY
Qty: 90 TABLET | Refills: 2 | Status: SHIPPED | OUTPATIENT
Start: 2019-01-21 | End: 2020-01-01 | Stop reason: ALTCHOICE

## 2019-01-22 ENCOUNTER — TELEPHONE (OUTPATIENT)
Dept: PHARMACY | Facility: HOSPITAL | Age: 84
End: 2019-01-22

## 2019-01-22 RX ORDER — HYDROCODONE BITARTRATE AND ACETAMINOPHEN 5; 325 MG/1; MG/1
0.5 TABLET ORAL EVERY EVENING
COMMUNITY
Start: 2019-01-18 | End: 2019-01-31

## 2019-01-22 NOTE — TELEPHONE ENCOUNTER
Patient's daughter, Mikayla, called report patient started taking Norco 5-325 0.5 tab QPM for back pain. Per Micromedex:     Warning: Concurrent use of ACETAMINOPHEN and WARFARIN may result in an increased risk of bleeding.    Clinical Management: Patients receiving warfarin or other coumarin anticoagulants should be cautioned to limit their intake of acetaminophen. Elevations in INR have occurred within 1-2 weeks of initiating acetaminophen at moderate to high doses (2 and 4 g/day) in patients on stable warfarin therapy (Doc et al, 2007). Consider early and frequent monitoring of INR for several weeks when acetaminophen is added or discontinued in patients on warfarin therapy (Adelaida et al, 2006).    Have discussed possible risks of increased bleeding and s/sx to be aware of. Daughter has removed OTC APAP from patient's house. She fills patient's 7-day med box with [place]/other RXs and takes them with her so patient has lowered risk of taking too much.      Additionally, daughter reports patient missed warfarin dose on 1/16. She still prefers to have POC INR drawn on Mon, 1/28, when the nurse comes by their house

## 2019-01-28 ENCOUNTER — ANTICOAGULATION VISIT (OUTPATIENT)
Dept: PHARMACY | Facility: HOSPITAL | Age: 84
End: 2019-01-28

## 2019-01-28 DIAGNOSIS — I48.20 CHRONIC ATRIAL FIBRILLATION (HCC): ICD-10-CM

## 2019-01-28 LAB — INR PPP: 2

## 2019-01-28 NOTE — PROGRESS NOTES
Anticoagulation Clinic - Remote Progress Note  ALERE HOME MONITOR  Frequency of Monitorin days    Indication: chronic afib  Referring Provider: Luiza  Initial Warfarin Start Date:   Goal INR: 2.0-3.0  Current Drug Interactions: Omeprazole, MVI, CoQ10, Levothyroxine  CHADS-VASc: 4 [HTN, Age>75, Female]  Clinic: 2018    Diet: Green beans, glenroy slaw once week, iceberg salad ~2x a week (19)  Alcohol: None  Tobacco: None  OTC Pain Medication: Tylenol  Med list updated 19    INR History:  Date 12/11 12/20 1/3/18 1/18 1/31 2/14 2/22 2/28 3/7 3/23 4/2 4/11 4/25   Total Weekly Dose 27.5 mg 27.5 mg 27.5  mg 27.5 mg   27.5mg 27.5 mg 25mg 27.5mg 27.5  mg 27.5 mg 27.5mg 27.5 mg 27.5  mg   INR 2.7 3.0 3.0 2.7 2.5 3.5 2.2 2.9 2.4 3.1 2.2 2.9 2.8   Notes       Held 1x    1 decr dose       Date 5/10 5/17 5/23 6/6 6/13 6/20 6/22 6/27 7/1 7/5   Total Weekly Dose 25mg 27.5mg 27.5mg 26.25mg 27.5mg 25mg 20mg 20mg 20mg 17.5mg   INR 1.9 2.3 2.4 3.1 3.8; 4.0 3.9 2.4 3.2 2.1 2.6   Notes missed 1x dose, self adj   Less GLV; daughter [place] clinic; Less GLV    More GLV; dose decr.      Date 7/11  7/18 7/25 8/1 8/7 8/15 8/22 9/5 9/10 9/17   Total Weekly Dose 17.5mg  18.75mg 18.75mg 20mg 22.5mg 20mg 20mg 20mg 22.5mg 22.5mg   INR 1.9 1.7 2.0 1.5 1.8 2.1 2.1 1.5 1.7 1.5   Notes     boost boost   boost      Date 9/24 10/1 10/15 10/22 10/29 11/5 11/19 11/26 12/10 12/24 12/31   Total Weekly Dose 23.75  mg 22.5mg 22.5mg 18.75mg 22.5mg 23.75mg 23.75mg 23.75mg 23.75mg 22.5mg 23.75mg   INR 2.1 2.1 2.7 1.8 1.9 2.1 2.4 2.5 2.2 1.9 2.3   Notes boost   missed 10/15 x 1  1x incr dose    Mis-dose      Date       Total Weekly Dose 23.75mg 23.75mg      INR 2.3 2.0      Notes  1 miss        Phone Interview:  Tablet Strength: 5mg tablet  Patient Contact Info: Mikayla Chaves Cell (daughter) [phone]    Patient Findings:  Positives:  Missed doses   Negatives:  Signs/symptoms of thrombosis, Signs/symptoms of bleeding, Laboratory test  error suspected, Change in health, Change in alcohol use, Change in activity, Upcoming invasive procedure, Emergency department visit, Upcoming dental procedure, Extra doses, Change in medications, Change in diet/appetite, Hospital admission, Bruising, Other complaints   Comments:  Spoke with patient's daughter, Mikayla, who states patient missed her dose on 1/16/19.     Plan:  1. INR is therapeutic today although the lower end of her INR range. Instructed patient's daughter, Mikayla, to have patient continue warfarin 3.75mg daily except 2.5mg on ThursSat.  2. Repeat INR in two weeks.  3. Verbal information provided over the phone. Adelaide Deutsch's daughter, Mikayla, RBV dosing instructions, expresses understanding by teach back, and has no further questions at this time.  4. Patient's daughter declines the need for warfarin refills at this time.  5. Med list verified with patient's daughter - please enter IVent.     Eleanor Patino, Pharmacy Technician  01/28/19  2:55 PM     Erinn SHIN [facility], have reviewed the note in full and agree with the assessment and plan. iVent entered.  01/29/19  8:04 AM

## 2019-02-11 ENCOUNTER — ANTICOAGULATION VISIT (OUTPATIENT)
Dept: PHARMACY | Facility: HOSPITAL | Age: 84
End: 2019-02-11

## 2019-02-11 DIAGNOSIS — I48.20 CHRONIC ATRIAL FIBRILLATION (HCC): ICD-10-CM

## 2019-02-11 LAB — INR PPP: 2.1

## 2019-02-20 ENCOUNTER — TELEPHONE (OUTPATIENT)
Dept: PHARMACY | Facility: HOSPITAL | Age: 84
End: 2019-02-20

## 2019-02-20 NOTE — TELEPHONE ENCOUNTER
Mrs. Deutsch's daughter reports that she has been started on Ibandronate 150mg once monthly and tramadol 50mg. Patient can take tramadol up to 3 times daily, but patients daughter is giving it to her once nightly. Discussed DDI with warfarin.     Will repeat INR on Friday (at patient's daughters earliest availability). Of note: nurse is scheduled to repeat her INR on Monday 2/25 as well.

## 2019-02-22 ENCOUNTER — ANTICOAGULATION VISIT (OUTPATIENT)
Dept: PHARMACY | Facility: HOSPITAL | Age: 84
End: 2019-02-22

## 2019-02-22 DIAGNOSIS — I48.20 CHRONIC ATRIAL FIBRILLATION (HCC): ICD-10-CM

## 2019-02-22 LAB — INR PPP: 2.4

## 2019-02-22 NOTE — PROGRESS NOTES
Anticoagulation Clinic - Remote Progress Note  ALERE HOME MONITOR  Frequency of Monitorin days    Indication: chronic afib  Referring Provider: Luiza  Initial Warfarin Start Date:   Goal INR: 2.0-3.0  Current Drug Interactions: Omeprazole, MVI, CoQ10, Levothyroxine  CHADS-VASc: 4 [HTN, Age>75, Female]  Clinic: 2018    Diet: Green beans, glenroy slaw once week, iceberg salad ~2x a week (19)  Alcohol: None  Tobacco: None  OTC Pain Medication: Tylenol  Med list updated 19    INR History:  Date 12/11 12/20 1/3/18 1/18 1/31 2/14 2/22 2/28 3/7 3/23 4/2 4/11 4/25   Total Weekly Dose 27.5 mg 27.5 mg 27.5  mg 27.5 mg   27.5mg 27.5 mg 25mg 27.5mg 27.5  mg 27.5 mg 27.5mg 27.5 mg 27.5  mg   INR 2.7 3.0 3.0 2.7 2.5 3.5 2.2 2.9 2.4 3.1 2.2 2.9 2.8   Notes       Held 1x    1 decr dose       Date 5/10 5/17 5/23 6/6 6/13 6/20 6/22 6/27 7/1 7/5   Total Weekly Dose 25mg 27.5mg 27.5mg 26.25mg 27.5mg 25mg 20mg 20mg 20mg 17.5mg   INR 1.9 2.3 2.4 3.1 3.8; 4.0 3.9 2.4 3.2 2.1 2.6   Notes missed 1x dose, self adj   Less GLV; daughter [place] clinic; Less GLV    More GLV; dose decr.      Date 7/11  7/18 7/25 8/1 8/7 8/15 8/22 9/5 9/10 9/17   Total Weekly Dose 17.5mg  18.75mg 18.75mg 20mg 22.5mg 20mg 20mg 20mg 22.5mg 22.5mg   INR 1.9 1.7 2.0 1.5 1.8 2.1 2.1 1.5 1.7 1.5   Notes     boost boost   boost      Date 9/24 10/1 10/15 10/22 10/29 11/5 11/19 11/26 12/10 12/24 12/31   Total Weekly Dose 23.75  mg 22.5mg 22.5mg 18.75mg 22.5mg 23.75mg 23.75mg 23.75mg 23.75mg 22.5mg 23.75mg   INR 2.1 2.1 2.7 1.8 1.9 2.1 2.4 2.5 2.2 1.9 2.3   Notes boost   missed 10/15 x 1  1x incr dose    Mis-dose      Date     Total Weekly Dose 23.75mg 23.75mg 23.75mg 23.75mg    INR 2.3 2.0 2.1 2.4    Notes  1 miss        Phone Interview:  Tablet Strength: 5mg tablet  Patient Contact Info: Mikayla Chaves Cell (daughter) [phone]    Patient Findings:  Positives:  Change in medications   Negatives:  Signs/symptoms of thrombosis,  Signs/symptoms of bleeding, Laboratory test error suspected, Change in health, Change in alcohol use, Change in activity, Upcoming invasive procedure, Emergency department visit, Upcoming dental procedure, Missed doses, Extra doses, Change in diet/appetite, Hospital admission, Bruising, Other complaints   Comments:  Patient's daughter states patient has started Ibandronate 150mg once monthly and tramadol 50mg. Patient can take tramadol up to 3 times daily, but patients daughter is giving it to her once nightly. If patient complains of pain during the day she patient's daughter plans on giving patient 1/2 tablet during the day but has not had to yet. She started taking the tramadol on Monday 2/18.        Plan:  1. INR is therapeutic todayat 2.4. Instructed patient's daughter, Mikayla, to have patient continue warfarin 3.75mg daily except 2.5mg on ThursSat.  2. Repeat INR in 1 week on 3/1 to ensure INR stays WNL.  3. Verbal information provided over the phone. Adelaide Deutsch's daughter, Mikayla, RBV dosing instructions, expresses understanding by teach back, and has no further questions at this time.      Leland Shen, PharmD Candidate 2019   02/22/19  10:48 AM       I, Loco Freedman, [facility], have reviewed the note in full and agree with the assessment and plan.  02/22/19  11:25 AM

## 2019-02-28 ENCOUNTER — ANTICOAGULATION VISIT (OUTPATIENT)
Dept: PHARMACY | Facility: HOSPITAL | Age: 84
End: 2019-02-28

## 2019-02-28 DIAGNOSIS — I48.20 CHRONIC ATRIAL FIBRILLATION (HCC): ICD-10-CM

## 2019-02-28 LAB — INR PPP: 2.8

## 2019-02-28 RX ORDER — TRAMADOL HYDROCHLORIDE 50 MG/1
25 TABLET ORAL
COMMUNITY
Start: 2019-02-18 | End: 2019-08-07

## 2019-02-28 RX ORDER — IBANDRONATE SODIUM 150 MG/1
150 TABLET, FILM COATED ORAL
COMMUNITY

## 2019-02-28 NOTE — PROGRESS NOTES
Anticoagulation Clinic - Remote Progress Note  [employer]  Frequency of Monitorin days    Indication: chronic afib  Referring Provider: Luiza  Initial Warfarin Start Date:   Goal INR: 2.0-3.0  Current Drug Interactions: Omeprazole, MVI, CoQ10, Levothyroxine  CHADS-VASc: 4 [HTN, Age>75, Female]  Clinic: 2018    Diet: Green beans, glenroy slaw once week, iceberg salad ~2x a week (19)  Alcohol: None  Tobacco: None  OTC Pain Medication: Tylenol  Med list updated 19    INR History:  Date 12/11 12/20 1/3/18 1/18 1/31 2/14 2/22 2/28 3/7 3/23 4/2 4/11 4/25   Total Weekly Dose 27.5 mg 27.5 mg 27.5  mg 27.5 mg   27.5mg 27.5 mg 25mg 27.5mg 27.5  mg 27.5 mg 27.5mg 27.5 mg 27.5  mg   INR 2.7 3.0 3.0 2.7 2.5 3.5 2.2 2.9 2.4 3.1 2.2 2.9 2.8   Notes       Held 1x    1 decr dose       Date 5/10 5/17 5/23 6/6 6/13 6/20 6/22 6/27 7/1 7/5   Total Weekly Dose 25mg 27.5mg 27.5mg 26.25mg 27.5mg 25mg 20mg 20mg 20mg 17.5mg   INR 1.9 2.3 2.4 3.1 3.8; 4.0 3.9 2.4 3.2 2.1 2.6   Notes missed 1x dose, self adj   Less GLV; daughter [place] [facility]; Less GLV    More GLV; dose decr.      Date 7/11  7/18 7/25 8/1 8/7 8/15 8/22 9/5 9/10 9/17   Total Weekly Dose 17.5mg  18.75mg 18.75mg 20mg 22.5mg 20mg 20mg 20mg 22.5mg 22.5mg   INR 1.9 1.7 2.0 1.5 1.8 2.1 2.1 1.5 1.7 1.5   Notes     boost boost   boost      Date 9/24 10/1 10/15 10/22 10/29 11/5 11/19 11/26 12/10 12/24 12/31   Total Weekly Dose 23.75  mg 22.5mg 22.5mg 18.75mg 22.5mg 23.75mg 23.75mg 23.75mg 23.75mg 22.5mg 23.75mg   INR 2.1 2.1 2.7 1.8 1.9 2.1 2.4 2.5 2.2 1.9 2.3   Notes boost   missed 10/15 x 1  1x incr dose    Mis-dose      Date 19         Total Weekly Dose 23.75mg 23.75mg 23.75mg 23.75mg  23.75 mg        INR 2.3 2.0 2.1 2.4  2.8        Notes  1 miss             Phone Interview:  Tablet Strength: 5mg tablet  Patient Contact Info: Mikayla Chaves, Cell (daughter) [phone]    Patient Findings   Negatives:  Signs/symptoms of thrombosis,  Signs/symptoms of bleeding, Laboratory test error suspected, Change in health, Change in alcohol use, Change in activity, Upcoming invasive procedure, Emergency department visit, Upcoming dental procedure, Missed doses, Extra doses, Change in medications, Change in diet/appetite, Hospital admission, Bruising, Other complaints   Comments:  Daughter reports patient continues to take one tramadol 50mg QPM     Plan:  1. INR is therapeutic today at 2.8. Instructed patient's daughter, Mikayla, to have patient continue warfarin 3.75mg oral daily except 2.5mg on ThursSat.  2. Repeat INR in 1 week on 3/7 as patient's INR has continued to trend upward in the past month  3. Verbal information provided over the phone. Adelaide Deutsch's daughter, Mikayla, RBV dosing instructions, expresses understanding by teach back, and has no further questions at this time.    Loco Valle, Kendall  2/28/2019  11:00 AM    I,Reena Moore, PharmD, have reviewed the note in full and agree with the assessment and plan.  (Confirm that Tramadol started on 2/18 at next call due to potential DDI and INR trending upward)  02/28/19  11:48 AM

## 2019-03-04 RX ORDER — METOPROLOL TARTRATE 50 MG/1
TABLET, FILM COATED ORAL
Qty: 240 TABLET | Refills: 6 | Status: SHIPPED | OUTPATIENT
Start: 2019-03-04

## 2019-03-07 ENCOUNTER — ANTICOAGULATION VISIT (OUTPATIENT)
Dept: PHARMACY | Facility: HOSPITAL | Age: 84
End: 2019-03-07

## 2019-03-07 DIAGNOSIS — I48.20 CHRONIC ATRIAL FIBRILLATION (HCC): ICD-10-CM

## 2019-03-07 LAB — INR PPP: 2.4

## 2019-03-07 NOTE — PROGRESS NOTES
Anticoagulation Clinic - Remote Progress Note  [employer]  Frequency of Monitorin days    Indication: chronic afib  Referring Provider: Luiza  Initial Warfarin Start Date:   Goal INR: 2.0-3.0  Current Drug Interactions: Omeprazole, MVI, CoQ10, Levothyroxine  CHADS-VASc: 4 [HTN, Age>75, Female]  Clinic: 2018    Diet: Green beans, glenroy slaw once week, iceberg salad ~2x a week (19)  Alcohol: None  Tobacco: None  OTC Pain Medication: Tylenol  Med list updated 19    INR History:  Date 12/11 12/20 1/3/18 1/18 1/31 2/14 2/22 2/28 3/7 3/23 4/2 4/11 4/25   Total Weekly Dose 27.5 mg 27.5 mg 27.5  mg 27.5 mg   27.5mg 27.5 mg 25mg 27.5mg 27.5  mg 27.5 mg 27.5mg 27.5 mg 27.5  mg   INR 2.7 3.0 3.0 2.7 2.5 3.5 2.2 2.9 2.4 3.1 2.2 2.9 2.8   Notes       Held 1x    1 decr dose       Date 5/10 5/17 5/23 6/6 6/13 6/20 6/22 6/27 7/1 7/5   Total Weekly Dose 25mg 27.5mg 27.5mg 26.25mg 27.5mg 25mg 20mg 20mg 20mg 17.5mg   INR 1.9 2.3 2.4 3.1 3.8; 4.0 3.9 2.4 3.2 2.1 2.6   Notes missed 1x dose, self adj   Less GLV; daughter [place] [facility]; Less GLV    More GLV; dose decr.      Date 7/11  7/18 7/25 8/1 8/7 8/15 8/22 9/5 9/10 9/17   Total Weekly Dose 17.5mg  18.75mg 18.75mg 20mg 22.5mg 20mg 20mg 20mg 22.5mg 22.5mg   INR 1.9 1.7 2.0 1.5 1.8 2.1 2.1 1.5 1.7 1.5   Notes     boost boost   boost      Date 9/24 10/1 10/15 10/22 10/29 11/5 11/19 11/26 12/10 12/24 12/31   Total Weekly Dose 23.75  mg 22.5mg 22.5mg 18.75mg 22.5mg 23.75mg 23.75mg 23.75mg 23.75mg 22.5mg 23.75mg   INR 2.1 2.1 2.7 1.8 1.9 2.1 2.4 2.5 2.2 1.9 2.3   Notes boost   missed 10/15 x 1  1x incr dose    Mis-dose      Date 1/14/19 1/28 2/11 2/22 2/28 3/7         Total Weekly Dose 23.75  mg 23.75  mg 23.75  mg 23.75  mg 23.75  mg 23.75  mg        INR 2.3 2.0 2.1 2.4 2.8 2.4        Notes  1 miss              Phone Interview:  Tablet Strength: 5mg tablet  Patient Contact Info: Mikayla Chaves, Cell (daughter) [phone]    Patient Findings   Positives:  Change  in medications   Negatives:  Signs/symptoms of thrombosis, Signs/symptoms of bleeding, Laboratory test error suspected, Change in health, Change in alcohol use, Change in activity, Upcoming invasive procedure, Emergency department visit, Upcoming dental procedure, Missed doses, Extra doses, Change in diet/appetite, Hospital admission, Bruising, Other complaints   Comments:  Daughter reports patient continue to take tramadol 50mg QPM and additional reports patient has been taking one APAP 650mg in the afternoon for arthritis.     Patient has eye injection on 3/14, was advised not to hold warfarin     Plan:  1. INR is therapeutic today. Instructed patient's daughter, Mikayla, to have patient continue warfarin 3.75mg daily except 2.5mg on ThursSat.  2. Repeat INR in 2 weeks  3. Verbal information provided over the phone. Adelaide Deutsch's daughter, Mikayla, RBV dosing instructions, expresses understanding by teach back, and has no further questions at this time.    Loco Valle, Kendall  3/7/2019  10:05 AM    I, [facility], have reviewed the note in full and agree with the assessment and plan.  03/07/19  10:44 AM

## 2019-03-20 ENCOUNTER — ANTICOAGULATION VISIT (OUTPATIENT)
Dept: PHARMACY | Facility: HOSPITAL | Age: 84
End: 2019-03-20

## 2019-03-20 DIAGNOSIS — I48.20 CHRONIC ATRIAL FIBRILLATION (HCC): ICD-10-CM

## 2019-03-20 LAB — INR PPP: 2.3

## 2019-03-20 NOTE — PROGRESS NOTES
Anticoagulation Clinic - Remote Progress Note  [employer]  Frequency of Monitorin days    Indication: chronic afib  Referring Provider: Luiza  Initial Warfarin Start Date:   Goal INR: 2.0-3.0  Current Drug Interactions: Omeprazole, MVI, CoQ10, Levothyroxine  CHADS-VASc: 4 [HTN, Age>75, Female]  Clinic: 2018    Diet: Green beans, glenroy slaw once week, iceberg salad ~2x a week (19)  Alcohol: None  Tobacco: None  OTC Pain Medication: Tylenol  Med list updated 19    INR History:  Date 12/11 12/20 1/3/18 1/18 1/31 2/14 2/22 2/28 3/7 3/23 4/2 4/11 4/25   Total Weekly Dose 27.5 mg 27.5 mg 27.5  mg 27.5 mg   27.5mg 27.5 mg 25mg 27.5mg 27.5  mg 27.5 mg 27.5mg 27.5 mg 27.5  mg   INR 2.7 3.0 3.0 2.7 2.5 3.5 2.2 2.9 2.4 3.1 2.2 2.9 2.8   Notes       Held 1x    1 decr dose       Date 5/10 5/17 5/23 6/6 6/13 6/20 6/22 6/27 7/1 7/5   Total Weekly Dose 25mg 27.5mg 27.5mg 26.25mg 27.5mg 25mg 20mg 20mg 20mg 17.5mg   INR 1.9 2.3 2.4 3.1 3.8; 4.0 3.9 2.4 3.2 2.1 2.6   Notes missed 1x dose, self adj   Less GLV; daughter [place] [facility]; Less GLV    More GLV; dose decr.      Date 7/11  7/18 7/25 8/1 8/7 8/15 8/22 9/5 9/10 9/17   Total Weekly Dose 17.5mg  18.75mg 18.75mg 20mg 22.5mg 20mg 20mg 20mg 22.5mg 22.5mg   INR 1.9 1.7 2.0 1.5 1.8 2.1 2.1 1.5 1.7 1.5   Notes     boost boost   boost      Date 9/24 10/1 10/15 10/22 10/29 11/5 11/19 11/26 12/10 12/24 12/31   Total Weekly Dose 23.75  mg 22.5mg 22.5mg 18.75mg 22.5mg 23.75mg 23.75mg 23.75mg 23.75mg 22.5mg 23.75mg   INR 2.1 2.1 2.7 1.8 1.9 2.1 2.4 2.5 2.2 1.9 2.3   Notes boost   missed 10/15 x 1  1x incr dose    Mis-dose      Date 1/14/19 1/28 2/11 2/22 2/28 3/7 3/20        Total Weekly Dose 23.75  mg 23.75  mg 23.75  mg 23.75  mg 23.75  mg 23.75  mg 23.75mg       INR 2.3 2.0 2.1 2.4 2.8 2.4 2.3       Notes  1 miss     fall         Phone Interview:  Tablet Strength: 5mg tablet  Patient Contact Info: Mikayla Chaves, Cell (daughter) [phone]    Patient  Findings:  Positives:  Emergency department visit, Missed doses, Change in medications   Negatives:  Signs/symptoms of thrombosis, Signs/symptoms of bleeding, Laboratory test error suspected, Change in health, Change in alcohol use, Change in activity, Upcoming invasive procedure, Upcoming dental procedure, Extra doses, Change in diet/appetite, Hospital admission, Bruising, Other complaints   Comments:  Patient had a fall a couple of days ago in which she hit her face, head and arm. Went to the emergency room yesterday and was cleared by physician. She has cut back Tramadol to one-half tablet (25mg) at bedtime only. She will also be starting a topical pain relief cream sometime in the next week. She is due to have an injection in her eye on Thursday.     Plan:  1. INR is therapeutic today (called in verbally by Mikayla). Instructed patient's daughter, Mikayla, to have patient continue warfarin 3.75mg daily except 2.5mg on ThursSat.  2. Repeat INR in one week.  3. Verbal information provided over the phone. Adelaide Deutsch's daughter, Mikayla, RBV dosing instructions, expresses understanding by teach back, and has no further questions at this time.    Eleanor Patino CPhT  3/20/2019  3:27 PM    IBo, [facility], have reviewed the note in full and agree with the assessment and plan.  03/20/19  4:10 PM

## 2019-03-27 ENCOUNTER — ANTICOAGULATION VISIT (OUTPATIENT)
Dept: PHARMACY | Facility: HOSPITAL | Age: 84
End: 2019-03-27

## 2019-03-27 DIAGNOSIS — I48.20 CHRONIC ATRIAL FIBRILLATION (HCC): ICD-10-CM

## 2019-03-27 LAB — INR PPP: 2.8

## 2019-03-27 NOTE — PROGRESS NOTES
Anticoagulation Clinic - Remote Progress Note  [employer]  Frequency of Monitorin days    Indication: chronic afib  Referring Provider: Luiza  Initial Warfarin Start Date:   Goal INR: 2.0-3.0  Current Drug Interactions: Omeprazole, MVI, CoQ10, Levothyroxine  CHADS-VASc: 4 [HTN, Age>75, Female]  Clinic: 2018    Diet: Green beans, glenroy slaw once week, iceberg salad ~2x a week (19)  Alcohol: None  Tobacco: None  OTC Pain Medication: Tylenol  Med list updated 19    INR History:  Date 12/11 12/20 1/3/18 1/18 1/31 2/14 2/22 2/28 3/7 3/23 4/2 4/11 4/25   Total Weekly Dose 27.5 mg 27.5 mg 27.5  mg 27.5 mg   27.5mg 27.5 mg 25mg 27.5mg 27.5  mg 27.5 mg 27.5mg 27.5 mg 27.5  mg   INR 2.7 3.0 3.0 2.7 2.5 3.5 2.2 2.9 2.4 3.1 2.2 2.9 2.8   Notes       Held 1x    1 decr dose       Date 5/10 5/17 5/23 6/6 6/13 6/20 6/22 6/27 7/1 7/5   Total Weekly Dose 25mg 27.5mg 27.5mg 26.25mg 27.5mg 25mg 20mg 20mg 20mg 17.5mg   INR 1.9 2.3 2.4 3.1 3.8; 4.0 3.9 2.4 3.2 2.1 2.6   Notes missed 1x dose, self adj   Less GLV; daughter [place] [facility]; Less GLV    More GLV; dose decr.      Date 7/11  7/18 7/25 8/1 8/7 8/15 8/22 9/5 9/10 9/17   Total Weekly Dose 17.5mg  18.75mg 18.75mg 20mg 22.5mg 20mg 20mg 20mg 22.5mg 22.5mg   INR 1.9 1.7 2.0 1.5 1.8 2.1 2.1 1.5 1.7 1.5   Notes     boost boost   boost      Date 9/24 10/1 10/15 10/22 10/29 11/5 11/19 11/26 12/10 12/24 12/31   Total Weekly Dose 23.75  mg 22.5mg 22.5mg 18.75mg 22.5mg 23.75mg 23.75mg 23.75mg 23.75mg 22.5mg 23.75mg   INR 2.1 2.1 2.7 1.8 1.9 2.1 2.4 2.5 2.2 1.9 2.3   Notes boost   missed 10/15 x 1  1x incr dose    Mis-dose      Date 1/14/19 1/28 2/11 2/22 2/28 3/7 3/20 3/27       Total Weekly Dose 23.75  mg 23.75  mg 23.75  mg 23.75  mg 23.75  mg 23.75  mg 23.75mg 23.75      INR 2.3 2.0 2.1 2.4 2.8 2.4 2.3 2.8      Notes  1 miss     fall         Phone Interview:  Tablet Strength: 5mg tablet  Patient Contact Info: Mikayla Chaves, Cell (daughter) [phone]    Patient  Findings:  Negatives:  Signs/symptoms of thrombosis, Signs/symptoms of bleeding, Laboratory test error suspected, Change in health, Change in alcohol use, Change in activity, Upcoming invasive procedure, Emergency department visit, Upcoming dental procedure, Missed doses, Extra doses, Change in medications, Change in diet/appetite, Hospital admission, Bruising, Other complaints   Comments:  Started a compounded cream that's rubbed on back. Has diclofenac, petin(?), lidocaine, prilocaine- applied three times daily. Will be getting injection in eye next Thursday. No need to stop warfarin per eye doctor.       Plan:  1. INR is again therapeutic at 2.8. Instructed Mikayla to continue with same regimen of 3.75mg daily except 2.5mg Thurs/Sat.  2. Repeat INR in 2 weeks (increased from qweek per patient request).   3. Verbal information provided over the phone. Adelaide Deutsch's daughter, Mikayla, RBV dosing instructions, expresses understanding by teach back, and has no further questions at this time.    Thanks,  Bo Hughes, PharmD  Pharmacy Resident  3/27/2019  4:55 PM

## 2019-04-10 ENCOUNTER — ANTICOAGULATION VISIT (OUTPATIENT)
Dept: PHARMACY | Facility: HOSPITAL | Age: 84
End: 2019-04-10

## 2019-04-10 DIAGNOSIS — I48.20 CHRONIC ATRIAL FIBRILLATION (HCC): ICD-10-CM

## 2019-04-10 LAB — INR PPP: 2.9

## 2019-04-10 NOTE — PROGRESS NOTES
Anticoagulation Clinic - Remote Progress Note  [employer]  Frequency of Monitorin days    Indication: chronic afib  Referring Provider: Luiza  Initial Warfarin Start Date:   Goal INR: 2.0-3.0  Current Drug Interactions: Omeprazole, MVI, CoQ10, Levothyroxine  CHADS-VASc: 4 [HTN, Age>75, Female]  Clinic: 2018    Diet: Green beans, glenroy slaw once week, iceberg salad ~2x a week (19)  Alcohol: None  Tobacco: None  OTC Pain Medication: Tylenol    INR History:  Date 12/11 12/20 1/3/18 1/18 1/31 2/14 2/22 2/28 3/7 3/23 4/2 4/11 4/25   Total Weekly Dose 27.5 mg 27.5 mg 27.5  mg 27.5 mg   27.5mg 27.5 mg 25mg 27.5mg 27.5  mg 27.5 mg 27.5mg 27.5 mg 27.5  mg   INR 2.7 3.0 3.0 2.7 2.5 3.5 2.2 2.9 2.4 3.1 2.2 2.9 2.8   Notes       Held 1x    1 decr dose       Date 5/10 5/17 5/23 6/6 6/13 6/20 6/22 6/27 7/1 7/5   Total Weekly Dose 25mg 27.5mg 27.5mg 26.25mg 27.5mg 25mg 20mg 20mg 20mg 17.5mg   INR 1.9 2.3 2.4 3.1 3.8; 4.0 3.9 2.4 3.2 2.1 2.6   Notes missed 1x dose, self adj   Less GLV; daughter [place] clinic; Less GLV    More GLV; dose decr.      Date 7/11  7/18 7/25 8/1 8/7 8/15 8/22 9/5 9/10 9/17   Total Weekly Dose 17.5mg  18.75mg 18.75mg 20mg 22.5mg 20mg 20mg 20mg 22.5mg 22.5mg   INR 1.9 1.7 2.0 1.5 1.8 2.1 2.1 1.5 1.7 1.5   Notes     boost boost   boost      Date 9/24 10/1 10/15 10/22 10/29 11/5 11/19 11/26 12/10 12/24 12/31   Total Weekly Dose 23.75  mg 22.5mg 22.5mg 18.75mg 22.5mg 23.75mg 23.75mg 23.75mg 23.75mg 22.5mg 23.75mg   INR 2.1 2.1 2.7 1.8 1.9 2.1 2.4 2.5 2.2 1.9 2.3   Notes boost   missed 10/15 x 1  1x incr dose    Mis-dose      Date 1/14/19 1/28 2/11 2/22 2/28 3/7 3/20 3/27 4/10      Total Weekly Dose 23.75  mg 23.75  mg 23.75  mg 23.75  mg 23.75  mg 23.75  mg 23.75  mg 23.75  mg 23.75  mg 23.75mg    INR 2.3 2.0 2.1 2.4 2.8 2.4 2.3 2.8 2.9     Notes  1 miss     fall         Phone Interview:  Tablet Strength: 5mg tablet  Patient Contact Info: Mikayla Chaves, Cell (daughter)  [phone]    Patient Findings   Negatives:  Signs/symptoms of thrombosis, Signs/symptoms of bleeding, Laboratory test error suspected, Change in health, Change in alcohol use, Change in activity, Upcoming invasive procedure, Emergency department visit, Upcoming dental procedure, Missed doses, Extra doses, Change in medications, Change in diet/appetite, Hospital admission, Bruising, Other complaints     Plan:  1. INR is therapeutic today at 2.9. Instructed patient's daughter, Mikayla, to have patient continue warfarin 3.75mg daily except 2.5mg ThursSat.  2. Repeat INR in 2 weeks.  3. Verbal information provided over the phone. Adelaide Deutsch's daughter, Mikayla, RBV dosing instructions, expresses understanding by teach back, and has no further questions at this time.    Loco Valle, CHRIS  4/10/2019  12:01 PM    I, Eugenio Taveras, [facility], have reviewed the note in full and agree with the assessment and plan.  04/10/19  2:02 PM

## 2019-04-24 ENCOUNTER — ANTICOAGULATION VISIT (OUTPATIENT)
Dept: PHARMACY | Facility: HOSPITAL | Age: 84
End: 2019-04-24

## 2019-04-24 DIAGNOSIS — I48.20 CHRONIC ATRIAL FIBRILLATION (HCC): ICD-10-CM

## 2019-04-24 LAB — INR PPP: 1.8

## 2019-04-24 NOTE — PROGRESS NOTES
Anticoagulation Clinic - Remote Progress Note  [employer]  Frequency of Monitorin days    Indication: chronic afib  Referring Provider: Luiza  Initial Warfarin Start Date:   Goal INR: 2.0-3.0  Current Drug Interactions: Omeprazole, MVI, CoQ10, Levothyroxine  CHADS-VASc: 4 [HTN, Age>75, Female]  Clinic: 2018    Diet: Green beans, glenroy slaw once week, iceberg salad ~2x a week (19)  Alcohol: None  Tobacco: None  OTC Pain Medication: Tylenol    INR History:  Date 12/11 12/20 1/3/18 1/18 1/31 2/14 2/22 2/28 3/7 3/23 4/2 4/11 4/25   Total Weekly Dose 27.5 mg 27.5 mg 27.5  mg 27.5 mg   27.5mg 27.5 mg 25mg 27.5mg 27.5  mg 27.5 mg 27.5mg 27.5 mg 27.5  mg   INR 2.7 3.0 3.0 2.7 2.5 3.5 2.2 2.9 2.4 3.1 2.2 2.9 2.8   Notes       Held 1x    1 decr dose       Date 5/10 5/17 5/23 6/6 6/13 6/20 6/22 6/27 7/1 7/5   Total Weekly Dose 25mg 27.5mg 27.5mg 26.25mg 27.5mg 25mg 20mg 20mg 20mg 17.5mg   INR 1.9 2.3 2.4 3.1 3.8; 4.0 3.9 2.4 3.2 2.1 2.6   Notes missed 1x dose, self adj   Less GLV; daughter [place] clinic; Less GLV    More GLV; dose decr.      Date 7/11  7/18 7/25 8/1 8/7 8/15 8/22 9/5 9/10 9/17   Total Weekly Dose 17.5mg  18.75mg 18.75mg 20mg 22.5mg 20mg 20mg 20mg 22.5mg 22.5mg   INR 1.9 1.7 2.0 1.5 1.8 2.1 2.1 1.5 1.7 1.5   Notes     boost boost   boost      Date 9/24 10/1 10/15 10/22 10/29 11/5 11/19 11/26 12/10 12/24 12/31   Total Weekly Dose 23.75  mg 22.5mg 22.5mg 18.75mg 22.5mg 23.75mg 23.75mg 23.75mg 23.75mg 22.5mg 23.75mg   INR 2.1 2.1 2.7 1.8 1.9 2.1 2.4 2.5 2.2 1.9 2.3   Notes boost   missed 10/15 x 1  1x incr dose    Mis-dose      Date 1/14/19 1/28 2/11 2/22 2/28 3/7 3/20 3/27 4/10  4/24    Total Weekly Dose 23.75  mg 23.75  mg 23.75  mg 23.75  mg 23.75  mg 23.75  mg 23.75  mg 23.75  mg 23.75  mg 23.75mg    INR 2.3 2.0 2.1 2.4 2.8 2.4 2.3 2.8 2.9 1.8    Notes  1 miss     fall         Phone Interview:  Tablet Strength: 5mg tablet  Patient Contact Info: Mikayla Chaves, Cell (daughter)  [phone]    Patient Findings   Positives:  Change in diet/appetite   Negatives:  Signs/symptoms of thrombosis, Signs/symptoms of bleeding, Laboratory test error suspected, Change in health, Change in alcohol use, Change in activity, Upcoming invasive procedure, Emergency department visit, Upcoming dental procedure, Missed doses, Extra doses, Change in medications, Hospital admission, Bruising, Other complaints   Comments:  Patient's daughter reports she had extra GLV this weekend (salad, green beans, and coleslaw). She denies falls, however, she does have some bruising that is not out of the ordinary.     Plan:  1. INR is slightly SUBtherapeutic today. Given age, recent falls, and extra GLV instructed patient to continue warfarin 3.75mg daily except 2.5mg ThursSat. Anticipate her INR will return w/in normal limits.  2. Repeat INR in one week to ensure WNL.  3. Verbal information provided over the phone. Adelaide Deutsch's daughter, Mikayla, RBV dosing instructions, expresses understanding by teach back, and has no further questions at this time.    Rhonda Singh, PharmD  4/24/2019  8:11 AM

## 2019-05-01 ENCOUNTER — ANTICOAGULATION VISIT (OUTPATIENT)
Dept: PHARMACY | Facility: HOSPITAL | Age: 84
End: 2019-05-01

## 2019-05-01 DIAGNOSIS — I48.20 CHRONIC ATRIAL FIBRILLATION (HCC): ICD-10-CM

## 2019-05-01 LAB — INR PPP: 1.9

## 2019-05-01 NOTE — PROGRESS NOTES
Anticoagulation Clinic - Remote Progress Note  [employer]  Frequency of Monitorin days    Indication: chronic afib  Referring Provider: Luiza  Initial Warfarin Start Date:   Goal INR: 2.0-3.0  Current Drug Interactions: Omeprazole, MVI, CoQ10, Levothyroxine  CHADS-VASc: 4 [HTN, Age>75, Female]  Clinic: 2018    Diet: Green beans, glenroy slaw once week, iceberg salad ~2x a week (19)  Alcohol: None  Tobacco: None  OTC Pain Medication: Tylenol    INR History:  Date 12/11 12/20 1/3/18 1/18 1/31 2/14 2/22 2/28 3/7 3/23 4/2 4/11 4/25   Total Weekly Dose 27.5 mg 27.5 mg 27.5  mg 27.5 mg   27.5mg 27.5 mg 25mg 27.5mg 27.5  mg 27.5 mg 27.5mg 27.5 mg 27.5  mg   INR 2.7 3.0 3.0 2.7 2.5 3.5 2.2 2.9 2.4 3.1 2.2 2.9 2.8   Notes       Held 1x    1 decr dose       Date 5/10 5/17 5/23 6/6 6/13 6/20 6/22 6/27 7/1 7/5   Total Weekly Dose 25mg 27.5mg 27.5mg 26.25mg 27.5mg 25mg 20mg 20mg 20mg 17.5mg   INR 1.9 2.3 2.4 3.1 3.8; 4.0 3.9 2.4 3.2 2.1 2.6   Notes missed 1x dose, self adj   Less GLV; daughter [place] clinic; Less GLV    More GLV; dose decr.      Date 7/11  7/18 7/25 8/1 8/7 8/15 8/22 9/5 9/10 9/17   Total Weekly Dose 17.5mg  18.75mg 18.75mg 20mg 22.5mg 20mg 20mg 20mg 22.5mg 22.5mg   INR 1.9 1.7 2.0 1.5 1.8 2.1 2.1 1.5 1.7 1.5   Notes     boost boost   boost      Date 9/24 10/1 10/15 10/22 10/29 11/5 11/19 11/26 12/10 12/24 12/31   Total Weekly Dose 23.75  mg 22.5mg 22.5mg 18.75mg 22.5mg 23.75mg 23.75mg 23.75mg 23.75mg 22.5mg 23.75mg   INR 2.1 2.1 2.7 1.8 1.9 2.1 2.4 2.5 2.2 1.9 2.3   Notes boost   missed 10/15 x 1  1x incr dose    Mis-dose      Date 1/14/19 1/28 2/11 2/22 2/28 3/7 3/20 3/27 4/10  4/24  5/1   Total Weekly Dose 23.75  mg 23.75  mg 23.75  mg 23.75  mg 23.75  mg 23.75  mg 23.75  mg 23.75  mg 23.75  mg 23.75mg  23.75 mg   INR 2.3 2.0 2.1 2.4 2.8 2.4 2.3 2.8 2.9 1.8  1.9   Notes  1 miss     fall         Phone Interview:  Tablet Strength: 5mg tablet  Patient Contact Info: Mikayla Chaves, Cell  (daughter) [phone]        Plan:  1. INR is slightly SUBtherapeutic today 1.9. Given age, recent falls, and extra GLV instructed patient to take warfarin 3.75mg daily except 2.5mg Sat this week. She should be able to resume her usual maintenance dose of warfarin 3.75 mg daily except 2.5 mg ThursSat next week.  2. Repeat INR in one week on 5/8 to ensure WNL.  3. Verbal information provided over the phone. Adelaide Deutsch's daughter, Mikayla, RBV dosing instructions, expresses understanding by teach back, and has no further questions at this time.    Reena Moore, PharmD  5/1/2019  7:57 AM

## 2019-05-08 ENCOUNTER — ANTICOAGULATION VISIT (OUTPATIENT)
Dept: PHARMACY | Facility: HOSPITAL | Age: 84
End: 2019-05-08

## 2019-05-08 DIAGNOSIS — I48.91 ATRIAL FIBRILLATION, UNSPECIFIED TYPE (HCC): ICD-10-CM

## 2019-05-08 DIAGNOSIS — I48.20 CHRONIC ATRIAL FIBRILLATION (HCC): ICD-10-CM

## 2019-05-08 LAB — INR PPP: 2.2

## 2019-05-08 RX ORDER — WARFARIN SODIUM 2.5 MG/1
TABLET ORAL
Qty: 180 TABLET | Refills: 0 | Status: SHIPPED | OUTPATIENT
Start: 2019-05-08

## 2019-05-08 NOTE — PROGRESS NOTES
Anticoagulation Clinic - Remote Progress Note  [facility]  Frequency of Monitorin days    Indication: chronic afib  Referring Provider: Luiza  Initial Warfarin Start Date:   Goal INR: 2.0-3.0  Current Drug Interactions: Omeprazole, MVI, CoQ10, Levothyroxine  CHADS-VASc: 4 [HTN, Age>75, Female]  Clinic: 2018    Diet: Green beans, glenroy slaw once week, iceberg salad ~2x a week (19)  Alcohol: None  Tobacco: None  OTC Pain Medication: Tylenol    INR History:  Date 12/11 12/20 1/3/18 1/18 1/31 2/14 2/22 2/28 3/7 3/23 4/2 4/11 4/25   Total Weekly Dose 27.5 mg 27.5 mg 27.5  mg 27.5 mg   27.5mg 27.5 mg 25mg 27.5mg 27.5  mg 27.5 mg 27.5mg 27.5 mg 27.5  mg   INR 2.7 3.0 3.0 2.7 2.5 3.5 2.2 2.9 2.4 3.1 2.2 2.9 2.8   Notes       Held 1x    1 decr dose       Date 5/10 5/17 5/23 6/6 6/13 6/20 6/22 6/27 7/1 7/5   Total Weekly Dose 25mg 27.5mg 27.5mg 26.25mg 27.5mg 25mg 20mg 20mg 20mg 17.5mg   INR 1.9 2.3 2.4 3.1 3.8; 4.0 3.9 2.4 3.2 2.1 2.6   Notes missed 1x dose, self adj   Less GLV; daughter [place] clinic; Less GLV    More GLV; dose decr.      Date 7/11  7/18 7/25 8/1 8/7 8/15 8/22 9/5 9/10 9/17   Total Weekly Dose 17.5mg  18.75mg 18.75mg 20mg 22.5mg 20mg 20mg 20mg 22.5mg 22.5mg   INR 1.9 1.7 2.0 1.5 1.8 2.1 2.1 1.5 1.7 1.5   Notes     boost boost   boost      Date 9/24 10/1 10/15 10/22 10/29 11/5 11/19 11/26 12/10 12/24 12/31   Total Weekly Dose 23.75  mg 22.5mg 22.5mg 18.75mg 22.5mg 23.75mg 23.75mg 23.75mg 23.75mg 22.5mg 23.75mg   INR 2.1 2.1 2.7 1.8 1.9 2.1 2.4 2.5 2.2 1.9 2.3   Notes boost   missed 10/15 x 1  1x incr dose    Mis-dose      Date 1/14/19 1/28 2/11 2/22 2/28 3/7 3/20 3/27 4/10  4/24  5/1   Total Weekly Dose 23.75  mg 23.75  mg 23.75  mg 23.75  mg 23.75  mg 23.75  mg 23.75  mg 23.75  mg 23.75  mg 23.75mg  23.75 mg   INR 2.3 2.0 2.1 2.4 2.8 2.4 2.3 2.8 2.9 1.8  1.9   Notes  1 miss     fall         Date        Total Weekly Dose 25mg       INR 2.2       Notes          Phone  Interview:  Tablet Strength: 5mg tablet  Patient Contact Info: Mikayla Chaves, Cell (daughter) [phone]    Patient Findings:  Negatives:  Signs/symptoms of thrombosis, Signs/symptoms of bleeding, Laboratory test error suspected, Change in health, Change in alcohol use, Change in activity, Upcoming invasive procedure, Emergency department visit, Upcoming dental procedure, Missed doses, Extra doses, Change in medications, Change in diet/appetite, Hospital admission, Bruising, Other complaints     Plan:  1. INR is back WNL today. Instructed patient to resume her usual maintenance dose of warfarin 3.75mg daily except 2.5 mg ThursSat.  2. Repeat INR in one week.  3. Verbal information provided over the phone. Adelaide Deutsch's daughter, Mikayla, RBV dosing instructions, expresses understanding by teach back, and has no further questions at this time.  4. Patient requests refills of warfarin 2.5mg be sent to [facility] mail order. Separate refill encounter created.    Eleanor Patino CPhT  5/8/2019  9:34 AM    Refilled Rx.  I, Rhonda Singh, PharmD, have reviewed the note in full and agree with the assessment and plan.  05/08/19  4:57 PM

## 2019-05-15 ENCOUNTER — ANTICOAGULATION VISIT (OUTPATIENT)
Dept: PHARMACY | Facility: HOSPITAL | Age: 84
End: 2019-05-15

## 2019-05-15 DIAGNOSIS — I48.20 CHRONIC ATRIAL FIBRILLATION (HCC): ICD-10-CM

## 2019-05-15 LAB — INR PPP: 2.6

## 2019-05-15 NOTE — PROGRESS NOTES
Anticoagulation Clinic - Remote Progress Note  [facility]  Frequency of Monitorin days    Indication: chronic afib  Referring Provider: Luiza  Initial Warfarin Start Date:   Goal INR: 2.0-3.0  Current Drug Interactions: Omeprazole, MVI, CoQ10, Levothyroxine  CHADS-VASc: 4 [HTN, Age>75, Female]  Clinic: 2018    Diet: Green beans, glenroy slaw once week, iceberg salad ~2x a week (19)  Alcohol: None  Tobacco: None  OTC Pain Medication: Tylenol    INR History:  Date 12/11 12/20 1/3/18 1/18 1/31 2/14 2/22 2/28 3/7 3/23 4/2 4/11 4/25   Total Weekly Dose 27.5 mg 27.5 mg 27.5  mg 27.5 mg   27.5mg 27.5 mg 25mg 27.5mg 27.5  mg 27.5 mg 27.5mg 27.5 mg 27.5  mg   INR 2.7 3.0 3.0 2.7 2.5 3.5 2.2 2.9 2.4 3.1 2.2 2.9 2.8   Notes       Held 1x    1 decr dose       Date 5/10 5/17 5/23 6/6 6/13 6/20 6/22 6/27 7/1 7/5   Total Weekly Dose 25mg 27.5mg 27.5mg 26.25mg 27.5mg 25mg 20mg 20mg 20mg 17.5mg   INR 1.9 2.3 2.4 3.1 3.8; 4.0 3.9 2.4 3.2 2.1 2.6   Notes missed 1x dose, self adj   Less GLV; daughter [place] clinic; Less GLV    More GLV; dose decr.      Date 7/11  7/18 7/25 8/1 8/7 8/15 8/22 9/5 9/10 9/17   Total Weekly Dose 17.5mg  18.75mg 18.75mg 20mg 22.5mg 20mg 20mg 20mg 22.5mg 22.5mg   INR 1.9 1.7 2.0 1.5 1.8 2.1 2.1 1.5 1.7 1.5   Notes     boost boost   boost      Date 9/24 10/1 10/15 10/22 10/29 11/5 11/19 11/26 12/10 12/24 12/31   Total Weekly Dose 23.75  mg 22.5mg 22.5mg 18.75mg 22.5mg 23.75mg 23.75mg 23.75mg 23.75mg 22.5mg 23.75mg   INR 2.1 2.1 2.7 1.8 1.9 2.1 2.4 2.5 2.2 1.9 2.3   Notes boost   missed 10/15 x 1  1x incr dose    Mis-dose      Date 1/14/19 1/28 2/11 2/22 2/28 3/7 3/20 3/27 4/10  4/24  5/1   Total Weekly Dose 23.75  mg 23.75  mg 23.75  mg 23.75  mg 23.75  mg 23.75  mg 23.75  mg 23.75  mg 23.75  mg 23.75mg  23.75 mg   INR 2.3 2.0 2.1 2.4 2.8 2.4 2.3 2.8 2.9 1.8  1.9   Notes  1 miss     fall         Date 5/8 5/15      Total Weekly Dose 25mg 23.75      INR 2.2 2.6      Notes          Phone  Interview:  Tablet Strength: 5mg tablet  Patient Contact Info: Mikayla Chaves, Cell (daughter) [phone]    Patient Findings:  Negatives:  Signs/symptoms of thrombosis, Signs/symptoms of bleeding, Laboratory test error suspected, Change in health, Change in alcohol use, Change in activity, Upcoming invasive procedure, Emergency department visit, Upcoming dental procedure, Missed doses, Extra doses, Change in medications, Change in diet/appetite, Hospital admission, Bruising, Other complaints     Plan:  1. INR is therapeutic today at 2.6. Instructed Mikayla to have Mrs. Deutsch to continue her usual maintenance dose of warfarin 3.75mg oral daily except 2.5 mg ThursSat.  2. Repeat INR in one week 5/22.  3. Verbal information provided over the phone. Adelaide Deutsch's daughter, Mikayla, RBV dosing instructions, expresses understanding by teach back, and has no further questions at this time.      Oscar Butts, Pharmacy Intern  5/15/2019  9:02 AM    I, Reena Moore, PharmD, have reviewed the note in full and agree with the assessment and plan.  05/15/19  12:17 PM

## 2019-05-22 ENCOUNTER — ANTICOAGULATION VISIT (OUTPATIENT)
Dept: PHARMACY | Facility: HOSPITAL | Age: 84
End: 2019-05-22

## 2019-05-22 DIAGNOSIS — I48.20 CHRONIC ATRIAL FIBRILLATION (HCC): ICD-10-CM

## 2019-05-22 LAB — INR PPP: 2.5

## 2019-05-22 RX ORDER — LISINOPRIL AND HYDROCHLOROTHIAZIDE 20; 12.5 MG/1; MG/1
1 TABLET ORAL DAILY
COMMUNITY
Start: 2015-09-21 | End: 2020-01-01 | Stop reason: ALTCHOICE

## 2019-05-22 NOTE — PROGRESS NOTES
Anticoagulation Clinic - Remote Progress Note  [facility]  Frequency of Monitorin days    Indication: chronic afib  Referring Provider: Luiza  Initial Warfarin Start Date:   Goal INR: 2.0-3.0  Current Drug Interactions: Omeprazole, MVI, CoQ10, Levothyroxine  CHADS-VASc: 4 [HTN, Age>75, Female]  Clinic: 2018    Diet: Green beans, glenroy slaw once week, iceberg salad ~2x a week (19)  Alcohol: None  Tobacco: None  OTC Pain Medication: Tylenol    INR History:  Date 12/11 12/20 1/3/18 1/18 1/31 2/14 2/22 2/28 3/7 3/23 4/2 4/11 4/25   Total Weekly Dose 27.5 mg 27.5 mg 27.5  mg 27.5 mg   27.5mg 27.5 mg 25mg 27.5mg 27.5  mg 27.5 mg 27.5mg 27.5 mg 27.5  mg   INR 2.7 3.0 3.0 2.7 2.5 3.5 2.2 2.9 2.4 3.1 2.2 2.9 2.8   Notes       Held 1x    1 decr dose       Date 5/10 5/17 5/23 6/6 6/13 6/20 6/22 6/27 7/1 7/5   Total Weekly Dose 25mg 27.5mg 27.5mg 26.25mg 27.5mg 25mg 20mg 20mg 20mg 17.5mg   INR 1.9 2.3 2.4 3.1 3.8; 4.0 3.9 2.4 3.2 2.1 2.6   Notes missed 1x dose, self adj   Less GLV; daughter [place] [facility]; Less GLV    More GLV; dose decr.      Date 7/11 7/18 7/25 8/1 8/7 8/15 8/22 9/5 9/10 9/17   Total Weekly Dose 17.5mg 18.75mg 18.75mg 20mg 22.5mg 20mg 20mg 20mg 22.5mg 22.5mg   INR 1.9 1.7 2.0 1.5 1.8 2.1 2.1 1.5 1.7 1.5   Notes     boost boost   boost      Date 9/24 10/1 10/15 10/22 10/29 11/5 11/19 11/26 12/10 12/24 12/31   Total Weekly Dose 23.75  mg 22.5mg 22.5mg 18.75mg 22.5mg 23.75mg 23.75mg 23.75mg 23.75mg 22.5mg 23.75mg   INR 2.1 2.1 2.7 1.8 1.9 2.1 2.4 2.5 2.2 1.9 2.3   Notes boost   missed 10/15 x 1  1x incr dose    Mis-dose      Date 1/14/19 1/28 2/11 2/22 2/28 3/7 3/20 3/27 4/10 4/24 5/1   Total Weekly Dose 23.75  mg 23.75  mg 23.75  mg 23.75  mg 23.75  mg 23.75  mg 23.75  mg 23.75  mg 23.75  mg 23.75  mg 23.75  mg   INR 2.3 2.0 2.1 2.4 2.8 2.4 2.3 2.8 2.9 1.8 1.9   Notes  1 miss     fall         Date 5/8 5/15 5/22     Total Weekly Dose 25mg 23.75  mg 23.75  mg     INR 2.2 2.6 2.5     Notes           Phone Interview:  Tablet Strength: 5mg tablet  Patient Contact Info: Mikayla Chaves, Cell (daughter) [phone]    Patient Findings:  Negatives:  Signs/symptoms of thrombosis, Signs/symptoms of bleeding, Laboratory test error suspected, Change in health, Change in alcohol use, Change in activity, Upcoming invasive procedure, Emergency department visit, Upcoming dental procedure, Missed doses, Extra doses, Change in medications, Change in diet/appetite, Hospital admission, Bruising, Other complaints     Plan:  1. INR is therapeutic today. Instructed Mikayla to have Mrs. Deutsch to continue her usual maintenance dose of warfarin 3.75mg daily except 2.5mg ThursSat.  2. Repeat INR in 2 weeks  3. Verbal information provided over the phone. Adelaide Deutsch's daughter, Mikayla, RBV dosing instructions, expresses understanding by teach back, and has no further questions at this time.    Loco Valle CPhT  5/22/2019  3:06 PM

## 2019-05-22 NOTE — PROGRESS NOTES
I, Rhonda Singh, PharmD, have reviewed the note in full and agree with the assessment and plan.  05/22/19  4:26 PM

## 2019-06-04 ENCOUNTER — TELEPHONE (OUTPATIENT)
Dept: PHARMACY | Facility: HOSPITAL | Age: 84
End: 2019-06-04

## 2019-06-04 NOTE — TELEPHONE ENCOUNTER
Mrs. Duetsch has moved to [facility] in [place], [place]. Their staff will check her INR with her home monitor tomorrow, 6/5, and report the result to [facility]. Our clinic will then need to contact Christiana ([phone]) with warfarin dosing instructions.     In the meantime, Mikayla, Mrs. Deutsch's daughter, will also be talking with insurance and [facility] staff about having their on-site providers manage her mother's warfarin. She will contact us with any necessary updates.    Ann Cowden Mayer, PharmD  6/4/2019  8:52 AM

## 2019-06-05 ENCOUNTER — ANTICOAGULATION VISIT (OUTPATIENT)
Dept: PHARMACY | Facility: HOSPITAL | Age: 84
End: 2019-06-05

## 2019-06-05 DIAGNOSIS — I48.20 CHRONIC ATRIAL FIBRILLATION (HCC): ICD-10-CM

## 2019-06-05 LAB — INR PPP: 2.1

## 2019-06-05 NOTE — PROGRESS NOTES
Anticoagulation Clinic - Remote Progress Note  [facility]  Frequency of Monitorin days    Indication: chronic afib  Referring Provider: Luiza  Initial Warfarin Start Date:   Goal INR: 2.0-3.0  Current Drug Interactions: Omeprazole, MVI, CoQ10, Levothyroxine  CHADS-VASc: 4 [HTN, Age>75, Female]  Clinic: 2018    Diet: Green beans, glenroy slaw once week, iceberg salad ~2x a week (19)  Alcohol: None  Tobacco: None  OTC Pain Medication: Tylenol    INR History:  Date 12/11 12/20 1/3/18 1/18 1/31 2/14 2/22 2/28 3/7 3/23 4/2 4/11 4/25   Total Weekly Dose 27.5 mg 27.5 mg 27.5  mg 27.5 mg   27.5mg 27.5 mg 25mg 27.5mg 27.5  mg 27.5 mg 27.5mg 27.5 mg 27.5  mg   INR 2.7 3.0 3.0 2.7 2.5 3.5 2.2 2.9 2.4 3.1 2.2 2.9 2.8   Notes       Held 1x    1 decr dose       Date 5/10 5/17 5/23 6/6 6/13 6/20 6/22 6/27 7/1 7/5   Total Weekly Dose 25mg 27.5mg 27.5mg 26.25mg 27.5mg 25mg 20mg 20mg 20mg 17.5mg   INR 1.9 2.3 2.4 3.1 3.8; 4.0 3.9 2.4 3.2 2.1 2.6   Notes missed 1x dose, self adj   Less GLV; daughter [place] clinic; Less GLV    More GLV; dose decr.      Date 7/11 7/18 7/25 8/1 8/7 8/15 8/22 9/5 9/10 9/17   Total Weekly Dose 17.5mg 18.75mg 18.75mg 20mg 22.5mg 20mg 20mg 20mg 22.5mg 22.5mg   INR 1.9 1.7 2.0 1.5 1.8 2.1 2.1 1.5 1.7 1.5   Notes     boost boost   boost      Date 9/24 10/1 10/15 10/22 10/29 11/5 11/19 11/26 12/10 12/24 12/31   Total Weekly Dose 23.75  mg 22.5mg 22.5mg 18.75mg 22.5mg 23.75mg 23.75mg 23.75mg 23.75mg 22.5mg 23.75mg   INR 2.1 2.1 2.7 1.8 1.9 2.1 2.4 2.5 2.2 1.9 2.3   Notes boost   missed 10/15 x 1  1x incr dose    Mis-dose      Date 1/14/19 1/28 2/11 2/22 2/28 3/7 3/20 3/27 4/10 4/24 5/1   Total Weekly Dose 23.75  mg 23.75  mg 23.75  mg 23.75  mg 23.75  mg 23.75  mg 23.75  mg 23.75  mg 23.75  mg 23.75  mg 23.75  mg   INR 2.3 2.0 2.1 2.4 2.8 2.4 2.3 2.8 2.9 1.8 1.9   Notes  1 miss     fall         Date 5/8 5/15 5/22 6/5         Total Weekly Dose 25mg 23.75  mg 23.75  mg 23.75  mg 23.75  mg         INR 2.2 2.6 2.5 2.1         Notes               Phone Interview:  Tablet Strength: 5mg tablet  Patient Contact Info: Mikayla Chaves, Cell (daughter) [phone]  [facility] Contact: Christiana ([phone])*preferred     Patient Findings:  Negatives:  Signs/symptoms of thrombosis, Signs/symptoms of bleeding, Laboratory test error suspected, Change in health, Change in alcohol use, Change in activity, Upcoming invasive procedure, Emergency department visit, Upcoming dental procedure, Missed doses, Extra doses, Change in medications, Change in diet/appetite, Hospital admission, Bruising, Other complaints   Comments:  Christiana reported no changes. Will update us if any changes occur. At this time still waiting to see what Mikayla (daughter) finds out concerning who will manage Mrs. Deutsch's warfarin.      Plan:  1. INR is therapeutic today at 2.1. Instructed Christiana to have Mrs. Deutsch continue her usual maintenance dose of warfarin 3.75mg daily except 2.5mg on ThursSat. Of note, INR has been on a downward trend. May need to increase maintenance dose next check, however patient has been therapeutic on 23.75mg previously.  2. Repeat INR in 2 weeks on 6/19.  3. Verbal information provided over the phone. Adelaide Deutsch's daughter, Mikayla, RBV dosing instructions, expresses understanding by teach back, and has no further questions at this time.    Oscar Butts, Pharmacy Intern  6/5/2019  9:03 AM    Eugenio SHIN, [facility], have reviewed the note in full and agree with the assessment and plan.  06/05/19  9:39 AM

## 2019-06-19 ENCOUNTER — ANTICOAGULATION VISIT (OUTPATIENT)
Dept: PHARMACY | Facility: HOSPITAL | Age: 84
End: 2019-06-19

## 2019-06-19 DIAGNOSIS — I48.20 CHRONIC ATRIAL FIBRILLATION (HCC): ICD-10-CM

## 2019-06-19 LAB — INR PPP: 2

## 2019-06-19 NOTE — PROGRESS NOTES
Anticoagulation Clinic - Remote Progress Note  [facility]  Frequency of Monitorin days    Indication: chronic afib  Referring Provider: Luiza  Initial Warfarin Start Date:   Goal INR: 2.0-3.0  Current Drug Interactions: Omeprazole, MVI, CoQ10, Levothyroxine  CHADS-VASc: 4 [HTN, Age>75, Female]  Clinic: 2018    Diet: Green beans, glenroy slaw once week, iceberg salad ~2x a week (19)  Alcohol: None  Tobacco: None  OTC Pain Medication: Tylenol    INR History:  Date 12/11 12/20 1/3/18 1/18 1/31 2/14 2/22 2/28 3/7 3/23 4/2 4/11 4/25   Total Weekly Dose 27.5 mg 27.5 mg 27.5  mg 27.5 mg   27.5mg 27.5 mg 25mg 27.5mg 27.5  mg 27.5 mg 27.5mg 27.5 mg 27.5  mg   INR 2.7 3.0 3.0 2.7 2.5 3.5 2.2 2.9 2.4 3.1 2.2 2.9 2.8   Notes       Held 1x    1 decr dose       Date 5/10 5/17 5/23 6/6 6/13 6/20 6/22 6/27 7/1 7/5   Total Weekly Dose 25mg 27.5mg 27.5mg 26.25mg 27.5mg 25mg 20mg 20mg 20mg 17.5mg   INR 1.9 2.3 2.4 3.1 3.8; 4.0 3.9 2.4 3.2 2.1 2.6   Notes missed 1x dose, self adj   Less GLV; daughter [place] clinic; Less GLV    More GLV; dose decr.      Date 7/11 7/18 7/25 8/1 8/7 8/15 8/22 9/5 9/10 9/17   Total Weekly Dose 17.5mg 18.75mg 18.75mg 20mg 22.5mg 20mg 20mg 20mg 22.5mg 22.5mg   INR 1.9 1.7 2.0 1.5 1.8 2.1 2.1 1.5 1.7 1.5   Notes     boost boost   boost      Date 9/24 10/1 10/15 10/22 10/29 11/5 11/19 11/26 12/10 12/24 12/31   Total Weekly Dose 23.75  mg 22.5mg 22.5mg 18.75mg 22.5mg 23.75mg 23.75mg 23.75mg 23.75mg 22.5mg 23.75mg   INR 2.1 2.1 2.7 1.8 1.9 2.1 2.4 2.5 2.2 1.9 2.3   Notes boost   missed 10/15 x 1  1x incr dose    Mis-dose      Date 1/14/19 1/28 2/11 2/22 2/28 3/7 3/20 3/27 4/10 4/24 5/1   Total Weekly Dose 23.75  mg 23.75  mg 23.75  mg 23.75  mg 23.75  mg 23.75  mg 23.75  mg 23.75  mg 23.75  mg 23.75  mg 23.75  mg   INR 2.3 2.0 2.1 2.4 2.8 2.4 2.3 2.8 2.9 1.8 1.9   Notes  1 miss     fall         Date 5/8 5/15 5/22 6/5  6/19        Total Weekly Dose 25mg 23.75  mg 23.75  mg 23.75  mg  23.75  mg  25 mg       INR 2.2 2.6 2.5 2.1  2        Notes      inc GLV at [place]          Phone Interview:  Tablet Strength: 5mg tablet  Patient Contact Info: Mikayla Chaves, Cell (daughter) [phone]  [facility] [place] Contact: Christiana ([phone])*preferred     Patient Findings:  Negatives:  Signs/symptoms of thrombosis, Signs/symptoms of bleeding, Laboratory test error suspected, Change in health, Change in alcohol use, Change in activity, Upcoming invasive procedure, Emergency department visit, Upcoming dental procedure, Missed doses, Extra doses, Change in medications, Change in diet/appetite, Hospital admission, Bruising, Other complaints   Comments:  Christiana reported no changes. She did say that Mrs. Deutsch has vegetables provided every day at the [facility].     At this time still waiting to see what Mikayla (daughter) finds out concerning who will manage Mrs. Deutsch's warfarin.      Plan:  1. INR is therapeutic today at 2, although at [facility]. Instructed Christiana to have Mrs. Deutsch take warfarin 3.75mg daily except 2.5mg on Saturday for this week. (5.3% inc)      Of note, INR has been on a downward trend. May need to consider increasing maintenance dose due to increased vegetables while at the [place].  (patient has been     therapeutic on 23.75mg previously)  2. Repeat INR in 1 week on 6/26.  3. Verbal information provided over the phone. Adelaide Deutsch's daughter, Mikayla, RBV dosing instructions, expresses understanding by teach back, and has no further questions at this time.    Reena Moore, PharmD  6/19/2019  8:50 AM

## 2019-06-26 ENCOUNTER — ANTICOAGULATION VISIT (OUTPATIENT)
Dept: PHARMACY | Facility: HOSPITAL | Age: 84
End: 2019-06-26

## 2019-06-26 DIAGNOSIS — I48.20 CHRONIC ATRIAL FIBRILLATION (HCC): ICD-10-CM

## 2019-06-26 LAB — INR PPP: 2.6

## 2019-06-26 NOTE — PROGRESS NOTES
Anticoagulation Clinic - Remote Progress Note  [facility]  Frequency of Monitorin days    Indication: chronic afib  Referring Provider: Luiza  Initial Warfarin Start Date:   Goal INR: 2.0-3.0  Current Drug Interactions: Omeprazole, MVI, CoQ10, Levothyroxine  CHADS-VASc: 4 [HTN, Age>75, Female]  Clinic: 2018    Diet: Green beans, glenroy slaw once week, iceberg salad ~2x a week (19)  Alcohol: None  Tobacco: None  OTC Pain Medication: Tylenol    INR History:  Date 12/11 12/20 1/3/18 1/18 1/31 2/14 2/22 2/28 3/7 3/23 4/2 4/11 4/25   Total Weekly Dose 27.5 mg 27.5 mg 27.5  mg 27.5 mg   27.5mg 27.5 mg 25mg 27.5mg 27.5  mg 27.5 mg 27.5mg 27.5 mg 27.5  mg   INR 2.7 3.0 3.0 2.7 2.5 3.5 2.2 2.9 2.4 3.1 2.2 2.9 2.8   Notes       Held 1x    1 decr dose       Date 5/10 5/17 5/23 6/6 6/13 6/20 6/22 6/27 7/1 7/5   Total Weekly Dose 25mg 27.5mg 27.5mg 26.25mg 27.5mg 25mg 20mg 20mg 20mg 17.5mg   INR 1.9 2.3 2.4 3.1 3.8; 4.0 3.9 2.4 3.2 2.1 2.6   Notes missed 1x dose, self adj   Less GLV; daughter [place] [facility]; Less GLV    More GLV; dose decr.      Date 7/11 7/18 7/25 8/1 8/7 8/15 8/22 9/5 9/10 9/17   Total Weekly Dose 17.5mg 18.75mg 18.75mg 20mg 22.5mg 20mg 20mg 20mg 22.5mg 22.5mg   INR 1.9 1.7 2.0 1.5 1.8 2.1 2.1 1.5 1.7 1.5   Notes     boost boost   boost      Date 9/24 10/1 10/15 10/22 10/29 11/5 11/19 11/26 12/10 12/24 12/31   Total Weekly Dose 23.75  mg 22.5mg 22.5mg 18.75mg 22.5mg 23.75mg 23.75mg 23.75mg 23.75mg 22.5mg 23.75mg   INR 2.1 2.1 2.7 1.8 1.9 2.1 2.4 2.5 2.2 1.9 2.3   Notes boost   missed 10/15 x 1  1x incr dose    Mis-dose      Date 1/14/19 1/28 2/11 2/22 2/28 3/7 3/20 3/27 4/10 4/24 5/1   Total Weekly Dose 23.75  mg 23.75  mg 23.75  mg 23.75  mg 23.75  mg 23.75  mg 23.75  mg 23.75  mg 23.75  mg 23.75  mg 23.75  mg   INR 2.3 2.0 2.1 2.4 2.8 2.4 2.3 2.8 2.9 1.8 1.9   Notes  1 miss     fall         Date 5/8 5/15 5/22 6/5  6/19 6/26       Total Weekly Dose 25mg 23.75  mg 23.75  mg 23.75  mg  23.75  mg  25 mg 25 mg      INR 2.2 2.6 2.5 2.1  2 2.6       Notes      inc GLV at [place]          Phone Interview:  Tablet Strength: 5mg tablet  Patient Contact Info: Mikayla Chaves, Cell (daughter) [phone]  [facility] [place] Contact: Christiana ([phone])*preferred     Patient Findings:   Negatives:  Signs/symptoms of thrombosis, Signs/symptoms of bleeding, Laboratory test error suspected, Change in health, Change in alcohol use, Change in activity, Upcoming invasive procedure, Emergency department visit, Upcoming dental procedure, Missed doses, Extra doses, Change in medications, Change in diet/appetite, Hospital admission, Bruising, Other complaints   Comments:  Spoke with RN Erinn who reports no changes. Mrs. Deutsch is still eating the same amount of vegetables daily.     At this time still waiting to see what Mikayla (daughter) finds out concerning who will manage Mrs. Deutsch's warfarin.        Plan:  1. INR is therapeutic today at 2.6. Instructed Erinn to have Mrs. Deutsch continue recently increased dose of warfarin 3.75mg daily except 2.5mg on Saturday for this week.  2. Repeat INR in one week to ensure WNL on increased dose.   3. Verbal information provided over the phone. Adelaide Deutsch's RN, Erinn, RBV dosing instructions, expresses understanding by teach back, and has no further questions at this time.    Rama Kay, PharmD  Pharmacy Resident  6/26/2019  8:18 AM

## 2019-07-03 ENCOUNTER — ANTICOAGULATION VISIT (OUTPATIENT)
Dept: PHARMACY | Facility: HOSPITAL | Age: 84
End: 2019-07-03

## 2019-07-03 DIAGNOSIS — I48.20 CHRONIC ATRIAL FIBRILLATION (HCC): ICD-10-CM

## 2019-07-03 LAB — INR PPP: 1.5

## 2019-07-03 NOTE — PROGRESS NOTES
Anticoagulation Clinic - Remote Progress Note  [employer]  Frequency of Monitorin days    Indication: chronic afib  Referring Provider: Luiza  Initial Warfarin Start Date:   Goal INR: 2.0-3.0  Current Drug Interactions: Omeprazole, MVI, CoQ10, Levothyroxine  CHADS-VASc: 4 [HTN, Age>75, Female]  Clinic: 2018    Diet: Green beans, glenroy slaw once week, iceberg salad ~2x a week (19)  Alcohol: None  Tobacco: None  OTC Pain Medication: Tylenol    INR History:  Date 12/11 12/20 1/3/18 1/18 1/31 2/14 2/22 2/28 3/7 3/23 4/2 4/11 4/25   Total Weekly Dose 27.5 mg 27.5 mg 27.5  mg 27.5 mg   27.5mg 27.5 mg 25mg 27.5mg 27.5  mg 27.5 mg 27.5mg 27.5 mg 27.5  mg   INR 2.7 3.0 3.0 2.7 2.5 3.5 2.2 2.9 2.4 3.1 2.2 2.9 2.8   Notes       Held 1x    1 decr dose       Date 5/10 5/17 5/23 6/6 6/13 6/20 6/22 6/27 7/1 7/5   Total Weekly Dose 25mg 27.5mg 27.5mg 26.25mg 27.5mg 25mg 20mg 20mg 20mg 17.5mg   INR 1.9 2.3 2.4 3.1 3.8; 4.0 3.9 2.4 3.2 2.1 2.6   Notes missed 1x dose, self adj   Less GLV; daughter [place] clinic; Less GLV    More GLV; dose decr.      Date 7/11 7/18 7/25 8/1 8/7 8/15 8/22 9/5 9/10 9/17   Total Weekly Dose 17.5mg 18.75mg 18.75mg 20mg 22.5mg 20mg 20mg 20mg 22.5mg 22.5mg   INR 1.9 1.7 2.0 1.5 1.8 2.1 2.1 1.5 1.7 1.5   Notes     boost boost   boost      Date 9/24 10/1 10/15 10/22 10/29 11/5 11/19 11/26 12/10 12/24 12/31   Total Weekly Dose 23.75  mg 22.5mg 22.5mg 18.75mg 22.5mg 23.75mg 23.75mg 23.75mg 23.75mg 22.5mg 23.75mg   INR 2.1 2.1 2.7 1.8 1.9 2.1 2.4 2.5 2.2 1.9 2.3   Notes boost   missed 10/15 x 1  1x incr dose    Mis-dose      Date 1/14/19 1/28 2/11 2/22 2/28 3/7 3/20 3/27 4/10 4/24 5/1   Total Weekly Dose 23.75  mg 23.75  mg 23.75  mg 23.75  mg 23.75  mg 23.75  mg 23.75  mg 23.75  mg 23.75  mg 23.75  mg 23.75  mg   INR 2.3 2.0 2.1 2.4 2.8 2.4 2.3 2.8 2.9 1.8 1.9   Notes  1 miss     fall         Date 5/8 5/15 5/22 6/5  6/19 6/26 7/3      Total Weekly Dose 25mg 23.75  mg 23.75  mg 23.75  mg  23.75  mg  25 mg 23.75 mg      INR 2.2 2.6 2.5 2.1  2 2.6  1.5      Notes      inc GLV at [place]   missed dose        Phone Interview:  Tablet Strength: 5mg tablet  Patient Contact Info: Mikayla Chaves, Cell (daughter) [phone]  [facility] Contact: Christiana ([phone])*preferred     Patient Findings   Positives:  Missed doses   Negatives:  Signs/symptoms of thrombosis, Signs/symptoms of bleeding, Laboratory test error suspected, Change in health, Change in alcohol use, Change in activity, Upcoming invasive procedure, Emergency department visit, Upcoming dental procedure, Extra doses, Change in medications, Change in diet/appetite, Hospital admission, Bruising, Other complaints   Comments:  Christiana stated that she found 1/2 of a warfarin tablet on her floor on Tuesday morning.   Christiana will also check with the other nurses to ensure they are giving her the appropriate dose (1.5 tabs when 3.75 mg dose ordered).   Denies changes      Plan:  1. INR is sub therapeutic today at 1.5. Instructed Erinn to have Mrs. Deutsch BOOST warfarin to 5 mg today then tomorrow resume warfarin 3.75mg oral daily except 2.5mg on Saturday.  2. Repeat INR on 7/8 to ensure back wnl  3. Verbal information provided over the phone. Adelaide Deutsch's RN, Erinn, RBV dosing instructions, expresses understanding by teach back, and has no further questions at this time.    Reena Moore, PharmD  7/3/2019  8:01 AM

## 2019-07-08 ENCOUNTER — ANTICOAGULATION VISIT (OUTPATIENT)
Dept: PHARMACY | Facility: HOSPITAL | Age: 84
End: 2019-07-08

## 2019-07-08 DIAGNOSIS — I48.20 CHRONIC ATRIAL FIBRILLATION (HCC): ICD-10-CM

## 2019-07-08 LAB — INR PPP: 2

## 2019-07-08 NOTE — PROGRESS NOTES
Anticoagulation Clinic - Remote Progress Note  [facility]  Frequency of Monitorin days    Indication: chronic afib  Referring Provider: Luiza  Initial Warfarin Start Date:   Goal INR: 2.0-3.0  Current Drug Interactions: Omeprazole, MVI, CoQ10, Levothyroxine  CHADS-VASc: 4 [HTN, Age>75, Female]  Clinic: 2018    Diet: Green beans, glenroy slaw once week, iceberg salad ~2x a week (19)  Alcohol: None  Tobacco: None  OTC Pain Medication: Tylenol    INR History:  Date 12/11 12/20 1/3/18 1/18 1/31 2/14 2/22 2/28 3/7 3/23 4/2 4/11 4/25   Total Weekly Dose 27.5 mg 27.5 mg 27.5  mg 27.5 mg   27.5mg 27.5 mg 25mg 27.5mg 27.5  mg 27.5 mg 27.5mg 27.5 mg 27.5  mg   INR 2.7 3.0 3.0 2.7 2.5 3.5 2.2 2.9 2.4 3.1 2.2 2.9 2.8   Notes       Held 1x    1 decr dose       Date 5/10 5/17 5/23 6/6 6/13 6/20 6/22 6/27 7/1 7/5   Total Weekly Dose 25mg 27.5mg 27.5mg 26.25mg 27.5mg 25mg 20mg 20mg 20mg 17.5mg   INR 1.9 2.3 2.4 3.1 3.8; 4.0 3.9 2.4 3.2 2.1 2.6   Notes missed 1x dose, self adj   Less GLV; daughter [place] clinic; Less GLV    More GLV; dose decr.      Date 7/11 7/18 7/25 8/1 8/7 8/15 8/22 9/5 9/10 9/17   Total Weekly Dose 17.5mg 18.75mg 18.75mg 20mg 22.5mg 20mg 20mg 20mg 22.5mg 22.5mg   INR 1.9 1.7 2.0 1.5 1.8 2.1 2.1 1.5 1.7 1.5   Notes     boost boost   boost      Date 9/24 10/1 10/15 10/22 10/29 11/5 11/19 11/26 12/10 12/24 12/31   Total Weekly Dose 23.75  mg 22.5mg 22.5mg 18.75mg 22.5mg 23.75mg 23.75mg 23.75mg 23.75mg 22.5mg 23.75mg   INR 2.1 2.1 2.7 1.8 1.9 2.1 2.4 2.5 2.2 1.9 2.3   Notes boost   missed 10/15 x 1  1x incr dose    Mis-dose      Date 1/14/19 1/28 2/11 2/22 2/28 3/7 3/20 3/27 4/10 4/24 5/1   Total Weekly Dose 23.75  mg 23.75  mg 23.75  mg 23.75  mg 23.75  mg 23.75  mg 23.75  mg 23.75  mg 23.75  mg 23.75  mg 23.75  mg   INR 2.3 2.0 2.1 2.4 2.8 2.4 2.3 2.8 2.9 1.8 1.9   Notes  1 miss     fall         Date 5/8 5/15 5/22 6/5  6/19 6/26 7/3 7/8     Total Weekly Dose 25mg 23.75  mg 23.75  mg  23.75  mg 23.75  mg  25 mg 23.75 mg 25mg     INR 2.2 2.6 2.5 2.1  2 2.6  1.5 2.0     Notes      inc GLV at [place]   missed dose bruising       Phone Interview:  Tablet Strength: 5mg tablet  Patient Contact Info: Mikayla Chaves, Cell (daughter) [phone]  The [place] Contact: Christiana ([phone])*preferred     Patient Findings:  Positives:  Bruising   Negatives:  Signs/symptoms of thrombosis, Signs/symptoms of bleeding, Laboratory test error suspected, Change in health, Change in alcohol use, Change in activity, Upcoming invasive procedure, Emergency department visit, Upcoming dental procedure, Missed doses, Extra doses, Change in medications, Change in diet/appetite, Hospital admission, Other complaints   Comments:  Christiana confirmed warfarin dosing for Ms. Deutsch. She states occasionally Ms. Deutsch will bump her arm into the door handles of the facility so she has some small bruising on her arms. Otherwise denies any changes with patient.     Plan:  1. INR is back WNL today although at the bottom of goal range. Instructed Christiana to have Mrs. Deutsch target 25mg again this week: warfarin 3.75mg daily except 2.5mg on Saturdays.  2. Repeat INR in one week.  3. Verbal information provided over the phone. Adelaide Deutsch's RN, Erinn, FRANCISCO dosing instructions, expresses understanding by teach back, and has no further questions at this time.    Eleanor Patino, Kendall  7/8/2019  9:56 AM    ILoco, [facility], have reviewed the note in full and agree with the assessment and plan.  07/08/19  10:29 AM

## 2019-07-15 ENCOUNTER — ANTICOAGULATION VISIT (OUTPATIENT)
Dept: PHARMACY | Facility: HOSPITAL | Age: 84
End: 2019-07-15

## 2019-07-15 DIAGNOSIS — I48.20 CHRONIC ATRIAL FIBRILLATION (HCC): ICD-10-CM

## 2019-07-15 LAB — INR PPP: 2.2

## 2019-07-15 NOTE — PROGRESS NOTES
Anticoagulation Clinic - Remote Progress Note  [facility] [employer]  Frequency of Monitorin days    Indication: chronic afib  Referring Provider: Luiza  Initial Warfarin Start Date:   Goal INR: 2.0-3.0  Current Drug Interactions: Omeprazole, MVI, CoQ10, Levothyroxine  CHADS-VASc: 4 [HTN, Age>75, Female]  Clinic: 2018    Diet: Green beans, glenroy slaw once week, iceberg salad ~2x a week (19)  Alcohol: None  Tobacco: None  OTC Pain Medication: Tylenol    INR History:  Date 12/11 12/20 1/3/18 1/18 1/31 2/14 2/22 2/28 3/7 3/23 4/2 4/11 4/25   Total Weekly Dose 27.5 mg 27.5 mg 27.5  mg 27.5 mg   27.5mg 27.5 mg 25mg 27.5mg 27.5  mg 27.5 mg 27.5mg 27.5 mg 27.5  mg   INR 2.7 3.0 3.0 2.7 2.5 3.5 2.2 2.9 2.4 3.1 2.2 2.9 2.8   Notes       Held 1x    1 decr dose       Date 5/10 5/17 5/23 6/6 6/13 6/20 6/22 6/27 7/1 7/5   Total Weekly Dose 25mg 27.5mg 27.5mg 26.25mg 27.5mg 25mg 20mg 20mg 20mg 17.5mg   INR 1.9 2.3 2.4 3.1 3.8; 4.0 3.9 2.4 3.2 2.1 2.6   Notes missed 1x dose, self adj   Less GLV; daughter [place] clinic; Less GLV    More GLV; dose decr.      Date 7/11 7/18 7/25 8/1 8/7 8/15 8/22 9/5 9/10 9/17   Total Weekly Dose 17.5mg 18.75mg 18.75mg 20mg 22.5mg 20mg 20mg 20mg 22.5mg 22.5mg   INR 1.9 1.7 2.0 1.5 1.8 2.1 2.1 1.5 1.7 1.5   Notes     boost boost   boost      Date 9/24 10/1 10/15 10/22 10/29 11/5 11/19 11/26 12/10 12/24 12/31   Total Weekly Dose 23.75  mg 22.5mg 22.5mg 18.75mg 22.5mg 23.75mg 23.75mg 23.75mg 23.75mg 22.5mg 23.75mg   INR 2.1 2.1 2.7 1.8 1.9 2.1 2.4 2.5 2.2 1.9 2.3   Notes boost   missed 10/15 x 1  1x incr dose    Mis-dose      Date 1/14/19 1/28 2/11 2/22 2/28 3/7 3/20 3/27 4/10 4/24 5/1   Total Weekly Dose 23.75  mg 23.75  mg 23.75  mg 23.75  mg 23.75  mg 23.75  mg 23.75  mg 23.75  mg 23.75  mg 23.75  mg 23.75  mg   INR 2.3 2.0 2.1 2.4 2.8 2.4 2.3 2.8 2.9 1.8 1.9   Notes  1 miss     fall         Date 5/8 5/15 5/22 6/5  6/19 6/26 7/3 7/8 7/15     Total Weekly Dose 25mg 23.75  mg 23.75  mg  23.75  mg 23.75  mg 25 mg 23.75 mg 25mg 25mg     INR 2.2 2.6 2.5 2.1  2.0 2.6 1.5 2.0 2.2     Notes     inc GLV at [place]  missed dose bruising        Phone Interview:  Tablet Strength: 5mg tablet  Patient Contact Info: Mikayla Chaves, Cell (daughter) [phone]  The [place] Contact: Christiana ([phone]) *preferred*    Patient Findings:  Negatives:  Signs/symptoms of thrombosis, Signs/symptoms of bleeding, Laboratory test error suspected, Change in health, Change in alcohol use, Change in activity, Upcoming invasive procedure, Emergency department visit, Upcoming dental procedure, Missed doses, Extra doses, Change in medications, Change in diet/appetite, Hospital admission, Bruising, Other complaints   Comments:  Spoke with Hawa, patient's nurse, who states there have been no changes since last encounter.     Plan:  1. INR is therapeutic today. Instructed Hawa to have Mrs. Deutsch continue warfarin 3.75mg daily except 2.5mg on Saturdays.  2. Repeat INR in one week.  3. Verbal information provided over the phone. Adelaide Deutsch's RN, Hwaa, RBV dosing instructions, expresses understanding by teach back, and has no further questions at this time.    Eleanor Patino, Kendall  7/15/2019  3:32 PM    ILeena, [facility], have reviewed the note in full and agree with the assessment and plan.  07/15/19  3:38 PM

## 2019-07-22 ENCOUNTER — ANTICOAGULATION VISIT (OUTPATIENT)
Dept: PHARMACY | Facility: HOSPITAL | Age: 84
End: 2019-07-22

## 2019-07-22 DIAGNOSIS — I48.20 CHRONIC ATRIAL FIBRILLATION (HCC): ICD-10-CM

## 2019-07-22 LAB — INR PPP: 2.1

## 2019-07-22 NOTE — PROGRESS NOTES
Anticoagulation Clinic - Remote Progress Note  [facility] [employer]  Frequency of Monitorin days    Indication: chronic afib  Referring Provider: Luiza  Initial Warfarin Start Date:   Goal INR: 2.0-3.0  Current Drug Interactions: Omeprazole, MVI, CoQ10, Levothyroxine  CHADS-VASc: 4 [HTN, Age>75, Female]  Clinic: 2018    Diet: Green beans, glenroy slaw once week, iceberg salad ~2x a week (19)  Alcohol: None  Tobacco: None  OTC Pain Medication: Tylenol    INR History:  Date 12/11 12/20 1/3/18 1/18 1/31 2/14 2/22 2/28 3/7 3/23 4/2 4/11 4/25   Total Weekly Dose 27.5 mg 27.5 mg 27.5  mg 27.5 mg   27.5mg 27.5 mg 25mg 27.5mg 27.5  mg 27.5 mg 27.5mg 27.5 mg 27.5  mg   INR 2.7 3.0 3.0 2.7 2.5 3.5 2.2 2.9 2.4 3.1 2.2 2.9 2.8   Notes       Held 1x    1 decr dose       Date 5/10 5/17 5/23 6/6 6/13 6/20 6/22 6/27 7/1 7/5   Total Weekly Dose 25mg 27.5mg 27.5mg 26.25mg 27.5mg 25mg 20mg 20mg 20mg 17.5mg   INR 1.9 2.3 2.4 3.1 3.8; 4.0 3.9 2.4 3.2 2.1 2.6   Notes missed 1x dose, self adj   Less GLV; daughter [place] clinic; Less GLV    More GLV; dose decr.      Date 7/11 7/18 7/25 8/1 8/7 8/15 8/22 9/5 9/10 9/17   Total Weekly Dose 17.5mg 18.75mg 18.75mg 20mg 22.5mg 20mg 20mg 20mg 22.5mg 22.5mg   INR 1.9 1.7 2.0 1.5 1.8 2.1 2.1 1.5 1.7 1.5   Notes     boost boost   boost      Date 9/24 10/1 10/15 10/22 10/29 11/5 11/19 11/26 12/10 12/24 12/31   Total Weekly Dose 23.75  mg 22.5mg 22.5mg 18.75mg 22.5mg 23.75mg 23.75mg 23.75mg 23.75mg 22.5mg 23.75mg   INR 2.1 2.1 2.7 1.8 1.9 2.1 2.4 2.5 2.2 1.9 2.3   Notes boost   missed 10/15 x 1  1x incr dose    Mis-dose      Date 1/14/19 1/28 2/11 2/22 2/28 3/7 3/20 3/27 4/10 4/24 5/1   Total Weekly Dose 23.75  mg 23.75  mg 23.75  mg 23.75  mg 23.75  mg 23.75  mg 23.75  mg 23.75  mg 23.75  mg 23.75  mg 23.75  mg   INR 2.3 2.0 2.1 2.4 2.8 2.4 2.3 2.8 2.9 1.8 1.9   Notes  1 miss     fall         Date 5/8 5/15 5/22 6/5  6/19 6/26 7/3 7/8 7/15 7/22    Total Weekly Dose 25mg 23.75  mg  23.75  mg 23.75  mg 23.75  mg 25 mg 23.75 mg 25mg 25mg 25mg    INR 2.2 2.6 2.5 2.1  2.0 2.6 1.5 2.0 2.2 2.1    Notes     inc GLV at [place]  missed dose bruising        Phone Interview:  Tablet Strength: 5mg tablet  Patient Contact Info: Mikayla Chaves, Cell (daughter) [phone]  The [place] Contact: Christiana ([phone]) *preferred*    Patient Findings:  Negatives:  Signs/symptoms of thrombosis, Signs/symptoms of bleeding, Laboratory test error suspected, Change in health, Change in alcohol use, Change in activity, Upcoming invasive procedure, Emergency department visit, Upcoming dental procedure, Missed doses, Extra doses, Change in medications, Change in diet/appetite, Hospital admission, Bruising, Other complaints   Comments:  Spoke with nurse, Christiana, who confirms warfarin dosing and denies any changes with the patient since last encounter. She also denies any signs/symptoms of bruising or bleeding.     Plan:  1. INR is therapeutic today. Instructed Hawa to have Mrs. Deutsch continue warfarin 3.75mg daily except 2.5mg on Saturdays.  2. Repeat INR in one week.  3. Verbal information provided over the phone. Adelaide Deutsch's RN, Hawa, RBV dosing instructions, expresses understanding by teach back, and has no further questions at this time.    Eleanor Patino, Kendall  7/22/2019  12:02 PM     Erinn SHIN, [facility], have reviewed the note in full and agree with the assessment and plan.  07/22/19  1:54 PM

## 2019-08-05 ENCOUNTER — ANTICOAGULATION VISIT (OUTPATIENT)
Dept: PHARMACY | Facility: HOSPITAL | Age: 84
End: 2019-08-05

## 2019-08-05 DIAGNOSIS — I48.20 CHRONIC ATRIAL FIBRILLATION (HCC): ICD-10-CM

## 2019-08-05 LAB — INR PPP: 1.8

## 2019-08-05 NOTE — PROGRESS NOTES
Anticoagulation Clinic - Remote Progress Note  [facility] [employer]  Frequency of Monitorin days    Indication: chronic afib  Referring Provider: Luiza  Initial Warfarin Start Date:   Goal INR: 2.0-3.0  Current Drug Interactions: Omeprazole, MVI, CoQ10, Levothyroxine  CHADS-VASc: 4 [HTN, Age>75, Female]  Clinic: 2018    Diet: Green beans, glenroy slaw once week, iceberg salad ~2x a week (19)  Alcohol: None  Tobacco: None  OTC Pain Medication: Tylenol    INR History:  Date 12/11 12/20 1/3/18 1/18 1/31 2/14 2/22 2/28 3/7 3/23 4/2 4/11 4/25   Total Weekly Dose 27.5 mg 27.5 mg 27.5  mg 27.5 mg   27.5mg 27.5 mg 25mg 27.5mg 27.5  mg 27.5 mg 27.5mg 27.5 mg 27.5  mg   INR 2.7 3.0 3.0 2.7 2.5 3.5 2.2 2.9 2.4 3.1 2.2 2.9 2.8   Notes       Held 1x    1 decr dose       Date 5/10 5/17 5/23 6/6 6/13 6/20 6/22 6/27 7/1 7/5   Total Weekly Dose 25mg 27.5mg 27.5mg 26.25mg 27.5mg 25mg 20mg 20mg 20mg 17.5mg   INR 1.9 2.3 2.4 3.1 3.8; 4.0 3.9 2.4 3.2 2.1 2.6   Notes missed 1x dose, self adj   Less GLV; daughter [place] clinic; Less GLV    More GLV; dose decr.      Date 7/11 7/18 7/25 8/1 8/7 8/15 8/22 9/5 9/10 9/17   Total Weekly Dose 17.5mg 18.75mg 18.75mg 20mg 22.5mg 20mg 20mg 20mg 22.5mg 22.5mg   INR 1.9 1.7 2.0 1.5 1.8 2.1 2.1 1.5 1.7 1.5   Notes     boost boost   boost      Date 9/24 10/1 10/15 10/22 10/29 11/5 11/19 11/26 12/10 12/24 12/31   Total Weekly Dose 23.75  mg 22.5mg 22.5mg 18.75mg 22.5mg 23.75mg 23.75mg 23.75mg 23.75mg 22.5mg 23.75mg   INR 2.1 2.1 2.7 1.8 1.9 2.1 2.4 2.5 2.2 1.9 2.3   Notes boost   missed 10/15 x 1  1x incr dose    Mis-dose      Date 1/14/19 1/28 2/11 2/22 2/28 3/7 3/20 3/27 4/10 4/24 5/1   Total Weekly Dose 23.75  mg 23.75  mg 23.75  mg 23.75  mg 23.75  mg 23.75  mg 23.75  mg 23.75  mg 23.75  mg 23.75  mg 23.75  mg   INR 2.3 2.0 2.1 2.4 2.8 2.4 2.3 2.8 2.9 1.8 1.9   Notes  1 miss     fall         Date 5/8 5/15 5/22 6/5  6/19 6/26 7/3 7/8 7/15 7/22 8/5   Total Weekly Dose 25mg 23.75  mg  23.75  mg 23.75  mg 23.75  mg 25 mg 23.75 mg 25mg 25mg 25mg    INR 2.2 2.6 2.5 2.1  2.0 2.6 1.5 2.0 2.2 2.1 1.8   Notes     inc KATERYNA at [place]  missed dose bruising        Phone Interview:  Tablet Strength: 5mg tablet  Patient Contact Info: Mikayla Chaves, Cell (daughter) [phone]  The [place] Contact: Christiana ([phone]) *preferred*    Patient Findings   Negatives:  Signs/symptoms of thrombosis, Signs/symptoms of bleeding, Laboratory test error suspected, Change in health, Change in alcohol use, Change in activity, Upcoming invasive procedure, Emergency department visit, Upcoming dental procedure, Missed doses, Extra doses, Change in medications, Change in diet/appetite, Hospital admission, Bruising, Other complaints   Comments:  Spoke with nurse, Christiana, who confirms warfarin dosing and denies any changes with the patient since last encounter. Christiana does report that she had a fall two fridays ago on 7/26. She also denies any signs/symptoms of bleeding. She did note a bruise post fall on her hairline but it has healed. Of note, patient previously was on 27.5mg      Plan:  1. INR is slightly SUBtherapeutic today. Given recent fall and patient age, instructed Christiana to continue warfarin 3.75mg daily except 2.5mg on Saturdays.  2. Repeat INR in 1.5 weeks to ensure WNL. Of note: she is going to see Dr. Jarrett this Wednesday. Additionally if patient returns SUBtherapeutic consider dose increase to 3.75mg daily (patient was previously therapeutic on this dose in 2018)  3. Verbal information provided over the phone. Adelaide Deutsch's RN, Hawa, RBV dosing instructions, expresses understanding by teach back, and has no further questions at this time.    Rhonda Singh, PharmD  8/5/2019  9:19 AM

## 2019-08-07 ENCOUNTER — OFFICE VISIT (OUTPATIENT)
Dept: CARDIOLOGY | Facility: CLINIC | Age: 84
End: 2019-08-07

## 2019-08-07 VITALS
SYSTOLIC BLOOD PRESSURE: 130 MMHG | DIASTOLIC BLOOD PRESSURE: 72 MMHG | HEART RATE: 59 BPM | HEIGHT: 61 IN | BODY MASS INDEX: 19.63 KG/M2 | WEIGHT: 104 LBS | OXYGEN SATURATION: 95 %

## 2019-08-07 DIAGNOSIS — I49.5 TACHY-BRADY SYNDROME (HCC): ICD-10-CM

## 2019-08-07 DIAGNOSIS — I10 ESSENTIAL HYPERTENSION: ICD-10-CM

## 2019-08-07 DIAGNOSIS — I48.21 PERMANENT ATRIAL FIBRILLATION (HCC): Primary | ICD-10-CM

## 2019-08-07 PROCEDURE — 99213 OFFICE O/P EST LOW 20 MIN: CPT | Performed by: INTERNAL MEDICINE

## 2019-08-07 PROCEDURE — 93279 PRGRMG DEV EVAL PM/LDLS PM: CPT | Performed by: INTERNAL MEDICINE

## 2019-08-07 NOTE — PROGRESS NOTES
Adelaide Deutsch  6/11/1924  [phone]      08/07/2019      [facility] CARDIOLOGY     Fidencio, Venkata Wood MD  [address] [place] [id number]    Chief Complaint   Patient presents with   • Atrial Fibrillation       Problem List:    PROBLEM LIST:  1. Tachybrady syndrome:  a. Holter monitor, 11/22/2006, showing heart rate ; average 59 BPM with 29 PVCs, 2,735 PACs, with short episodes of nonsustained atrial tachycardia.   b. Echocardiogram, 11/22/2006: Moderate LAE, EF 65%  c. Persantine 01/04/2007, low probability of ischemia, EF 68%.   d. Implantation of a dual-chamber permanent pacemaker, 07/12/2007 ([facility]).   e. Interrogation of pacemaker in September 2007, consistent with atrial fibrillation and atrial flutter with subsequent initiation of Coumadin and Toprol due to rapid ventricular rates.   f. Conversion to normal sinus rhythm with treatment of sotalol therapy, 10/19/2007.  g. Chronic atrial fibrillation with adequate ventricular rate control on metoprolol.   2. Hypertension.  3. Hyperlipidemia.   4. Anemia.   5. Migraines.   6. Arthritis.   7. Gastroesophageal reflux disease/hiatal hernia.   8. Hypothyroidism.   9. Osteoporosis.   10. Status post cataract removal.  Allergies  No Known Allergies    Current Medications    Current Outpatient Medications:   •  ACETAMINOPHEN PO, Take 650 mg by mouth Daily As Needed for Mild Pain ., Disp: , Rfl:   •  Carboxymethylcell-Hypromellose (GENTEAL OP), Administer 1 drop to both eyes Every Night., Disp: , Rfl:   •  carboxymethylcellulose (RETAINE CMC) 0.5 % solution, Administer 1 drop to both eyes 4 (Four) Times a Day As Needed for Dry Eyes., Disp: , Rfl:   •  citalopram (CeleXA) 10 MG tablet, Take 10 mg by mouth Daily., Disp: , Rfl:   •  coenzyme Q10 100 MG capsule, Take 100 mg by mouth Daily., Disp: , Rfl:   •  Cyanocobalamin (VITAMIN B-12 IJ), Inject  as directed Every 30 (Thirty) Days., Disp: , Rfl:   •  ferrous sulfate 325 (65 FE) MG  "tablet, Take 325 mg by mouth Daily With Breakfast., Disp: , Rfl:   •  furosemide (LASIX) 20 MG tablet, TAKE 1 TABLET BY MOUTH  DAILY, Disp: 90 tablet, Rfl: 2  •  ibandronate (BONIVA) 150 MG tablet, Take 150 mg by mouth Every 30 (Thirty) Days., Disp: , Rfl:   •  levothyroxine (SYNTHROID, LEVOTHROID) 75 MCG tablet, Take 75 mcg by mouth Daily., Disp: , Rfl:   •  lisinopril-hydrochlorothiazide (PRINZIDE,ZESTORETIC) 20-12.5 MG per tablet, Take 1 tablet by mouth Daily., Disp: , Rfl:   •  metoprolol tartrate (LOPRESSOR) 50 MG tablet, TAKE 1 AND 1/2 TABLETS BY  MOUTH TWO TIMES DAILY, Disp: 240 tablet, Rfl: 6  •  Multiple Vitamin (MULTI VITAMIN DAILY PO), Take 1 tablet by mouth Daily., Disp: , Rfl:   •  Multiple Vitamins-Minerals (PRESERVISION AREDS 2 PO), Take 2 tablets by mouth Daily., Disp: , Rfl:   •  omeprazole (priLOSEC) 20 MG capsule, Take 20 mg by mouth Daily., Disp: , Rfl:   •  warfarin (COUMADIN) 2.5 MG tablet, Take 0.5 to 1 tablet by mouth daily or as directed by anticoagulation pharmacist, Disp: 180 tablet, Rfl: 0    History of Present Illness     Pt presents for follow up of AF/bradycardia/HTN .Since the pt has seen us in clinic last, pt denies any syncope, SOB, CP, LH, and dizziness. Denies any hospitalizations, ER visits, bleeding, or TIA/CVA symptoms. Overall feels Ok . BP have been well controlled. HR stable after BBL dose increased    ROS:  General:  + fatigue, No weight gain or loss  Cardiovascular:  Denies CP, PND, syncope, near syncope, edema or palpitations.  Pulmonary:  Denies WILLIS, cough, or wheezing    Vitals:    08/07/19 1128   BP: 130/72   BP Location: Right arm   Patient Position: Sitting   Pulse: 59   SpO2: 95%   Weight: 47.2 kg (104 lb)   Height: 154.9 cm (61\")       PE:  General: NAD  Neck: no JVD, no carotid bruits, no TM  Heart: irreg irrreg rate and rhythm, NL S1, S2,  no rubs, murmurs  Lungs: CTA, no wheezes, rhonchi, or rales  Abd: soft, non-tender, NL BS  Ext: No musculoskeletal " deformities, no edema, cyanosis, or clubbing  Psych: normal mood and affect    Diagnostic Data:  Procedures      1. Permanent atrial fibrillation (CMS/HCC)    2. Tachy-beatriz syndrome (CMS/HCC)    3. Essential hypertension        PM Interrogation: NL PM fxn, Nl battery fxn, CAF, 1% RV paced    Plan:  1. Permanent Afib:  HR better controlled oon higher dose of BBL.   2. HTN: stable on meds  3. Anticoagulation:  Coumadin, Labile INR's last year now stable.      F/up in 12 months

## 2019-08-15 ENCOUNTER — ANTICOAGULATION VISIT (OUTPATIENT)
Dept: PHARMACY | Facility: HOSPITAL | Age: 84
End: 2019-08-15

## 2019-08-15 DIAGNOSIS — I48.20 CHRONIC ATRIAL FIBRILLATION (HCC): ICD-10-CM

## 2019-08-15 LAB — INR PPP: 3.2

## 2019-08-15 NOTE — PROGRESS NOTES
Anticoagulation Clinic - Remote Progress Note  [facility] [employer]  Frequency of Monitorin days    Indication: chronic afib  Referring Provider: Luiza  Initial Warfarin Start Date:   Goal INR: 2.0-3.0  Current Drug Interactions: Omeprazole, MVI, CoQ10, Levothyroxine  CHADS-VASc: 4 [HTN, Age>75, Female]  HAS-BLED: 2 (HTN, Elderly)  Clinic: 2018    Diet: Green beans, glenroy slaw once week, iceberg salad ~2x a week (19)  Alcohol: None  Tobacco: None  OTC Pain Medication: Tylenol    INR History:  Date 12/11 12/20 1/3/18 1/18 1/31 2/14 2/22 2/28 3/7 3/23 4/2 4/11 4/25   Total Weekly Dose 27.5 mg 27.5 mg 27.5  mg 27.5 mg   27.5mg 27.5 mg 25mg 27.5mg 27.5  mg 27.5 mg 27.5mg 27.5 mg 27.5  mg   INR 2.7 3.0 3.0 2.7 2.5 3.5 2.2 2.9 2.4 3.1 2.2 2.9 2.8   Notes       Held 1x    1 decr dose       Date 5/10 5/17 5/23 6/6 6/13 6/20 6/22 6/27 7/1 7/5   Total Weekly Dose 25mg 27.5mg 27.5mg 26.25mg 27.5mg 25mg 20mg 20mg 20mg 17.5mg   INR 1.9 2.3 2.4 3.1 3.8; 4.0 3.9 2.4 3.2 2.1 2.6   Notes missed 1x dose, self adj   Less GLV; daughter [place] clinic; Less GLV    More GLV; dose decr.      Date 7/11 7/18 7/25 8/1 8/7 8/15 8/22 9/5 9/10 9/17   Total Weekly Dose 17.5mg 18.75mg 18.75mg 20mg 22.5mg 20mg 20mg 20mg 22.5mg 22.5mg   INR 1.9 1.7 2.0 1.5 1.8 2.1 2.1 1.5 1.7 1.5   Notes     boost boost   boost      Date 9/24 10/1 10/15 10/22 10/29 11/5 11/19 11/26 12/10 12/24 12/31   Total Weekly Dose 23.75  mg 22.5mg 22.5mg 18.75mg 22.5mg 23.75mg 23.75mg 23.75mg 23.75mg 22.5mg 23.75mg   INR 2.1 2.1 2.7 1.8 1.9 2.1 2.4 2.5 2.2 1.9 2.3   Notes boost   missed 10/15 x 1  1x incr dose    Mis-dose      Date 1/14/19 1/28 2/11 2/22 2/28 3/7 3/20 3/27 4/10 4/24 5/1   Total Weekly Dose 23.75  mg 23.75  mg 23.75  mg 23.75  mg 23.75  mg 23.75  mg 23.75  mg 23.75  mg 23.75  mg 23.75  mg 23.75  mg   INR 2.3 2.0 2.1 2.4 2.8 2.4 2.3 2.8 2.9 1.8 1.9   Notes  1 miss     fall         Date 5/8 5/15 5/22 6/5  6/19 6/26 7/3 7/8 7/15 7/22 8/5   Total  Weekly Dose 25mg 23.75  mg 23.75  mg 23.75  mg 23.75  mg 25 mg 23.75 mg 25mg 25mg 25mg 25mg   INR 2.2 2.6 2.5 2.1  2.0 2.6 1.5 2.0 2.2 2.1 1.8   Notes     inc GLV at [place]  missed dose bruising        Date 8/15            Total Weekly Dose 25mg            INR 3.2            Notes               Phone Interview:  Tablet Strength: 5mg tablet  Patient Contact Info: Mikayla Chaves, Cell (daughter) [phone]  The [place] Contact: Christiana ([phone]) *preferred*    Patient Findings   Negatives:  Signs/symptoms of thrombosis, Signs/symptoms of bleeding, Laboratory test error suspected, Change in health, Change in alcohol use, Change in activity, Upcoming invasive procedure, Emergency department visit, Upcoming dental procedure, Missed doses, Extra doses, Change in medications, Change in diet/appetite, Hospital admission, Bruising, Other complaints   Comments:  Patients nurse Christiana reports Mrs. Spencer bruise, beneath her hairline, ~1 month ago has healed completely.      Plan:  1. INR is slightly SUPRAtherapeutic today. Instructed Christiana to decrease Mrs. Spencer warfarin dose to 2.5mg tonight and then continue warfarin 3.75mg daily except 2.5mg on Saturdays.  2. Repeat INR in 1.5 weeks to ensure WNL, 8/26.   3. Verbal information provided over the phone. Adelaide Deutsch's RN, Christiana, RBV dosing instructions, expresses understanding by teach back, and has no further questions at this time.    Connor Arango, PharmD Candidate 2020  08/15/2019  9:52 AM    I, Rhonda Singh, PharmD, have reviewed the note in full and agree with the assessment and plan.  08/15/19  5:07 PM

## 2019-08-26 ENCOUNTER — ANTICOAGULATION VISIT (OUTPATIENT)
Dept: PHARMACY | Facility: HOSPITAL | Age: 84
End: 2019-08-26

## 2019-08-26 DIAGNOSIS — I48.20 CHRONIC ATRIAL FIBRILLATION (HCC): ICD-10-CM

## 2019-08-26 LAB — INR PPP: 2.6

## 2019-08-26 NOTE — PROGRESS NOTES
Anticoagulation Clinic - Remote Progress Note  [facility] [employer] [facility]  Frequency of Monitorin days    Indication: chronic afib  Referring Provider: Luiza  Initial Warfarin Start Date:   Goal INR: 2.0-3.0  Current Drug Interactions: Omeprazole, MVI, CoQ10, Levothyroxine  CHADS-VASc: 4 [HTN, Age>75, Female]  HAS-BLED: 2 (HTN, Elderly)  Clinic: 2018    Diet: Green beans, glenroy slaw once week, iceberg salad ~2x a week (19)  Alcohol: None  Tobacco: None  OTC Pain Medication: Tylenol    INR History:  Date 12/11 12/20 1/3/18 1/18 1/31 2/14 2/22 2/28 3/7 3/23 4/2 4/11 4/25   Total Weekly Dose 27.5 mg 27.5 mg 27.5  mg 27.5 mg   27.5mg 27.5 mg 25mg 27.5mg 27.5  mg 27.5 mg 27.5mg 27.5 mg 27.5  mg   INR 2.7 3.0 3.0 2.7 2.5 3.5 2.2 2.9 2.4 3.1 2.2 2.9 2.8   Notes       Held 1x    1 decr dose       Date 5/10 5/17 5/23 6/6 6/13 6/20 6/22 6/27 7/1 7/5   Total Weekly Dose 25mg 27.5mg 27.5mg 26.25mg 27.5mg 25mg 20mg 20mg 20mg 17.5mg   INR 1.9 2.3 2.4 3.1 3.8; 4.0 3.9 2.4 3.2 2.1 2.6   Notes missed 1x dose, self adj   Less GLV; daughter [facility]; Less GLV    More GLV; dose decr.      Date 7/11 7/18 7/25 8/1 8/7 8/15 8/22 9/5 9/10 9/17   Total Weekly Dose 17.5mg 18.75mg 18.75mg 20mg 22.5mg 20mg 20mg 20mg 22.5mg 22.5mg   INR 1.9 1.7 2.0 1.5 1.8 2.1 2.1 1.5 1.7 1.5   Notes     boost boost   boost      Date 9/24 10/1 10/15 10/22 10/29 11/5 11/19 11/26 12/10 12/24 12/31   Total Weekly Dose 23.75  mg 22.5mg 22.5mg 18.75mg 22.5mg 23.75mg 23.75mg 23.75mg 23.75mg 22.5mg 23.75mg   INR 2.1 2.1 2.7 1.8 1.9 2.1 2.4 2.5 2.2 1.9 2.3   Notes boost   missed 10/15 x 1  1x incr dose    Mis-dose      Date 1/14/19 1/28 2/11 2/22 2/28 3/7 3/20 3/27 4/10 4/24 5/1   Total Weekly Dose 23.75  mg 23.75  mg 23.75  mg 23.75  mg 23.75  mg 23.75  mg 23.75  mg 23.75  mg 23.75  mg 23.75  mg 23.75  mg   INR 2.3 2.0 2.1 2.4 2.8 2.4 2.3 2.8 2.9 1.8 1.9   Notes  1 miss     fall         Date 5/8 5/15 5/22 6/5  6/19 6/26 7/3 7/8 7/15 7/22 8/5   Total  Weekly Dose 25mg 23.75  mg 23.75  mg 23.75  mg 23.75  mg 25 mg 23.75 mg 25mg 25mg 25mg 25mg   INR 2.2 2.6 2.5 2.1  2.0 2.6 1.5 2.0 2.2 2.1 1.8   Notes     inc GLV at [place]  missed dose bruising        Date 8/15 8/26           Total Weekly Dose 25mg            INR 3.2 2.6           Notes               Phone Interview:  Tablet Strength: 5mg tablet  Patient Contact Info: Mikalya Chaves, Cell (daughter) [phone]  [facility] [place] Contact: Christiana ([phone]) *preferred*    Patient Findings   Negatives:  Signs/symptoms of thrombosis, Signs/symptoms of bleeding, Laboratory test error suspected, Change in health, Change in alcohol use, Change in activity, Upcoming invasive procedure, Emergency department visit, Upcoming dental procedure, Missed doses, Extra doses, Change in medications, Change in diet/appetite, Hospital admission, Bruising, Other complaints     Plan:  1. INR is therapeutic. Instructed Christiana to continue warfarin 3.75mg daily except 2.5mg on Saturdays. Of note: if patient becomes SUPRatherapeutic again, she was previously therapeutic on 2.5mg twice weekly prior to 7/2019. May consider dose decrease back to this.  2. Repeat INR on 9/3.    3. Verbal information provided over the phone. Adelaide Deutsch's RN, Christiana, RBV dosing instructions, expresses understanding by teach back, and has no further questions at this time.    Rhonda Singh, PharmD  08/26/2019  9:56 AM

## 2019-09-03 ENCOUNTER — ANTICOAGULATION VISIT (OUTPATIENT)
Dept: PHARMACY | Facility: HOSPITAL | Age: 84
End: 2019-09-03

## 2019-09-03 DIAGNOSIS — I48.20 CHRONIC ATRIAL FIBRILLATION (HCC): ICD-10-CM

## 2019-09-03 LAB — INR PPP: 2.6

## 2019-09-03 NOTE — PROGRESS NOTES
Anticoagulation Clinic - Remote Progress Note  [facility] [employer]  Frequency of Monitorin-4x a a month    Indication: chronic afib  Referring Provider: Luiza  Initial Warfarin Start Date:   Goal INR: 2.0-3.0  Current Drug Interactions: Omeprazole, MVI, CoQ10, Levothyroxine  CHADS-VASc: 4 [HTN, Age>75, Female]  HAS-BLED: 2 (HTN, Elderly)  Clinic: 2018    Diet: Green beans, glenroy slaw once week, iceberg salad ~2x a week (19)  Alcohol: None  Tobacco: None  OTC Pain Medication: Tylenol    INR History:  Date 12/11 12/20 1/3/18 1/18 1/31 2/14 2/22 2/28 3/7 3/23 4/2 4/11 4/25   Total Weekly Dose 27.5 mg 27.5 mg 27.5  mg 27.5 mg   27.5mg 27.5 mg 25mg 27.5mg 27.5  mg 27.5 mg 27.5mg 27.5 mg 27.5  mg   INR 2.7 3.0 3.0 2.7 2.5 3.5 2.2 2.9 2.4 3.1 2.2 2.9 2.8   Notes       Held 1x    1 decr dose       Date 5/10 5/17 5/23 6/6 6/13 6/20 6/22 6/27 7/1 7/5   Total Weekly Dose 25mg 27.5mg 27.5mg 26.25mg 27.5mg 25mg 20mg 20mg 20mg 17.5mg   INR 1.9 2.3 2.4 3.1 3.8; 4.0 3.9 2.4 3.2 2.1 2.6   Notes missed 1x dose, self adj   Less GLV; daughter [place] [facility]; Less GLV    More GLV; dose decr.      Date 7/11 7/18 7/25 8/1 8/7 8/15 8/22 9/5 9/10 9/17   Total Weekly Dose 17.5mg 18.75mg 18.75mg 20mg 22.5mg 20mg 20mg 20mg 22.5mg 22.5mg   INR 1.9 1.7 2.0 1.5 1.8 2.1 2.1 1.5 1.7 1.5   Notes     boost boost   boost      Date 9/24 10/1 10/15 10/22 10/29 11/5 11/19 11/26 12/10 12/24 12/31   Total Weekly Dose 23.75  mg 22.5mg 22.5mg 18.75mg 22.5mg 23.75mg 23.75mg 23.75mg 23.75mg 22.5mg 23.75mg   INR 2.1 2.1 2.7 1.8 1.9 2.1 2.4 2.5 2.2 1.9 2.3   Notes boost   missed 10/15 x 1  1x incr dose    Mis-dose      Date 1/14/19 1/28 2/11 2/22 2/28 3/7 3/20 3/27 4/10 4/24 5/1   Total Weekly Dose 23.75  mg 23.75  mg 23.75  mg 23.75  mg 23.75  mg 23.75  mg 23.75  mg 23.75  mg 23.75  mg 23.75  mg 23.75  mg   INR 2.3 2.0 2.1 2.4 2.8 2.4 2.3 2.8 2.9 1.8 1.9   Notes  1 miss     fall         Date 5/8 5/15 5/22 6/5  6/19 6/26 7/3 7/8 7/15 7/22 8/5    Total Weekly Dose 25mg 23.75  mg 23.75  mg 23.75  mg 23.75  mg 25 mg 23.75mg 25mg 25mg 25mg 25mg   INR 2.2 2.6 2.5 2.1  2.0 2.6 1.5 2.0 2.2 2.1 1.8   Notes     incr GLV at [place]  missed dose bruising        Date 8/15 8/26 9/3          Total Weekly Dose 25mg 25mg 25mg          INR 3.2 2.6 2.6          Notes               Phone Interview:  Tablet Strength: 5mg tablet  Patient Contact Info: Mikayla Chaves, Cell (daughter) [phone]  [facility] [place] Contact: Christiana ([phone]) *preferred*    Patient Findings   Negatives:  Signs/symptoms of thrombosis, Signs/symptoms of bleeding, Laboratory test error suspected, Change in health, Change in alcohol use, Change in activity, Upcoming invasive procedure, Emergency department visit, Upcoming dental procedure, Missed doses, Extra doses, Change in medications, Change in diet/appetite, Hospital admission, Bruising, Other complaints     Plan:  1. INR is therapeutic today at 2.6. Instructed Christiana to have patient continue warfarin 3.75mg daily except 2.5mg on Saturdays. Of note: if patient becomes SUPRatherapeutic again, she was previously therapeutic on 2.5mg twice weekly prior to 7/2019. May consider dose decrease back to this.  2. Repeat INR in 1 week, 9/10. If patient is therapeutic at next encounter, will consider having patient check in q2 weeks.  3. Verbal information provided over the phone. Adelaide Deutsch's RN, Christiana, RBV dosing instructions, expresses understanding by teach back, and has no further questions at this time.    Loc[place] Valle, Kendall  9/3/2019  10:10 AM    I, Rhonda Singh, PharmD, have reviewed the note in full and agree with the assessment and plan.  09/03/19  5:52 PM

## 2019-09-10 ENCOUNTER — ANTICOAGULATION VISIT (OUTPATIENT)
Dept: PHARMACY | Facility: HOSPITAL | Age: 84
End: 2019-09-10

## 2019-09-10 DIAGNOSIS — I48.20 CHRONIC ATRIAL FIBRILLATION (HCC): ICD-10-CM

## 2019-09-10 LAB — INR PPP: 2.4

## 2019-09-10 NOTE — PROGRESS NOTES
Anticoagulation Clinic - Remote Progress Note  [facility] [employer]  Frequency of Monitorin-4x a a month    Indication: chronic afib  Referring Provider: Luiza  Initial Warfarin Start Date:   Goal INR: 2.0-3.0  Current Drug Interactions: Omeprazole, MVI, CoQ10, Levothyroxine  CHADS-VASc: 4 [HTN, Age>75, Female]  HAS-BLED: 2 (HTN, Elderly)  Clinic: 2018    Diet: Green beans, glenroy slaw once week, iceberg salad ~2x a week (19)  Alcohol: None  Tobacco: None  OTC Pain Medication: Tylenol    INR History:  Date 12/11 12/20 1/3/18 1/18 1/31 2/14 2/22 2/28 3/7 3/23 4/2 4/11 4/25   Total Weekly Dose 27.5 mg 27.5 mg 27.5  mg 27.5 mg   27.5mg 27.5 mg 25mg 27.5mg 27.5  mg 27.5 mg 27.5mg 27.5 mg 27.5  mg   INR 2.7 3.0 3.0 2.7 2.5 3.5 2.2 2.9 2.4 3.1 2.2 2.9 2.8   Notes       Held 1x    1 decr dose       Date 5/10 5/17 5/23 6/6 6/13 6/20 6/22 6/27 7/1 7/5   Total Weekly Dose 25mg 27.5mg 27.5mg 26.25mg 27.5mg 25mg 20mg 20mg 20mg 17.5mg   INR 1.9 2.3 2.4 3.1 3.8; 4.0 3.9 2.4 3.2 2.1 2.6   Notes missed 1x dose, self adj   Less GLV; daughter [place] [facility]; Less GLV    More GLV; dose decr.      Date 7/11 7/18 7/25 8/1 8/7 8/15 8/22 9/5 9/10 9/17   Total Weekly Dose 17.5mg 18.75mg 18.75mg 20mg 22.5mg 20mg 20mg 20mg 22.5mg 22.5mg   INR 1.9 1.7 2.0 1.5 1.8 2.1 2.1 1.5 1.7 1.5   Notes     boost boost   boost      Date 9/24 10/1 10/15 10/22 10/29 11/5 11/19 11/26 12/10 12/24 12/31   Total Weekly Dose 23.75  mg 22.5mg 22.5mg 18.75mg 22.5mg 23.75mg 23.75mg 23.75mg 23.75mg 22.5mg 23.75mg   INR 2.1 2.1 2.7 1.8 1.9 2.1 2.4 2.5 2.2 1.9 2.3   Notes boost   missed 10/15 x 1  1x incr dose    Mis-dose      Date 1/14/19 1/28 2/11 2/22 2/28 3/7 3/20 3/27 4/10 4/24 5/1   Total Weekly Dose 23.75  mg 23.75  mg 23.75  mg 23.75  mg 23.75  mg 23.75  mg 23.75  mg 23.75  mg 23.75  mg 23.75  mg 23.75  mg   INR 2.3 2.0 2.1 2.4 2.8 2.4 2.3 2.8 2.9 1.8 1.9   Notes  1 miss     fall         Date 5/8 5/15 5/22 6/5  6/19 6/26 7/3 7/8 7/15 7/22 8/5    Total Weekly Dose 25mg 23.75  mg 23.75  mg 23.75  mg 23.75  mg 25 mg 23.75mg 25mg 25mg 25mg 25mg   INR 2.2 2.6 2.5 2.1  2.0 2.6 1.5 2.0 2.2 2.1 1.8   Notes     incr GLV at [place]  missed dose bruising        Date 8/15 8/26 9/3 9/10         Total Weekly Dose 25mg 25mg 25mg 25mg         INR 3.2 2.6 2.6 2.4         Notes               Phone Interview:  Tablet Strength: 5mg tablet  Patient Contact Info: Mikayla Chaves, Cell (daughter) [phone]  [facility] [place] Contact: Christiana ([phone]) *preferred*    Patient Findings   Negatives:  Signs/symptoms of thrombosis, Signs/symptoms of bleeding, Laboratory test error suspected, Change in health, Change in alcohol use, Change in activity, Upcoming invasive procedure, Emergency department visit, Upcoming dental procedure, Missed doses, Extra doses, Change in medications, Change in diet/appetite, Hospital admission, Bruising, Other complaints   Comments:  RN denies any changes since the patients last encounter and confirmed patients current dosing regimen. Instructed RN to contact us of any patient changes due to pushing out patients follow-up date to 2 weeks.     Plan:  1. INR is therapeutic today at 2.4. Instructed RN to have patient continue warfarin 3.75mg daily except 2.5mg on Saturdays. Of note: if patient becomes SUPRatherapeutic again, she was previously therapeutic on 2.5mg twice weekly prior to 7/2019. May consider dose decrease back to this.  2. Repeat INR in 2 weeks, 9/24.   3. Verbal information provided over the phone. Adelaide Deutsch's RN who RBV dosing instructions, expresses understanding by teach back, and has no further questions at this time.    Connor Arango, PharmD Candidate 2020  09/10/2019  2:21 PM    Erinn SHIN [facility], have reviewed the note in full and agree with the assessment and plan.  09/10/19  4:40 PM

## 2019-09-24 ENCOUNTER — ANTICOAGULATION VISIT (OUTPATIENT)
Dept: PHARMACY | Facility: HOSPITAL | Age: 84
End: 2019-09-24

## 2019-09-24 DIAGNOSIS — I48.20 CHRONIC ATRIAL FIBRILLATION (HCC): ICD-10-CM

## 2019-09-24 LAB — INR PPP: 1.6

## 2019-09-24 NOTE — PROGRESS NOTES
Anticoagulation Clinic - Remote Progress Note  [employer] [employer]  Frequency of Monitorin-4x a a month    Indication: chronic afib  Referring Provider: Luiza  Initial Warfarin Start Date:   Goal INR: 2.0-3.0  Current Drug Interactions: Omeprazole, MVI, CoQ10, Levothyroxine  CHADS-VASc: 4 [HTN, Age>75, Female]  HAS-BLED: 2 (HTN, Elderly)  Clinic: 2018    Diet: Green beans, glenroy slaw once week, iceberg salad ~2x a week (19)  Alcohol: None  Tobacco: None  OTC Pain Medication: Tylenol    INR History:  Date 12/11 12/20 1/3/18 1/18 1/31 2/14 2/22 2/28 3/7 3/23 4/2 4/11 4/25   Total Weekly Dose 27.5 mg 27.5 mg 27.5  mg 27.5 mg   27.5mg 27.5 mg 25mg 27.5mg 27.5  mg 27.5 mg 27.5mg 27.5 mg 27.5  mg   INR 2.7 3.0 3.0 2.7 2.5 3.5 2.2 2.9 2.4 3.1 2.2 2.9 2.8   Notes       Held 1x    1 decr dose       Date 5/10 5/17 5/23 6/6 6/13 6/20 6/22 6/27 7/1 7/5   Total Weekly Dose 25mg 27.5mg 27.5mg 26.25mg 27.5mg 25mg 20mg 20mg 20mg 17.5mg   INR 1.9 2.3 2.4 3.1 3.8; 4.0 3.9 2.4 3.2 2.1 2.6   Notes missed 1x dose, self adj   Less GLV; daughter [place] clinic; Less GLV    More GLV; dose decr.      Date 7/11 7/18 7/25 8/1 8/7 8/15 8/22 9/5 9/10 9/17   Total Weekly Dose 17.5mg 18.75mg 18.75mg 20mg 22.5mg 20mg 20mg 20mg 22.5mg 22.5mg   INR 1.9 1.7 2.0 1.5 1.8 2.1 2.1 1.5 1.7 1.5   Notes     boost boost   boost      Date 9/24 10/1 10/15 10/22 10/29 11/5 11/19 11/26 12/10 12/24 12/31   Total Weekly Dose 23.75  mg 22.5mg 22.5mg 18.75mg 22.5mg 23.75mg 23.75mg 23.75mg 23.75mg 22.5mg 23.75mg   INR 2.1 2.1 2.7 1.8 1.9 2.1 2.4 2.5 2.2 1.9 2.3   Notes boost   missed 10/15 x 1  1x incr dose    Mis-dose      Date 1/14/19 1/28 2/11 2/22 2/28 3/7 3/20 3/27 4/10 4/24 5/1   Total Weekly Dose 23.75  mg 23.75  mg 23.75  mg 23.75  mg 23.75  mg 23.75  mg 23.75  mg 23.75  mg 23.75  mg 23.75  mg 23.75  mg   INR 2.3 2.0 2.1 2.4 2.8 2.4 2.3 2.8 2.9 1.8 1.9   Notes  1 miss     fall         Date 5/8 5/15 5/22 6/5  6/19 6/26 7/3 7/8 7/15 7/22 8/5    Total Weekly Dose 25mg 23.75  mg 23.75  mg 23.75  mg 23.75  mg 25 mg 23.75mg 25mg 25mg 25mg 25mg   INR 2.2 2.6 2.5 2.1  2.0 2.6 1.5 2.0 2.2 2.1 1.8   Notes     incr GLV at [place]  missed dose bruising        Date 8/15 8/26 9/3 9/10 9/24        Total Weekly Dose 25mg 25mg 25mg 25mg 25mg        INR 3.2 2.6 2.6 2.4 1.6        Notes               Phone Interview:  Tablet Strength: 5mg tablet  Patient Contact Info: Mikayla Chaves, Cell (daughter) [phone]  [facility] [place] Contact: Christiana ([phone]) *preferred*    Patient Findings     Negatives:  Signs/symptoms of thrombosis, Signs/symptoms of bleeding, Laboratory test error suspected, Change in health, Change in alcohol use, Change in activity, Upcoming invasive procedure, Emergency department visit, Upcoming dental procedure, Missed doses, Extra doses, Change in medications, Change in diet/appetite, Hospital admission, Bruising, Other complaints   Comments:  Per nurse Christiana, verified no missed doses         Plan:  1. INR is subtherapeutic today at 1.6. Instructed RN to have patient boost tonight's dose to 5mg then  continue warfarin 3.75mg daily except 2.5mg on Saturdays.  2. Repeat INR in 1 week, then can return to q2w if WNL.   3. Verbal information provided over the phone. Adelaide Deutsch's RN who RBV dosing instructions, expresses understanding by teach back, and has no further questions at this time.    Erinn Aldrich, PharmD.  09/24/19   2:10 PM

## 2019-10-01 NOTE — PROGRESS NOTES
Anticoagulation Clinic - Remote Progress Note  [employer] [employer]  Frequency of Monitorin-4x a a month    Indication: chronic afib  Referring Provider: Luiza  Initial Warfarin Start Date:   Goal INR: 2.0-3.0  Current Drug Interactions: Omeprazole, MVI, CoQ10, Levothyroxine  CHADS-VASc: 4 [HTN, Age>75, Female]  HAS-BLED: 2 (HTN, Elderly)  Clinic: 2018    Diet: Green beans, glenroy slaw once week, iceberg salad ~2x a week (19)  Alcohol: None  Tobacco: None  OTC Pain Medication: Tylenol    INR History:  Date 12/11 12/20 1/3/18 1/18 1/31 2/14 2/22 2/28 3/7 3/23 4/2 4/11 4/25   Total Weekly Dose 27.5 mg 27.5 mg 27.5  mg 27.5 mg   27.5mg 27.5 mg 25mg 27.5mg 27.5  mg 27.5 mg 27.5mg 27.5 mg 27.5  mg   INR 2.7 3.0 3.0 2.7 2.5 3.5 2.2 2.9 2.4 3.1 2.2 2.9 2.8   Notes       Held 1x    1 decr dose       Date 5/10 5/17 5/23 6/6 6/13 6/20 6/22 6/27 7/1 7/5   Total Weekly Dose 25mg 27.5mg 27.5mg 26.25mg 27.5mg 25mg 20mg 20mg 20mg 17.5mg   INR 1.9 2.3 2.4 3.1 3.8; 4.0 3.9 2.4 3.2 2.1 2.6   Notes missed 1x dose, self adj   Less GLV; daughter [place] clinic; Less GLV    More GLV; dose decr.      Date 7/11 7/18 7/25 8/1 8/7 8/15 8/22 9/5 9/10 9/17   Total Weekly Dose 17.5mg 18.75mg 18.75mg 20mg 22.5mg 20mg 20mg 20mg 22.5mg 22.5mg   INR 1.9 1.7 2.0 1.5 1.8 2.1 2.1 1.5 1.7 1.5   Notes     boost boost   boost      Date 9/24 10/1 10/15 10/22 10/29 11/5 11/19 11/26 12/10 12/24 12/31   Total Weekly Dose 23.75  mg 22.5mg 22.5mg 18.75mg 22.5mg 23.75mg 23.75mg 23.75mg 23.75mg 22.5mg 23.75mg   INR 2.1 2.1 2.7 1.8 1.9 2.1 2.4 2.5 2.2 1.9 2.3   Notes boost   missed 10/15 x 1  1x incr dose    Mis-dose      Date 1/14/19 1/28 2/11 2/22 2/28 3/7 3/20 3/27 4/10 4/24 5/1   Total Weekly Dose 23.75  mg 23.75  mg 23.75  mg 23.75  mg 23.75  mg 23.75  mg 23.75  mg 23.75  mg 23.75  mg 23.75  mg 23.75  mg   INR 2.3 2.0 2.1 2.4 2.8 2.4 2.3 2.8 2.9 1.8 1.9   Notes  1 miss     fall         Date 5/8 5/15 5/22 6/5  6/19 6/26 7/3 7/8 7/15 7/22 8/5    Total Weekly Dose 25mg 23.75  mg 23.75  mg 23.75  mg 23.75  mg 25 mg 23.75mg 25mg 25mg 25mg 25mg   INR 2.2 2.6 2.5 2.1  2.0 2.6 1.5 2.0 2.2 2.1 1.8   Notes     incr GLV at [place]  missed dose bruising        Date 8/15 8/26 9/3 9/10 9/24 10/1       Total Weekly Dose 25mg 25mg 25mg 25mg 25mg 26.25  mg 25mg      INR 3.2 2.6 2.6 2.4 1.6 2.3       Notes      1x boost         Phone Interview:  Tablet Strength: 5mg tablet  Patient Contact Info: Mikayla Chaves, Cell (daughter) [phone]  [facility] in [place] Contact: Christiana ([phone]) *preferred*    Patient Findings   Negatives:  Signs/symptoms of thrombosis, Signs/symptoms of bleeding, Laboratory test error suspected, Change in health, Change in alcohol use, Change in activity, Upcoming invasive procedure, Emergency department visit, Upcoming dental procedure, Missed doses, Extra doses, Change in medications, Change in diet/appetite, Hospital admission, Bruising, Other complaints   Comments:  RN Jeffry denies all findings.     Plan:  1. INR is therapeutic and back WNL today at 2.3. Instructed RN to have patient continue warfarin 3.75mg daily except 2.5mg on Saturdays.  2. Repeat INR in 1 week, 10/8, to ensure patient stays WNL. If therapeutic at next encounter will consider having patient return to q2 week checks.  3. Verbal information provided over the phone. Adelaide Deutsch's RN who RBV dosing instructions, expresses understanding by teach back, and has no further questions at this time.    Loco Valle, Kendall  10/1/2019  1:00 PM    IEugenio, [facility], have reviewed the note in full and agree with the assessment and plan.  10/01/19  4:50 PM

## 2019-10-08 NOTE — PROGRESS NOTES
Anticoagulation Clinic - Remote Progress Note  [employer]  Frequency of Monitorin-4x a a month    Indication: chronic afib  Referring Provider: Luiza  Initial Warfarin Start Date:   Goal INR: 2.0-3.0  Current Drug Interactions: Omeprazole, MVI, CoQ10, Levothyroxine  CHADS-VASc: 4 [HTN, Age>75, Female]  HAS-BLED: 2 (HTN, Elderly)  Clinic: 2018    Diet: Green beans, glenroy slaw once week, iceberg salad ~2x a week (19)  Alcohol: None  Tobacco: None  OTC Pain Medication: Tylenol    INR History:  Date 12/11 12/20 1/3/18 1/18 1/31 2/14 2/22 2/28 3/7 3/23 4/2 4/11 4/25   Total Weekly Dose 27.5 mg 27.5 mg 27.5  mg 27.5 mg   27.5mg 27.5 mg 25mg 27.5mg 27.5  mg 27.5 mg 27.5mg 27.5 mg 27.5  mg   INR 2.7 3.0 3.0 2.7 2.5 3.5 2.2 2.9 2.4 3.1 2.2 2.9 2.8   Notes       Held 1x    1 decr dose       Date 5/10 5/17 5/23 6/6 6/13 6/20 6/22 6/27 7/1 7/5   Total Weekly Dose 25mg 27.5mg 27.5mg 26.25mg 27.5mg 25mg 20mg 20mg 20mg 17.5mg   INR 1.9 2.3 2.4 3.1 3.8; 4.0 3.9 2.4 3.2 2.1 2.6   Notes missed 1x dose, self adj   Less GLV; daughter [place] clinic; Less GLV    More GLV; dose decr.      Date 7/11 7/18 7/25 8/1 8/7 8/15 8/22 9/5 9/10 9/17   Total Weekly Dose 17.5mg 18.75mg 18.75mg 20mg 22.5mg 20mg 20mg 20mg 22.5mg 22.5mg   INR 1.9 1.7 2.0 1.5 1.8 2.1 2.1 1.5 1.7 1.5   Notes     boost boost   boost      Date 9/24 10/1 10/15 10/22 10/29 11/5 11/19 11/26 12/10 12/24 12/31   Total Weekly Dose 23.75  mg 22.5mg 22.5mg 18.75mg 22.5mg 23.75mg 23.75mg 23.75mg 23.75mg 22.5mg 23.75mg   INR 2.1 2.1 2.7 1.8 1.9 2.1 2.4 2.5 2.2 1.9 2.3   Notes boost   missed 10/15 x 1  1x incr dose    Mis-dose      Date 1/14/19 1/28 2/11 2/22 2/28 3/7 3/20 3/27 4/10 4/24 5/1   Total Weekly Dose 23.75  mg 23.75  mg 23.75  mg 23.75  mg 23.75  mg 23.75  mg 23.75  mg 23.75  mg 23.75  mg 23.75  mg 23.75  mg   INR 2.3 2.0 2.1 2.4 2.8 2.4 2.3 2.8 2.9 1.8 1.9   Notes  1 miss     fall         Date 5/8 5/15 5/22 6/5  6/19 6/26 7/3 7/8 7/15 7/22 8/5    Total Weekly Dose 25mg 23.75  mg 23.75  mg 23.75  mg 23.75  mg 25 mg 23.75mg 25mg 25mg 25mg 25mg   INR 2.2 2.6 2.5 2.1  2.0 2.6 1.5 2.0 2.2 2.1 1.8   Notes     incr GLV at [place]  missed dose bruising        Date 8/15 8/26 9/3 9/10 9/24 10/1 10/8      Total Weekly Dose 25mg 25mg 25mg 25mg 25mg 26.25  mg 25mg      INR 3.2 2.6 2.6 2.4 1.6 2.3 4.8      Notes      1x boost         Phone Interview:  Tablet Strength: 2.5mg tablet  Patient Contact Info: Mikayla Chaves, Cell (daughter) [phone]  [facility] in [place] Contact: Christiana ([phone]) *preferred*    Patient Findings   Negatives:  Signs/symptoms of thrombosis, Signs/symptoms of bleeding, Laboratory test error suspected, Change in health, Change in alcohol use, Change in activity, Upcoming invasive procedure, Emergency department visit, Upcoming dental procedure, Missed doses, Extra doses, Change in medications, Change in diet/appetite, Hospital admission, Bruising, Other complaints   Comments:  Pt's RN nurse reports no changes; [facility] controls diet and all medications.          Plan:  1. INR is SUPRAtherapeutic today at 4.8 per POCT.  Instructed RN to have patient HOLD tonight's dose.  She will attempt to have venipuncture drawn today but will have drawn in the morning if not.  She will retest via [employer] again tomorrow.  We will evaluate dosing needs again tomorrow (10/9).  2. Repeat INR tomorrow, 10/9.   3. Verbal information provided over the phone. Adelaide Deutsch's RN [facility] dosing instructions, expresses understanding by teach back, and has no further questions at this time.  4.  Will fax venipuncture orders to [facility] ([phone]).    Sanam Rojas, Pharmacy Intern  10/8/2019  10:37 AM    I, Reena Moore, PharmD, have reviewed the note in full and agree with the assessment and plan.  10/08/19  11:58 AM

## 2019-10-09 NOTE — PROGRESS NOTES
Anticoagulation Clinic - Remote Progress Note  [facility]  Frequency of Monitorin-4x a a month    Indication: chronic afib  Referring Provider: Luiza  Initial Warfarin Start Date:   Goal INR: 2.0-3.0  Current Drug Interactions: Omeprazole, MVI, CoQ10, Levothyroxine  CHADS-VASc: 4 [HTN, Age>75, Female]  HAS-BLED: 2 (HTN, Elderly)  Clinic: 2018    Diet: Green beans, glenroy slaw once week, iceberg salad ~2x a week (19)  Alcohol: None  Tobacco: None  OTC Pain Medication: Tylenol    INR History:  Date 12/11 12/20 1/3/18 1/18 1/31 2/14 2/22 2/28 3/7 3/23 4/2 4/11 4/25   Total Weekly Dose 27.5 mg 27.5 mg 27.5  mg 27.5 mg   27.5mg 27.5 mg 25mg 27.5mg 27.5  mg 27.5 mg 27.5mg 27.5 mg 27.5  mg   INR 2.7 3.0 3.0 2.7 2.5 3.5 2.2 2.9 2.4 3.1 2.2 2.9 2.8   Notes       Held 1x    1 decr dose       Date 5/10 5/17 5/23 6/6 6/13 6/20 6/22 6/27 7/1 7/5   Total Weekly Dose 25mg 27.5mg 27.5mg 26.25mg 27.5mg 25mg 20mg 20mg 20mg 17.5mg   INR 1.9 2.3 2.4 3.1 3.8; 4.0 3.9 2.4 3.2 2.1 2.6   Notes missed 1x dose, self adj   Less GLV; daughter [facility]; Less GLV    More GLV; dose decr.      Date 7/11 7/18 7/25 8/1 8/7 8/15 8/22 9/5 9/10 9/17   Total Weekly Dose 17.5mg 18.75mg 18.75mg 20mg 22.5mg 20mg 20mg 20mg 22.5mg 22.5mg   INR 1.9 1.7 2.0 1.5 1.8 2.1 2.1 1.5 1.7 1.5   Notes     boost boost   boost      Date 9/24 10/1 10/15 10/22 10/29 11/5 11/19 11/26 12/10 12/24 12/31   Total Weekly Dose 23.75  mg 22.5mg 22.5mg 18.75mg 22.5mg 23.75mg 23.75mg 23.75mg 23.75mg 22.5mg 23.75mg   INR 2.1 2.1 2.7 1.8 1.9 2.1 2.4 2.5 2.2 1.9 2.3   Notes boost   missed 10/15 x 1  1x incr dose    Mis-dose      Date 1/14/19 1/28 2/11 2/22 2/28 3/7 3/20 3/27 4/10 4/24 5/1   Total Weekly Dose 23.75  mg 23.75  mg 23.75  mg 23.75  mg 23.75  mg 23.75  mg 23.75  mg 23.75  mg 23.75  mg 23.75  mg 23.75  mg   INR 2.3 2.0 2.1 2.4 2.8 2.4 2.3 2.8 2.9 1.8 1.9   Notes  1 miss     fall         Date 5/8 5/15 5/22 6/5  6/19 6/26 7/3 7/8 7/15 7/22 8/5    Total Weekly Dose 25mg 23.75  mg 23.75  mg 23.75  mg 23.75  mg 25 mg 23.75mg 25mg 25mg 25mg 25mg   INR 2.2 2.6 2.5 2.1  2.0 2.6 1.5 2.0 2.2 2.1 1.8   Notes     incr GLV at [facility]  missed dose bruising        Date 8/15 8/26 9/3 9/10 9/24 10/1 10/8  10/8     Total Weekly Dose 25mg 25mg 25mg 25mg 25mg 26.25  mg 25mg      INR 3.2 2.6 2.6 2.4 1.6 2.3 4.8 (POCT)  3.75 (ryan)      Notes      1x boost         Phone Interview:  Tablet Strength: 2.5mg tablet  Patient Contact Info: Mikayla Chaves, Cell (daughter) [phone]  [facility] in [place] Contact: Christiana ([phone]) *preferred*         Plan:  1. INR via venipuncture yesterday afternoon was SUPRAtherapeutic at 3.75 (received today), although better than the POCT at 4.8. MARIANO Villeda had been instructed to HOLD dose 10/8.   Today, instructed Christiana to have Ms. Deutsch resume warfarin 3.75mg oral daily except 2.5mg on Saturdays until recheck.  2. Repeat INR in one week on 10/15  3. Verbal information provided over the phone. Adelaide Deutsch's RN RBV dosing instructions, expresses understanding by teach back, and has no further questions at this time.      Reena Moore, PharmD  10/9/2019  8:01 AM

## 2019-10-15 NOTE — PROGRESS NOTES
Anticoagulation Clinic - Remote Progress Note  [facility] [employer]  Frequency of Monitorin-4x a a month    Indication: chronic afib  Referring Provider: Luiza  Initial Warfarin Start Date:   Goal INR: 2.0-3.0  Current Drug Interactions: Omeprazole, MVI, CoQ10, Levothyroxine  CHADS-VASc: 4 [HTN, Age>75, Female]  HAS-BLED: 2 (HTN, Elderly)  Clinic: 2018    Diet: Green beans, glenroy slaw once week, iceberg salad ~2x a week (19)  Alcohol: None  Tobacco: None  OTC Pain Medication: Tylenol    INR History:  Date 12/11 12/20 1/3/18 1/18 1/31 2/14 2/22 2/28 3/7 3/23 4/2 4/11 4/25   Total Weekly Dose 27.5 mg 27.5 mg 27.5  mg 27.5 mg   27.5mg 27.5 mg 25mg 27.5mg 27.5  mg 27.5 mg 27.5mg 27.5 mg 27.5  mg   INR 2.7 3.0 3.0 2.7 2.5 3.5 2.2 2.9 2.4 3.1 2.2 2.9 2.8   Notes       Held 1x    1 decr dose       Date 5/10 5/17 5/23 6/6 6/13 6/20 6/22 6/27 7/1 7/5   Total Weekly Dose 25mg 27.5mg 27.5mg 26.25mg 27.5mg 25mg 20mg 20mg 20mg 17.5mg   INR 1.9 2.3 2.4 3.1 3.8; 4.0 3.9 2.4 3.2 2.1 2.6   Notes missed 1x dose, self adj   Less GLV; daughter [place] clinic; Less GLV    More GLV; dose decr.      Date 7/11 7/18 7/25 8/1 8/7 8/15 8/22 9/5 9/10 9/17   Total Weekly Dose 17.5mg 18.75mg 18.75mg 20mg 22.5mg 20mg 20mg 20mg 22.5mg 22.5mg   INR 1.9 1.7 2.0 1.5 1.8 2.1 2.1 1.5 1.7 1.5   Notes     boost boost   boost      Date 9/24 10/1 10/15 10/22 10/29 11/5 11/19 11/26 12/10 12/24 12/31   Total Weekly Dose 23.75  mg 22.5mg 22.5mg 18.75mg 22.5mg 23.75mg 23.75mg 23.75mg 23.75mg 22.5mg 23.75mg   INR 2.1 2.1 2.7 1.8 1.9 2.1 2.4 2.5 2.2 1.9 2.3   Notes boost   missed 10/15 x 1  1x incr dose    Mis-dose      Date 1/14/19 1/28 2/11 2/22 2/28 3/7 3/20 3/27 4/10 4/24 5/1   Total Weekly Dose 23.75  mg 23.75  mg 23.75  mg 23.75  mg 23.75  mg 23.75  mg 23.75  mg 23.75  mg 23.75  mg 23.75  mg 23.75  mg   INR 2.3 2.0 2.1 2.4 2.8 2.4 2.3 2.8 2.9 1.8 1.9   Notes  1 miss     fall         Date 5/8 5/15 5/22 6/5  6/19 6/26 7/3 7/8 7/15 7/22 8/5    Total Weekly Dose 25mg 23.75  mg 23.75  mg 23.75  mg 23.75  mg 25 mg 23.75mg 25mg 25mg 25mg 25mg   INR 2.2 2.6 2.5 2.1  2.0 2.6 1.5 2.0 2.2 2.1 1.8   Notes     incr GLV at [place]  missed dose bruising        Date 8/15 8/26 9/3 9/10 9/24 10/1 10/8  10/15     Total Weekly Dose 25mg 25mg 25mg 25mg 25mg 26.25  mg 25mg  21.25 mg     INR 3.2 2.6 2.6 2.4 1.6 2.3 4.8 (POCT)  3.75 (ryan)  4 (POCT)     Notes      1x boost         Phone Interview:  Tablet Strength: 2.5mg tablet  Patient Contact Info: Mikayla Chaves, Cell (daughter) [phone]  [facility] in [place] Contact: Christiana ([phone]) *preferred*    Patient Findings   Negatives:  Signs/symptoms of thrombosis, Signs/symptoms of bleeding, Laboratory test error suspected, Change in health, Change in alcohol use, Change in activity, Upcoming invasive procedure, Emergency department visit, Upcoming dental procedure, Missed doses, Extra doses, Change in medications, Change in diet/appetite, Hospital admission, Bruising, Other complaints   Comments:  Discussed with Christiana at the [facility] who denies any changes.  Denies any signs/ symptoms unusual bruising or bleeding  Cannot think of anything that would cause her INR to jump     Plan:  1. INR was 4 per POCT today despite onetime dose hold last week.   Today, instructed Christiana to have Ms. Deutsch take warfarin 3.75mg oral daily except 2.5mg on TuesThursSat until recheck. (22.5 mg/week, 10% reduction)  2. Repeat INR on Monday, 10/21.  3. Verbal information provided over the phone. Adelaide Deutsch's RN RBV dosing instructions, expresses understanding by teach back, and has no further questions at this time.      Reena Moore, PharmD  10/15/2019  11:14 AM

## 2019-10-21 NOTE — PROGRESS NOTES
Anticoagulation Clinic - Remote Progress Note  [facility] [employer]  Frequency of Monitorin-4x a a month    Indication: chronic afib  Referring Provider: Luiza  Initial Warfarin Start Date:   Goal INR: 2.0-3.0  Current Drug Interactions: Omeprazole, MVI, CoQ10, Levothyroxine  CHADS-VASc: 4 [HTN, Age>75, Female]  HAS-BLED: 2 (HTN, Elderly)  Clinic: 2018    Diet: Green beans, glenroy slaw once week, iceberg salad ~2x a week (10/21/19)  Alcohol: None  Tobacco: None  OTC Pain Medication: Tylenol    INR History:  Date 12/11 12/20 1/3/18 1/18 1/31 2/14 2/22 2/28 3/7 3/23 4/2 4/11 4/25   Total Weekly Dose 27.5 mg 27.5 mg 27.5  mg 27.5 mg   27.5mg 27.5 mg 25mg 27.5mg 27.5  mg 27.5 mg 27.5mg 27.5 mg 27.5  mg   INR 2.7 3.0 3.0 2.7 2.5 3.5 2.2 2.9 2.4 3.1 2.2 2.9 2.8   Notes       Held 1x    1 decr dose       Date 5/10 5/17 5/23 6/6 6/13 6/20 6/22 6/27 7/1 7/5   Total Weekly Dose 25mg 27.5mg 27.5mg 26.25mg 27.5mg 25mg 20mg 20mg 20mg 17.5mg   INR 1.9 2.3 2.4 3.1 3.8; 4.0 3.9 2.4 3.2 2.1 2.6   Notes missed 1x dose, self adj   Less GLV; daughter [facility]; Less GLV    More GLV; dose decr.      Date 7/11 7/18 7/25 8/1 8/7 8/15 8/22 9/5 9/10 9/17   Total Weekly Dose 17.5mg 18.75mg 18.75mg 20mg 22.5mg 20mg 20mg 20mg 22.5mg 22.5mg   INR 1.9 1.7 2.0 1.5 1.8 2.1 2.1 1.5 1.7 1.5   Notes     boost boost   boost      Date 9/24 10/1 10/15 10/22 10/29 11/5 11/19 11/26 12/10 12/24 12/31   Total Weekly Dose 23.75  mg 22.5mg 22.5mg 18.75mg 22.5mg 23.75mg 23.75mg 23.75mg 23.75mg 22.5mg 23.75mg   INR 2.1 2.1 2.7 1.8 1.9 2.1 2.4 2.5 2.2 1.9 2.3   Notes boost   missed 10/15 x 1  1x incr dose    Mis-dose      Date 1/14/19 1/28 2/11 2/22 2/28 3/7 3/20 3/27 4/10 4/24 5/1   Total Weekly Dose 23.75  mg 23.75  mg 23.75  mg 23.75  mg 23.75  mg 23.75  mg 23.75  mg 23.75  mg 23.75  mg 23.75  mg 23.75  mg   INR 2.3 2.0 2.1 2.4 2.8 2.4 2.3 2.8 2.9 1.8 1.9   Notes  1 miss     fall         Date 5/8 5/15 5/22 6/5  6/19 6/26 7/3 7/8 7/15 7/22 8/5    Total Weekly Dose 25mg 23.75  mg 23.75  mg 23.75  mg 23.75  mg 25 mg 23.75mg 25mg 25mg 25mg 25mg   INR 2.2 2.6 2.5 2.1  2.0 2.6 1.5 2.0 2.2 2.1 1.8   Notes     incr GLV at [place]  missed dose bruising        Date 8/15 8/26 9/3 9/10 9/24 10/1 10/8  10/15 10/21    Total Weekly Dose 25mg 25mg 25mg 25mg 25mg 26.25  mg 25mg  21.25 mg 22.5mg    INR 3.2 2.6 2.6 2.4 1.6 2.3 4.8 (POCT)  3.75 (ryan)  4 (POCT) 2.3    Notes      1x boost         Phone Interview:  Tablet Strength: 2.5mg tablet  Patient Contact Info: Mikayal Chaves, Cell (daughter) [phone]  [facility] in [place] Contact: Christiana ([phone]) *preferred*    Patient Findings:  Negatives:  Signs/symptoms of thrombosis, Signs/symptoms of bleeding, Laboratory test error suspected, Change in health, Change in alcohol use, Change in activity, Upcoming invasive procedure, Emergency department visit, Upcoming dental procedure, Missed doses, Extra doses, Change in medications, Change in diet/appetite, Hospital admission, Bruising, Other complaints   Comments:  Christiana denies any changes with patient.      Plan:  1. INR is back WNL today. Instructed Christiana to have Ms. Deutsch decrease her maintenance dose to warfarin 3.75mg daily except 2.5mg on TuesSat for this week until recheck (23.75mg).  2. Repeat INR in one week.  3. Verbal information provided over the phone. Adelaide Deutsch's RN RBV dosing instructions, expresses understanding by teach back, and has no further questions at this time.    Eleanor Patino, [place]  10/21/2019  10:19 AM     IRhonda, PharmD, have reviewed the note in full and agree with the assessment and plan.  10/21/19  11:24 AM

## 2019-10-28 NOTE — PROGRESS NOTES
Anticoagulation Clinic - Remote Progress Note  [facility] [employer]  Frequency of Monitorin-4x a a month    Indication: chronic afib  Referring Provider: Luiza  Goal INR: 2.0-3.0  Current Drug Interactions: Omeprazole, MVI, CoQ10, Levothyroxine  CHADS-VASc: 4 [HTN, Age>75, Female]  HAS-BLED: 2 (HTN, Elderly)  Clinic: 2018    Diet: Green beans, glenroy slaw once week, iceberg salad ~2x a week (10/21/19)  Alcohol: None  Tobacco: None  OTC Pain Medication: Tylenol    INR History:  Date 12/11 12/20 1/3/18 1/18 1/31 2/14 2/22 2/28 3/7 3/23 4/2 4/11 4/25   Total Weekly Dose 27.5 mg 27.5 mg 27.5  mg 27.5 mg   27.5mg 27.5 mg 25mg 27.5mg 27.5  mg 27.5 mg 27.5mg 27.5 mg 27.5  mg   INR 2.7 3.0 3.0 2.7 2.5 3.5 2.2 2.9 2.4 3.1 2.2 2.9 2.8   Notes       Held 1x    1 decr dose       Date 5/10 5/17 5/23 6/6 6/13 6/20 6/22 6/27 7/1 7/5   Total Weekly Dose 25mg 27.5mg 27.5mg 26.25mg 27.5mg 25mg 20mg 20mg 20mg 17.5mg   INR 1.9 2.3 2.4 3.1 3.8; 4.0 3.9 2.4 3.2 2.1 2.6   Notes missed 1x dose, self adj   Less GLV; daughter [place] [facility]; Less GLV    More GLV; dose decr.      Date 7/11 7/18 7/25 8/1 8/7 8/15 8/22 9/5 9/10 9/17   Total Weekly Dose 17.5mg 18.75mg 18.75mg 20mg 22.5mg 20mg 20mg 20mg 22.5mg 22.5mg   INR 1.9 1.7 2.0 1.5 1.8 2.1 2.1 1.5 1.7 1.5   Notes     boost boost   boost      Date 9/24 10/1 10/15 10/22 10/29 11/5 11/19 11/26 12/10 12/24 12/31   Total Weekly Dose 23.75  mg 22.5mg 22.5mg 18.75mg 22.5mg 23.75mg 23.75mg 23.75mg 23.75mg 22.5mg 23.75mg   INR 2.1 2.1 2.7 1.8 1.9 2.1 2.4 2.5 2.2 1.9 2.3   Notes boost   missed 10/15 x 1  1x incr dose    Mis-dose      Date 1/14/19 1/28 2/11 2/22 2/28 3/7 3/20 3/27 4/10 4/24 5/1   Total Weekly Dose 23.75  mg 23.75  mg 23.75  mg 23.75  mg 23.75  mg 23.75  mg 23.75  mg 23.75  mg 23.75  mg 23.75  mg 23.75  mg   INR 2.3 2.0 2.1 2.4 2.8 2.4 2.3 2.8 2.9 1.8 1.9   Notes  1 miss     fall         Date 5/8 5/15 5/22 6/5  6/19 6/26 7/3 7/8 7/15 7/22 8/5   Total Weekly Dose 25mg 23.75  mg  23.75  mg 23.75  mg 23.75  mg 25 mg 23.75mg 25mg 25mg 25mg 25mg   INR 2.2 2.6 2.5 2.1  2.0 2.6 1.5 2.0 2.2 2.1 1.8   Notes     incr GLV at [place]  missed dose bruising        Date 8/15 8/26 9/3 9/10 9/24 10/1 10/8  10/15 10/21 10/28   Total Weekly Dose 25mg 25mg 25mg 25mg 25mg 26.25  mg 25mg  21.25 mg 22.5mg 23.75mg   INR 3.2 2.6 2.6 2.4 1.6 2.3 4.8 (POCT)  3.75 (ryan)  4.0 2.3 2.5   Notes      1x boost         Phone Interview:  Tablet Strength: 2.5mg tablet  Patient Contact Info: Mikayla Chaves, Cell (daughter) [phone]  [facility] in [place] Contact: Christiana ([phone]) *preferred*    Patient Findings:  Negatives:  Signs/symptoms of thrombosis, Signs/symptoms of bleeding, Laboratory test error suspected, Change in health, Change in alcohol use, Change in activity, Upcoming invasive procedure, Emergency department visit, Upcoming dental procedure, Missed doses, Extra doses, Change in medications, Change in diet/appetite, Hospital admission, Bruising, Other complaints   Comments:  Christiana denies any changes with Mrs. Deutsch.     Plan:  1. INR is therapeutic today. Instructed Christiana to have Mrs. Deutsch continue warfarin 3.75mg daily except 2.5mg on TuesSat.  2. Repeat INR in one week.  3. Verbal information provided over the phone. Adelaide Deutsch's RN RBV dosing instructions, expresses understanding by teach back, and has no further questions at this time.    Eleanor Patino, [place]  10/28/2019  10:18 AM    I, Leena Davis, [facility], have reviewed the note in full and agree with the assessment and plan.  10/28/19  12:26 PM

## 2019-11-04 NOTE — PROGRESS NOTES
Anticoagulation Clinic - Remote Progress Note  [facility]  Frequency of Monitorin-4x a a month    Indication: chronic afib  Referring Provider: Luiza  Goal INR: 2.0-3.0  Current Drug Interactions: Omeprazole, MVI, CoQ10, Levothyroxine  CHADS-VASc: 4 [HTN, Age>75, Female]  HAS-BLED: 2 (HTN, Elderly)  Clinic: 2018    Diet: Green beans, glenroy slaw once week, iceberg salad ~2x a week (10/21/19)  Alcohol: None  Tobacco: None  OTC Pain Medication: Tylenol    INR History:  Date 12/11 12/20 1/3/18 1/18 1/31 2/14 2/22 2/28 3/7 3/23 4/2 4/11 4/25   Total Weekly Dose 27.5 mg 27.5 mg 27.5  mg 27.5 mg   27.5mg 27.5 mg 25mg 27.5mg 27.5  mg 27.5 mg 27.5mg 27.5 mg 27.5  mg   INR 2.7 3.0 3.0 2.7 2.5 3.5 2.2 2.9 2.4 3.1 2.2 2.9 2.8   Notes       Held 1x    1 decr dose       Date 5/10 5/17 5/23 6/6 6/13 6/20 6/22 6/27 7/1 7/5   Total Weekly Dose 25mg 27.5mg 27.5mg 26.25mg 27.5mg 25mg 20mg 20mg 20mg 17.5mg   INR 1.9 2.3 2.4 3.1 3.8; 4.0 3.9 2.4 3.2 2.1 2.6   Notes missed 1x dose, self adj   Less GLV; daughter [place] clinic; Less GLV    More GLV; dose decr.      Date 7/11 7/18 7/25 8/1 8/7 8/15 8/22 9/5 9/10 9/17   Total Weekly Dose 17.5mg 18.75mg 18.75mg 20mg 22.5mg 20mg 20mg 20mg 22.5mg 22.5mg   INR 1.9 1.7 2.0 1.5 1.8 2.1 2.1 1.5 1.7 1.5   Notes     boost boost   boost      Date 9/24 10/1 10/15 10/22 10/29 11/5 11/19 11/26 12/10 12/24 12/31   Total Weekly Dose 23.75  mg 22.5mg 22.5mg 18.75mg 22.5mg 23.75mg 23.75mg 23.75mg 23.75mg 22.5mg 23.75mg   INR 2.1 2.1 2.7 1.8 1.9 2.1 2.4 2.5 2.2 1.9 2.3   Notes boost   missed 10/15 x 1  1x incr dose    Mis-dose      Date 1/14/19 1/28 2/11 2/22 2/28 3/7 3/20 3/27 4/10 4/24 5/1   Total Weekly Dose 23.75  mg 23.75  mg 23.75  mg 23.75  mg 23.75  mg 23.75  mg 23.75  mg 23.75  mg 23.75  mg 23.75  mg 23.75  mg   INR 2.3 2.0 2.1 2.4 2.8 2.4 2.3 2.8 2.9 1.8 1.9   Notes  1 miss     fall         Date 5/8 5/15 5/22 6/5  6/19 6/26 7/3 7/8 7/15 7/22 8/5   Total Weekly Dose 25mg 23.75  mg  23.75  mg 23.75  mg 23.75  mg 25 mg 23.75mg 25mg 25mg 25mg 25mg   INR 2.2 2.6 2.5 2.1  2.0 2.6 1.5 2.0 2.2 2.1 1.8   Notes     incr GLV at [place]  missed dose bruising        Date 8/15 8/26 9/3 9/10 9/24 10/1 10/8  10/15 10/21 10/28 11/4   Total Weekly Dose 25mg 25mg 25mg 25mg 25mg 26.25  mg 25mg  21.25 mg 22.5mg 23.75mg 23.75 mg   INR 3.2 2.6 2.6 2.4 1.6 2.3 4.8 (POCT)  3.75 (ryan)  4.0 2.3 2.5 2.4   Notes      1x boost          Phone Interview:  Tablet Strength: 2.5mg tablet  Patient Contact Info: Mikayla Chaves, Cell (daughter) [phone]  [facility] in [place] Contact: Christiana ([phone]) *preferred*    Patient Findings:  Negatives:  Signs/symptoms of thrombosis, Signs/symptoms of bleeding, Laboratory test error suspected, Change in health, Change in alcohol use, Change in activity, Upcoming invasive procedure, Emergency department visit, Upcoming dental procedure, Missed doses, Extra doses, Change in medications, Change in diet/appetite, Hospital admission, Bruising, Other complaints     Plan:  1. INR is therapeutic today, so instructed Hawa to have Mrs. Deutsch continue warfarin 3.75mg daily except 2.5mg on TuesSat.  2. Repeat INR in one week. If therapeutic again, will resume q2 week checks.  3. Verbal information provided over the phone. Adelaide Deutsch's RN RBV dosing instructions, expresses understanding by teach back, and has no further questions at this time.    Leena Davis RPH  11/04/19  3:02 PM

## 2019-11-11 NOTE — PROGRESS NOTES
Anticoagulation Clinic - Remote Progress Note  [facility]  Frequency of Monitorin-4x a a month    Indication: chronic afib  Referring Provider: Luiza  Goal INR: 2.0-3.0  Current Drug Interactions: Omeprazole, MVI, CoQ10, Levothyroxine  CHADS-VASc: 4 [HTN, Age>75, Female]  HAS-BLED: 2 (HTN, Elderly)  Clinic: 2018    Diet: Green beans, glenroy slaw once week, iceberg salad ~2x a week (10/21/19)  Alcohol: None  Tobacco: None  OTC Pain Medication: Tylenol    INR History:  Date 12/11 12/20 1/3/18 1/18 1/31 2/14 2/22 2/28 3/7 3/23 4/2 4/11 4/25   Total Weekly Dose 27.5 mg 27.5 mg 27.5  mg 27.5 mg   27.5mg 27.5 mg 25mg 27.5mg 27.5  mg 27.5 mg 27.5mg 27.5 mg 27.5  mg   INR 2.7 3.0 3.0 2.7 2.5 3.5 2.2 2.9 2.4 3.1 2.2 2.9 2.8   Notes       Held 1x    1 decr dose       Date 5/10 5/17 5/23 6/6 6/13 6/20 6/22 6/27 7/1 7/5   Total Weekly Dose 25mg 27.5mg 27.5mg 26.25mg 27.5mg 25mg 20mg 20mg 20mg 17.5mg   INR 1.9 2.3 2.4 3.1 3.8; 4.0 3.9 2.4 3.2 2.1 2.6   Notes missed 1x dose, self adj   Less GLV; daughter [place] clinic; Less GLV    More GLV; dose decr.      Date 7/11 7/18 7/25 8/1 8/7 8/15 8/22 9/5 9/10 9/17   Total Weekly Dose 17.5mg 18.75mg 18.75mg 20mg 22.5mg 20mg 20mg 20mg 22.5mg 22.5mg   INR 1.9 1.7 2.0 1.5 1.8 2.1 2.1 1.5 1.7 1.5   Notes     boost boost   boost      Date 9/24 10/1 10/15 10/22 10/29 11/5 11/19 11/26 12/10 12/24 12/31   Total Weekly Dose 23.75  mg 22.5mg 22.5mg 18.75mg 22.5mg 23.75mg 23.75mg 23.75mg 23.75mg 22.5mg 23.75mg   INR 2.1 2.1 2.7 1.8 1.9 2.1 2.4 2.5 2.2 1.9 2.3   Notes boost   missed 10/15 x 1  1x incr dose    Mis-dose      Date 1/14/19 1/28 2/11 2/22 2/28 3/7 3/20 3/27 4/10 4/24 5/1   Total Weekly Dose 23.75  mg 23.75  mg 23.75  mg 23.75  mg 23.75  mg 23.75  mg 23.75  mg 23.75  mg 23.75  mg 23.75  mg 23.75  mg   INR 2.3 2.0 2.1 2.4 2.8 2.4 2.3 2.8 2.9 1.8 1.9   Notes  1 miss     fall         Date 5/8 5/15 5/22 6/5  6/19 6/26 7/3 7/8 7/15 7/22 8/5   Total Weekly Dose 25mg 23.75  mg  23.75  mg 23.75  mg 23.75  mg 25 mg 23.75mg 25mg 25mg 25mg 25mg   INR 2.2 2.6 2.5 2.1  2.0 2.6 1.5 2.0 2.2 2.1 1.8   Notes     incr GLV at [place]  missed dose bruising        Date 8/15 8/26 9/3 9/10 9/24 10/1 10/8  10/15 10/21 10/28 11/4   Total Weekly Dose 25mg 25mg 25mg 25mg 25mg 26.25  mg 25mg  21.25 mg 22.5mg 23.75mg 23.75 mg   INR 3.2 2.6 2.6 2.4 1.6 2.3 4.8 (POCT)  3.75 (ryan)  4.0 2.3 2.5 2.4   Notes      1x boost          Date 11/11             Total Weekly Dose 23.75mg             INR 1.9             Notes                  Phone Interview:  Tablet Strength: 2.5mg tablet  Patient Contact Info: Mikayla Chaves, Cell (daughter) [phone]  [facility] in [place] Contact: Christiana ([phone]) *preferred*    Patient Findings   Negatives:  Signs/symptoms of thrombosis, Signs/symptoms of bleeding, Laboratory test error suspected, Change in health, Change in alcohol use, Change in activity, Upcoming invasive procedure, Emergency department visit, Upcoming dental procedure, Missed doses, Extra doses, Change in medications, Change in diet/appetite, Hospital admission, Bruising, Other complaints   Comments:  Spoke with Christiana who denies all patient findings at this time.     Plan:  1. INR is therapeutic today. Instructed Christiana to have Mrs. Deutsch continue warfarin 3.75mg daily except 2.5mg on TuesSat.  2. Repeat INR on Friday 11/15.   3. Verbal information provided over the phone. Adelaide Deutsch's RN RBV dosing instructions, expresses understanding by teach back, and has no further questions at this time.    Rhonda Singh, PharmD  11/11/19  10:38 AM

## 2019-11-15 NOTE — PROGRESS NOTES
Anticoagulation Clinic - Remote Progress Note  [facility]  Frequency of Monitorin-4x a a month    Indication: chronic afib  Referring Provider: Luiza  Goal INR: 2.0-3.0  Current Drug Interactions: Omeprazole, MVI, CoQ10, Levothyroxine  CHADS-VASc: 4 [HTN, Age>75, Female]  HAS-BLED: 2 (HTN, Elderly)  Clinic: 2018    Diet: Green beans, glenroy slaw once week, iceberg salad ~2x a week (10/21/19)  Alcohol: None  Tobacco: None  OTC Pain Medication: Tylenol    INR History:  Date 12/11 12/20 1/3/18 1/18 1/31 2/14 2/22 2/28 3/7 3/23 4/2 4/11 4/25   Total Weekly Dose 27.5 mg 27.5 mg 27.5  mg 27.5 mg   27.5mg 27.5 mg 25mg 27.5mg 27.5  mg 27.5 mg 27.5mg 27.5 mg 27.5  mg   INR 2.7 3.0 3.0 2.7 2.5 3.5 2.2 2.9 2.4 3.1 2.2 2.9 2.8   Notes       Held 1x    1 decr dose       Date 5/10 5/17 5/23 6/6 6/13 6/20 6/22 6/27 7/1 7/5   Total Weekly Dose 25mg 27.5mg 27.5mg 26.25mg 27.5mg 25mg 20mg 20mg 20mg 17.5mg   INR 1.9 2.3 2.4 3.1 3.8; 4.0 3.9 2.4 3.2 2.1 2.6   Notes missed 1x dose, self adj   Less GLV; daughter [facility]; Less GLV    More GLV; dose decr.      Date 7/11 7/18 7/25 8/1 8/7 8/15 8/22 9/5 9/10 9/17   Total Weekly Dose 17.5mg 18.75mg 18.75mg 20mg 22.5mg 20mg 20mg 20mg 22.5mg 22.5mg   INR 1.9 1.7 2.0 1.5 1.8 2.1 2.1 1.5 1.7 1.5   Notes     boost boost   boost      Date 9/24 10/1 10/15 10/22 10/29 11/5 11/19 11/26 12/10 12/24 12/31   Total Weekly Dose 23.75  mg 22.5mg 22.5mg 18.75mg 22.5mg 23.75mg 23.75mg 23.75mg 23.75mg 22.5mg 23.75mg   INR 2.1 2.1 2.7 1.8 1.9 2.1 2.4 2.5 2.2 1.9 2.3   Notes boost   missed 10/15 x 1  1x incr dose    Mis-dose      Date 1/14/19 1/28 2/11 2/22 2/28 3/7 3/20 3/27 4/10 4/24 5/1   Total Weekly Dose 23.75  mg 23.75  mg 23.75  mg 23.75  mg 23.75  mg 23.75  mg 23.75  mg 23.75  mg 23.75  mg 23.75  mg 23.75  mg   INR 2.3 2.0 2.1 2.4 2.8 2.4 2.3 2.8 2.9 1.8 1.9   Notes  1 miss     fall         Date 5/8 5/15 5/22 6/5  6/19 6/26 7/3 7/8 7/15 7/22 8/5   Total Weekly Dose 25mg 23.75  mg  23.75  mg 23.75  mg 23.75  mg 25 mg 23.75mg 25mg 25mg 25mg 25mg   INR 2.2 2.6 2.5 2.1  2.0 2.6 1.5 2.0 2.2 2.1 1.8   Notes     incr GLV at [place]  missed dose bruising        Date 8/15 8/26 9/3 9/10 9/24 10/1 10/8  10/15 10/21 10/28 11/4   Total Weekly Dose 25mg 25mg 25mg 25mg 25mg 26.25  mg 25mg  21.25 mg 22.5mg 23.75mg 23.75 mg   INR 3.2 2.6 2.6 2.4 1.6 2.3 4.8 (POCT)  3.75 (ryan)  4.0 2.3 2.5 2.4   Notes      1x boost          Date 11/11 11/15            Total Weekly Dose 23.75mg 23.75mg            INR 1.9 1.9            Notes                Phone Interview:  Tablet Strength: 2.5mg tablet  Patient Contact Info: Mikayla Chaves, Cell (daughter) [phone]  [facility] in [place] Contact: Christiana ([phone]) *preferred*    Patient Findings   Negatives:  Signs/symptoms of thrombosis, Signs/symptoms of bleeding, Laboratory test error suspected, Change in health, Change in alcohol use, Change in activity, Upcoming invasive procedure, Emergency department visit, Upcoming dental procedure, Missed doses, Extra doses, Change in medications, Change in diet/appetite, Hospital admission, Bruising, Other complaints   Comments:  Spoke with Christiana who denies all patient findings at this time.     Plan:  1. INR is therapeutic today. Per Rhonda Singh, PharmD, instructed Christiana to have Mrs. Deutsch increase maintenance dose to warfarin 3.75mg daily except 2.5mg on Tuesdays until recheck. (25mg/weekly, 5.3% increase)  2. Repeat INR on Thursday.   3. Verbal information provided over the phone. Adelaide Deutsch's RN RBV dosing instructions, expresses understanding by teach back, and has no further questions at this time.    Eleanor Patino, Pharmacy Technician  11/15/19  1:26 PM     Patient has previously been therapeutic on this dose until recently.  I, Rhonda Singh, PharmD, have reviewed the note in full and agree with the assessment and plan.  11/15/19  5:04 PM

## 2019-11-21 NOTE — PROGRESS NOTES
Anticoagulation Clinic - Remote Progress Note  [facility]  Frequency of Monitorin-4x a a month    Indication: chronic afib  Referring Provider: Luiza  Goal INR: 2.0-3.0  Current Drug Interactions: Omeprazole, MVI, CoQ10, Levothyroxine  CHADS-VASc: 4 [HTN, Age>75, Female]  HAS-BLED: 2 (HTN, Elderly)  Clinic: 2018    Diet: Green beans, glenroy slaw once week, iceberg salad ~2x a week (10/21/19)  Alcohol: None  Tobacco: None  OTC Pain Medication: Tylenol    INR History:  Date 12/11 12/20 1/3/18 1/18 1/31 2/14 2/22 2/28 3/7 3/23 4/2 4/11 4/25   Total Weekly Dose 27.5 mg 27.5 mg 27.5  mg 27.5 mg   27.5mg 27.5 mg 25mg 27.5mg 27.5  mg 27.5 mg 27.5mg 27.5 mg 27.5  mg   INR 2.7 3.0 3.0 2.7 2.5 3.5 2.2 2.9 2.4 3.1 2.2 2.9 2.8   Notes       Held 1x    1 decr dose       Date 5/10 5/17 5/23 6/6 6/13 6/20 6/22 6/27 7/1 7/5   Total Weekly Dose 25mg 27.5mg 27.5mg 26.25mg 27.5mg 25mg 20mg 20mg 20mg 17.5mg   INR 1.9 2.3 2.4 3.1 3.8; 4.0 3.9 2.4 3.2 2.1 2.6   Notes missed 1x dose, self adj   Less GLV; daughter [facility]; Less GLV    More GLV; dose decr.      Date 7/11 7/18 7/25 8/1 8/7 8/15 8/22 9/5 9/10 9/17   Total Weekly Dose 17.5mg 18.75mg 18.75mg 20mg 22.5mg 20mg 20mg 20mg 22.5mg 22.5mg   INR 1.9 1.7 2.0 1.5 1.8 2.1 2.1 1.5 1.7 1.5   Notes     boost boost   boost      Date 9/24 10/1 10/15 10/22 10/29 11/5 11/19 11/26 12/10 12/24 12/31   Total Weekly Dose 23.75  mg 22.5mg 22.5mg 18.75mg 22.5mg 23.75mg 23.75mg 23.75mg 23.75mg 22.5mg 23.75mg   INR 2.1 2.1 2.7 1.8 1.9 2.1 2.4 2.5 2.2 1.9 2.3   Notes boost   missed 10/15 x 1  1x incr dose    Mis-dose      Date 1/14/19 1/28 2/11 2/22 2/28 3/7 3/20 3/27 4/10 4/24 5/1   Total Weekly Dose 23.75  mg 23.75  mg 23.75  mg 23.75  mg 23.75  mg 23.75  mg 23.75  mg 23.75  mg 23.75  mg 23.75  mg 23.75  mg   INR 2.3 2.0 2.1 2.4 2.8 2.4 2.3 2.8 2.9 1.8 1.9   Notes  1 miss     fall         Date 5/8 5/15 5/22 6/5  6/19 6/26 7/3 7/8 7/15 7/22 8/5   Total Weekly Dose 25mg 23.75  mg  23.75  mg 23.75  mg 23.75  mg 25 mg 23.75mg 25mg 25mg 25mg 25mg   INR 2.2 2.6 2.5 2.1  2.0 2.6 1.5 2.0 2.2 2.1 1.8   Notes     incr GLV at [facility]  missed dose bruising        Date 8/15 8/26 9/3 9/10 9/24 10/1 10/8  10/15 10/21 10/28 11/4   Total Weekly Dose 25mg 25mg 25mg 25mg 25mg 26.25  mg 25mg  21.25 mg 22.5mg 23.75mg 23.75 mg   INR 3.2 2.6 2.6 2.4 1.6 2.3 4.8 (POCT)  3.75 (ryan)  4.0 2.3 2.5 2.4   Notes      1x boost          Date 11/11 11/15 11/21           Total Weekly Dose 23.75mg 23.75mg 25mg 25mg          INR 1.9 1.9 2.4           Notes   Boost x1             Phone Interview:  Tablet Strength: 2.5mg tablet  Patient Contact Info: Mikayla Chaves, Cell (daughter) [phone]  [facility] in [place] Contact: Christiana ([phone]) *preferred*    Patient Findings   Negatives:  Signs/symptoms of thrombosis, Signs/symptoms of bleeding, Laboratory test error suspected, Change in health, Change in alcohol use, Change in activity, Upcoming invasive procedure, Emergency department visit, Upcoming dental procedure, Missed doses, Extra doses, Change in medications, Change in diet/appetite, Hospital admission, Bruising, Other complaints   Comments:  Per Christiana, amarjit findings negative     Plan:  1. INR therapeutic today at 2.4. Would recommend continuing warfarin 3.75mg oral daily except 2.5mg on Tuesdays.  2. Repeat INR on 12/3.  3. Verbal information provided over the phone. Adelaide Deutsch's RN RBV dosing instructions, expresses understanding by teach back, and has no further questions at this time.    Reena Moore, PharmD  11/21/2019  11:28 AM

## 2019-12-03 NOTE — PROGRESS NOTES
Anticoagulation Clinic - Remote Progress Note  [facility]  Frequency of Monitorin-4x a a month    Indication: chronic afib  Referring Provider: Luiza  Goal INR: 2.0-3.0  Current Drug Interactions: Omeprazole, MVI, CoQ10, Levothyroxine  CHADS-VASc: 4 [HTN, Age>75, Female]  HAS-BLED: 2 (HTN, Elderly)  Clinic: 2018    Diet: Green beans, glenroy slaw once week, iceberg salad ~2x a week (10/21/19)  Alcohol: None  Tobacco: None  OTC Pain Medication: Tylenol    INR History:  Date 12/11 12/20 1/3/18 1/18 1/31 2/14 2/22 2/28 3/7 3/23 4/2 4/11 4/25   Total Weekly Dose 27.5 mg 27.5 mg 27.5  mg 27.5 mg   27.5mg 27.5 mg 25mg 27.5mg 27.5  mg 27.5 mg 27.5mg 27.5 mg 27.5  mg   INR 2.7 3.0 3.0 2.7 2.5 3.5 2.2 2.9 2.4 3.1 2.2 2.9 2.8   Notes       Held 1x    1 decr dose       Date 5/10 5/17 5/23 6/6 6/13 6/20 6/22 6/27 7/1 7/5   Total Weekly Dose 25mg 27.5mg 27.5mg 26.25mg 27.5mg 25mg 20mg 20mg 20mg 17.5mg   INR 1.9 2.3 2.4 3.1 3.8; 4.0 3.9 2.4 3.2 2.1 2.6   Notes missed 1x dose, self adj   Less GLV; daughter [place] [facility]; Less GLV    More GLV; dose decr.      Date 7/11 7/18 7/25 8/1 8/7 8/15 8/22 9/5 9/10 9/17   Total Weekly Dose 17.5mg 18.75mg 18.75mg 20mg 22.5mg 20mg 20mg 20mg 22.5mg 22.5mg   INR 1.9 1.7 2.0 1.5 1.8 2.1 2.1 1.5 1.7 1.5   Notes     boost boost   boost      Date 9/24 10/1 10/15 10/22 10/29 11/5 11/19 11/26 12/10 12/24 12/31   Total Weekly Dose 23.75  mg 22.5mg 22.5mg 18.75mg 22.5mg 23.75mg 23.75mg 23.75mg 23.75mg 22.5mg 23.75mg   INR 2.1 2.1 2.7 1.8 1.9 2.1 2.4 2.5 2.2 1.9 2.3   Notes boost   missed 10/15 x 1  1x incr dose    Mis-dose      Date 1/14/19 1/28 2/11 2/22 2/28 3/7 3/20 3/27 4/10 4/24 5/1   Total Weekly Dose 23.75  mg 23.75  mg 23.75  mg 23.75  mg 23.75  mg 23.75  mg 23.75  mg 23.75  mg 23.75  mg 23.75  mg 23.75  mg   INR 2.3 2.0 2.1 2.4 2.8 2.4 2.3 2.8 2.9 1.8 1.9   Notes  1 miss     fall         Date 5/8 5/15 5/22 6/5  6/19 6/26 7/3 7/8 7/15 7/22 8/5   Total Weekly Dose 25mg 23.75  mg  23.75  mg 23.75  mg 23.75  mg 25 mg 23.75mg 25mg 25mg 25mg 25mg   INR 2.2 2.6 2.5 2.1  2.0 2.6 1.5 2.0 2.2 2.1 1.8   Notes     incr GLV at [facility]  missed dose bruising        Date 8/15 8/26 9/3 9/10 9/24 10/1 10/8 10/15 10/21 10/28 11/4   Total Weekly Dose 25mg 25mg 25mg 25mg 25mg 26.25  mg 25mg 21.25  mg 22.5mg 23.75  mg 23.75  mg   INR 3.2 2.6 2.6 2.4 1.6 2.3 4.8 (POCT)  3.75 (ryan) 4.0 2.3 2.5 2.4   Notes      1x boost          Date 11/11 11/15 11/21 12/3          Total Weekly Dose 23.75  mg 23.75  mg 25mg 25mg          INR 1.9 1.9 2.4 3.1          Notes   Boost x1             Phone Interview:  Tablet Strength: 2.5mg tablet  Patient Contact Info: Mikayla Chaves, Cell (daughter) [phone]  [facility] in [place] Contact: Christiana (or Hawa) [phone] **preferred**    Patient Findings   Negatives:  Signs/symptoms of thrombosis, Signs/symptoms of bleeding, Laboratory test error suspected, Change in health, Change in alcohol use, Change in activity, Upcoming invasive procedure, Emergency department visit, Upcoming dental procedure, Missed doses, Extra doses, Change in medications, Change in diet/appetite, Hospital admission, Bruising, Other complaints   Comments:  Hawa denies all findings for patient. Will have her eat a serving of green beans tonight and try to have patient be consistent with GLV intake through the week     Plan:  1. INR is slightly SUPRAtherapeutic today at 3.1. Instructed Hawa to have patient eat a good serving of GLV tonight and then continue warfarin 3.75mg oral daily except 2.5mg on Tuesdays.  2. Repeat INR in 1 week  3. Verbal information provided over the phone. Adelaide Deutsch's RN RBV dosing instructions, expresses understanding by teach back, and has no further questions at this time.    Loco Valle, [place]  12/3/2019  3:07 PM    [facility], have reviewed the note in full and agree with the assessment and plan.  12/03/19  5:18 PM

## 2019-12-10 NOTE — PROGRESS NOTES
Anticoagulation Clinic - Remote Progress Note  [facility]  Frequency of Monitorin-4x a a month    Indication: chronic afib  Referring Provider: Luiza  Goal INR: 2.0-3.0  Current Drug Interactions: Omeprazole, MVI, CoQ10, Levothyroxine, celexa  CHADS-VASc: 4 [HTN, Age>75, Female]  HAS-BLED: 2 (HTN, Elderly)  Clinic: 2018    Diet: Green beans, glenroy slaw once week, iceberg salad ~2x a week (10/21/19)  Alcohol: None  Tobacco: None  OTC Pain Medication: Tylenol    INR History:  Date 12/11 12/20 1/3/18 1/18 1/31 2/14 2/22 2/28 3/7 3/23 4/2 4/11 4/25   Total Weekly Dose 27.5 mg 27.5 mg 27.5  mg 27.5 mg   27.5mg 27.5 mg 25mg 27.5mg 27.5  mg 27.5 mg 27.5mg 27.5 mg 27.5  mg   INR 2.7 3.0 3.0 2.7 2.5 3.5 2.2 2.9 2.4 3.1 2.2 2.9 2.8   Notes       Held 1x    1 decr dose       Date 5/10 5/17 5/23 6/6 6/13 6/20 6/22 6/27 7/1 7/5   Total Weekly Dose 25mg 27.5mg 27.5mg 26.25mg 27.5mg 25mg 20mg 20mg 20mg 17.5mg   INR 1.9 2.3 2.4 3.1 3.8; 4.0 3.9 2.4 3.2 2.1 2.6   Notes missed 1x dose, self adj   Less GLV; daughter [facility]; Less GLV    More GLV; dose decr.      Date 7/11 7/18 7/25 8/1 8/7 8/15 8/22 9/5 9/10 9/17   Total Weekly Dose 17.5mg 18.75mg 18.75mg 20mg 22.5mg 20mg 20mg 20mg 22.5mg 22.5mg   INR 1.9 1.7 2.0 1.5 1.8 2.1 2.1 1.5 1.7 1.5   Notes     boost boost   boost      Date 9/24 10/1 10/15 10/22 10/29 11/5 11/19 11/26 12/10 12/24 12/31   Total Weekly Dose 23.75  mg 22.5mg 22.5mg 18.75mg 22.5mg 23.75mg 23.75mg 23.75mg 23.75mg 22.5mg 23.75mg   INR 2.1 2.1 2.7 1.8 1.9 2.1 2.4 2.5 2.2 1.9 2.3   Notes boost   missed 10/15 x 1  1x incr dose    Mis-dose      Date 1/14/19 1/28 2/11 2/22 2/28 3/7 3/20 3/27 4/10 4/24 5/1   Total Weekly Dose 23.75  mg 23.75  mg 23.75  mg 23.75  mg 23.75  mg 23.75  mg 23.75  mg 23.75  mg 23.75  mg 23.75  mg 23.75  mg   INR 2.3 2.0 2.1 2.4 2.8 2.4 2.3 2.8 2.9 1.8 1.9   Notes  1 miss     fall         Date 5/8 5/15 5/22 6/5  6/19 6/26 7/3 7/8 7/15 7/22 8/5   Total Weekly Dose 25mg  23.75  mg 23.75  mg 23.75  mg 23.75  mg 25 mg 23.75mg 25mg 25mg 25mg 25mg   INR 2.2 2.6 2.5 2.1  2.0 2.6 1.5 2.0 2.2 2.1 1.8   Notes     incr GLV at [facility]  missed dose bruising        Date 8/15 8/26 9/3 9/10 9/24 10/1 10/8 10/15 10/21 10/28 11/4   Total Weekly Dose 25mg 25mg 25mg 25mg 25mg 26.25  mg 25mg 21.25  mg 22.5mg 23.75  mg 23.75  mg   INR 3.2 2.6 2.6 2.4 1.6 2.3 4.8 (POCT)  3.75 (ryan) 4.0 2.3 2.5 2.4   Notes      1x boost          Date 11/11 11/15 11/21 12/3  12/10         Total Weekly Dose 23.75  mg 23.75  mg 25mg 25mg  25 mg         INR 1.9 1.9 2.4 3.1  3.3         Notes   Boost x1  less GLV            Phone Interview:  Tablet Strength: 2.5mg tablet  Patient Contact Info: Mikayla Chaves, Cell (daughter) [phone]  [facility] in [place] Contact: Christiana (or Hawa) [phone] **preferred**    Patient Findings   Positives:  Change in diet/appetite   Negatives:  Signs/symptoms of thrombosis, Signs/symptoms of bleeding, Laboratory test error suspected, Change in health, Change in alcohol use, Change in activity, Upcoming invasive procedure, Emergency department visit, Upcoming dental procedure, Missed doses, Extra doses, Change in medications, Hospital admission, Bruising, Other complaints   Comments:  Christiana stated that Ms. Deutsch did have an extra serving green beans.   Otherwise, above findings negative        Plan:  1. INR is slightly SUPRAtherapeutic today at 3.3. Instructed Christiana to take warfarin 3.75mg oral daily except 2.5mg on TuesFri until recheck. (23.75 mg/week, 5% decrease)  2. Repeat INR in 1 week on 12/17  3. Verbal information provided over the phone. Adelaide Deutsch's RN RBV dosing instructions, expresses understanding by teach back, and has no further questions at this time.    Reena Moore, PharmD  12/10/2019  9:59 AM

## 2019-12-17 NOTE — PROGRESS NOTES
Anticoagulation Clinic - Remote Progress Note  [facility]  Frequency of Monitorin-4x a a month    Indication: chronic afib  Referring Provider: Luiza  Goal INR: 2.0-3.0  Current Drug Interactions: Omeprazole, MVI, CoQ10, Levothyroxine, celexa  CHADS-VASc: 4 [HTN, Age>75, Female]  HAS-BLED: 2 (HTN, Elderly)  Clinic: 2018    Diet: Green beans, glenroy slaw once week, iceberg salad ~2x a week (10/21/19)  Alcohol: None  Tobacco: None  OTC Pain Medication: Tylenol    INR History:  Date 12/11 12/20 1/3/18 1/18 1/31 2/14 2/22 2/28 3/7 3/23 4/2 4/11 4/25   Total Weekly Dose 27.5 mg 27.5 mg 27.5  mg 27.5 mg   27.5mg 27.5 mg 25mg 27.5mg 27.5  mg 27.5 mg 27.5mg 27.5 mg 27.5  mg   INR 2.7 3.0 3.0 2.7 2.5 3.5 2.2 2.9 2.4 3.1 2.2 2.9 2.8   Notes       Held 1x    1 decr dose       Date 5/10 5/17 5/23 6/6 6/13 6/20 6/22 6/27 7/1 7/5   Total Weekly Dose 25mg 27.5mg 27.5mg 26.25mg 27.5mg 25mg 20mg 20mg 20mg 17.5mg   INR 1.9 2.3 2.4 3.1 3.8; 4.0 3.9 2.4 3.2 2.1 2.6   Notes missed 1x dose, self adj   Less GLV; daughter [place] [facility]; Less GLV    More GLV; dose decr.      Date 7/11 7/18 7/25 8/1 8/7 8/15 8/22 9/5 9/10 9/17   Total Weekly Dose 17.5mg 18.75mg 18.75mg 20mg 22.5mg 20mg 20mg 20mg 22.5mg 22.5mg   INR 1.9 1.7 2.0 1.5 1.8 2.1 2.1 1.5 1.7 1.5   Notes     boost boost   boost      Date 9/24 10/1 10/15 10/22 10/29 11/5 11/19 11/26 12/10 12/24 12/31   Total Weekly Dose 23.75  mg 22.5mg 22.5mg 18.75mg 22.5mg 23.75mg 23.75mg 23.75mg 23.75mg 22.5mg 23.75mg   INR 2.1 2.1 2.7 1.8 1.9 2.1 2.4 2.5 2.2 1.9 2.3   Notes boost   missed 10/15 x 1  1x incr dose    Mis-dose      Date 1/14/19 1/28 2/11 2/22 2/28 3/7 3/20 3/27 4/10 4/24 5/1   Total Weekly Dose 23.75  mg 23.75  mg 23.75  mg 23.75  mg 23.75  mg 23.75  mg 23.75  mg 23.75  mg 23.75  mg 23.75  mg 23.75  mg   INR 2.3 2.0 2.1 2.4 2.8 2.4 2.3 2.8 2.9 1.8 1.9   Notes  1 miss     fall         Date 5/8 5/15 5/22 6/5  6/19 6/26 7/3 7/8 7/15 7/22 8/5   Total Weekly Dose 25mg  23.75  mg 23.75  mg 23.75  mg 23.75  mg 25 mg 23.75mg 25mg 25mg 25mg 25mg   INR 2.2 2.6 2.5 2.1  2.0 2.6 1.5 2.0 2.2 2.1 1.8   Notes     incr GLV at [place]  missed dose bruising        Date 8/15 8/26 9/3 9/10 9/24 10/1 10/8 10/15 10/21 10/28 11/4   Total Weekly Dose 25mg 25mg 25mg 25mg 25mg 26.25  mg 25mg 21.25  mg 22.5mg 23.75  mg 23.75  mg   INR 3.2 2.6 2.6 2.4 1.6 2.3 4.8 (POCT)  3.75 (ryan) 4.0 2.3 2.5 2.4   Notes      1x boost          Date 11/11 11/15 11/21 12/3  12/10 12/17        Total Weekly Dose 23.75  mg 23.75  mg 25mg 25mg  25 mg 23.75mg        INR 1.9 1.9 2.4 3.1  3.3 3.4        Notes   Boost x1  less GLV            Phone Interview:  Tablet Strength: 2.5mg tablet  Patient Contact Info: Mikayla Chaves, Cell (daughter) [phone]  [facility] in [place] Contact: Christiana (or Hawa) [phone] **preferred**    Patient Findings     CHRISTIANA IS CALLING US BACK        Plan:  1. INR is slightly SUPRAtherapeutic today at 3.3. Instructed Christiana to  HOLD today's dose then continue warfarin 3.75mg oral daily except 2.5mg on TuesFri until recheck  2. Repeat INR in 1 week on 12/17  3. Verbal information provided over the phone. Adelaide Deutsch's RN RBV dosing instructions, expresses understanding by teach back, and has no further questions at this time.

## 2019-12-17 NOTE — PROGRESS NOTES
Anticoagulation Clinic - Remote Progress Note  [facility]  Frequency of Monitorin-4x a a month    Indication: chronic afib  Referring Provider: Luiza  Goal INR: 2.0-3.0  Current Drug Interactions: Omeprazole, MVI, CoQ10, Levothyroxine, celexa  CHADS-VASc: 4 [HTN, Age>75, Female]  HAS-BLED: 2 (HTN, Elderly)  Clinic: 2018    Diet: Green beans, glenroy slaw once week, iceberg salad ~2x a week (10/21/19)  Alcohol: None  Tobacco: None  OTC Pain Medication: Tylenol    INR History:  Date 12/11 12/20 1/3/18 1/18 1/31 2/14 2/22 2/28 3/7 3/23 4/2 4/11 4/25   Total Weekly Dose 27.5 mg 27.5 mg 27.5  mg 27.5 mg   27.5mg 27.5 mg 25mg 27.5mg 27.5  mg 27.5 mg 27.5mg 27.5 mg 27.5  mg   INR 2.7 3.0 3.0 2.7 2.5 3.5 2.2 2.9 2.4 3.1 2.2 2.9 2.8   Notes       Held 1x    1 decr dose       Date 5/10 5/17 5/23 6/6 6/13 6/20 6/22 6/27 7/1 7/5   Total Weekly Dose 25mg 27.5mg 27.5mg 26.25mg 27.5mg 25mg 20mg 20mg 20mg 17.5mg   INR 1.9 2.3 2.4 3.1 3.8; 4.0 3.9 2.4 3.2 2.1 2.6   Notes missed 1x dose, self adj   Less GLV; daughter [place] clinic; Less GLV    More GLV; dose decr.      Date 7/11 7/18 7/25 8/1 8/7 8/15 8/22 9/5 9/10 9/17   Total Weekly Dose 17.5mg 18.75mg 18.75mg 20mg 22.5mg 20mg 20mg 20mg 22.5mg 22.5mg   INR 1.9 1.7 2.0 1.5 1.8 2.1 2.1 1.5 1.7 1.5   Notes     boost boost   boost      Date 9/24 10/1 10/15 10/22 10/29 11/5 11/19 11/26 12/10 12/24 12/31   Total Weekly Dose 23.75  mg 22.5mg 22.5mg 18.75mg 22.5mg 23.75mg 23.75mg 23.75mg 23.75mg 22.5mg 23.75mg   INR 2.1 2.1 2.7 1.8 1.9 2.1 2.4 2.5 2.2 1.9 2.3   Notes boost   missed 10/15 x 1  1x incr dose    Mis-dose      Date 1/14/19 1/28 2/11 2/22 2/28 3/7 3/20 3/27 4/10 4/24 5/1   Total Weekly Dose 23.75  mg 23.75  mg 23.75  mg 23.75  mg 23.75  mg 23.75  mg 23.75  mg 23.75  mg 23.75  mg 23.75  mg 23.75  mg   INR 2.3 2.0 2.1 2.4 2.8 2.4 2.3 2.8 2.9 1.8 1.9   Notes  1 miss     fall         Date 5/8 5/15 5/22 6/5  6/19 6/26 7/3 7/8 7/15 7/22 8/5   Total Weekly Dose 25mg  23.75  mg 23.75  mg 23.75  mg 23.75  mg 25 mg 23.75mg 25mg 25mg 25mg 25mg   INR 2.2 2.6 2.5 2.1  2.0 2.6 1.5 2.0 2.2 2.1 1.8   Notes     incr GLV at [facility]  missed dose bruising        Date 8/15 8/26 9/3 9/10 9/24 10/1 10/8 10/15 10/21 10/28 11/4   Total Weekly Dose 25mg 25mg 25mg 25mg 25mg 26.25  mg 25mg 21.25  mg 22.5mg 23.75  mg 23.75  mg   INR 3.2 2.6 2.6 2.4 1.6 2.3 4.8 (POCT)  3.75 (ryan) 4.0 2.3 2.5 2.4   Notes      1x boost          Date 11/11 11/15 11/21 12/3 12/10 12/17        Total Weekly Dose 23.75  mg 23.75  mg 25mg 25mg 25mg 23.75  mg        INR 1.9 1.9 2.4 3.1 3.3 3.4        Notes   Boost x1 less GLV   1x hold         Phone Interview:  Tablet Strength: 2.5mg and 7.5mg tablet  Patient Contact Info: Mikayla Chaves, Cell (daughter) [phone]  [facility] in [place] Contact: Christiana (or Hawa) [phone] **preferred**    Patient Findings   Negatives:  Signs/symptoms of thrombosis, Signs/symptoms of bleeding, Laboratory test error suspected, Change in health, Change in alcohol use, Change in activity, Upcoming invasive procedure, Emergency department visit, Upcoming dental procedure, Missed doses, Extra doses, Change in medications, Change in diet/appetite, Hospital admission, Bruising, Other complaints   Comments:  Christiana denies all findings for patient. She reports daughter fills out patient's menu card, says she is aware patient needs to be consistent with GLV intake. Christiana reports patient has RX for 2.5mg and 7.5mg tablets, all medications for patient are dispensed from [facility] internal pharmacy.     Plan:  1. INR is SUPRAtherapeutic today at 3.4 despite dose reduction at previous encounter. After consulting with Rhonda Singh, PharmD, instructed Christiana to have patient HOLD tonight's dose of warfarin and then continue warfarin 3.75mg oral daily except 2.5mg on TuesFri until recheck  2. Repeat INR in ~1 week, 12/23  3. Verbal information provided over the phone. Adelaide Deutsch's RN RBV dosing  instructions, expresses understanding by teach back, and has no further questions at this time.    Loco Valle CPhT  12/17/2019  3:57 PM    [facility], have reviewed the note in full and agree with the assessment and plan.  12/17/19  5:16 PM

## 2019-12-23 NOTE — PROGRESS NOTES
Anticoagulation Clinic - Remote Progress Note  [facility]  Frequency of Monitorin-4x a a month    Indication: chronic afib  Referring Provider: Luiza  Goal INR: 2.0-3.0  Current Drug Interactions: Omeprazole, MVI, CoQ10, Levothyroxine, celexa  CHADS-VASc: 4 [HTN, Age>75, Female]  HAS-BLED: 2 (HTN, Elderly)  Clinic: 2018    Diet: Green beans, glenroy slaw once week, iceberg salad ~2x a week (10/21/19)  Alcohol: None  Tobacco: None  OTC Pain Medication: Tylenol    INR History:  Date 12/11 12/20 1/3/18 1/18 1/31 2/14 2/22 2/28 3/7 3/23 4/2 4/11 4/25   Total Weekly Dose 27.5 mg 27.5 mg 27.5  mg 27.5 mg   27.5mg 27.5 mg 25mg 27.5mg 27.5  mg 27.5 mg 27.5mg 27.5 mg 27.5  mg   INR 2.7 3.0 3.0 2.7 2.5 3.5 2.2 2.9 2.4 3.1 2.2 2.9 2.8   Notes       Held 1x    1 decr dose       Date 5/10 5/17 5/23 6/6 6/13 6/20 6/22 6/27 7/1 7/5   Total Weekly Dose 25mg 27.5mg 27.5mg 26.25mg 27.5mg 25mg 20mg 20mg 20mg 17.5mg   INR 1.9 2.3 2.4 3.1 3.8; 4.0 3.9 2.4 3.2 2.1 2.6   Notes missed 1x dose, self adj   Less GLV; daughter [facility]; Less GLV    More GLV; dose decr.      Date 7/11 7/18 7/25 8/1 8/7 8/15 8/22 9/5 9/10 9/17   Total Weekly Dose 17.5mg 18.75mg 18.75mg 20mg 22.5mg 20mg 20mg 20mg 22.5mg 22.5mg   INR 1.9 1.7 2.0 1.5 1.8 2.1 2.1 1.5 1.7 1.5   Notes     boost boost   boost      Date 9/24 10/1 10/15 10/22 10/29 11/5 11/19 11/26 12/10 12/24 12/31   Total Weekly Dose 23.75  mg 22.5mg 22.5mg 18.75mg 22.5mg 23.75mg 23.75mg 23.75mg 23.75mg 22.5mg 23.75mg   INR 2.1 2.1 2.7 1.8 1.9 2.1 2.4 2.5 2.2 1.9 2.3   Notes boost   missed 10/15 x 1  1x incr dose    Mis-dose      Date 1/14/19 1/28 2/11 2/22 2/28 3/7 3/20 3/27 4/10 4/24 5/1   Total Weekly Dose 23.75  mg 23.75  mg 23.75  mg 23.75  mg 23.75  mg 23.75  mg 23.75  mg 23.75  mg 23.75  mg 23.75  mg 23.75  mg   INR 2.3 2.0 2.1 2.4 2.8 2.4 2.3 2.8 2.9 1.8 1.9   Notes  1 miss     fall         Date 5/8 5/15 5/22 6/5  6/19 6/26 7/3 7/8 7/15 7/22 8/5   Total Weekly Dose 25mg  23.75  mg 23.75  mg 23.75  mg 23.75  mg 25 mg 23.75mg 25mg 25mg 25mg 25mg   INR 2.2 2.6 2.5 2.1  2.0 2.6 1.5 2.0 2.2 2.1 1.8   Notes     incr GLV at [place]  missed dose bruising        Date 8/15 8/26 9/3 9/10 9/24 10/1 10/8 10/15 10/21 10/28 11/4   Total Weekly Dose 25mg 25mg 25mg 25mg 25mg 26.25  mg 25mg 21.25  mg 22.5mg 23.75  mg 23.75  mg   INR 3.2 2.6 2.6 2.4 1.6 2.3 4.8 (POCT)  3.75 (ryan) 4.0 2.3 2.5 2.4   Notes      1x boost          Date 11/11 11/15 11/21 12/3 12/10 12/17  12/23       Total Weekly Dose 23.75  mg 23.75  mg 25mg 25mg 25mg 23.75  mg  21.25 mg       INR 1.9 1.9 2.4 3.1 3.3 3.4  3.5       Notes   Boost x1 less GLV   1x hold         Phone Interview:  Tablet Strength: 2.5mg and 7.5mg tablet  Patient Contact Info: Mikayla Chaves, Cell (daughter) [phone]  [facility] in [place] Contact: Christiana (or Hawa) [phone] **preferred**    Patient Findings   Negatives:  Signs/symptoms of thrombosis, Signs/symptoms of bleeding, Laboratory test error suspected, Change in health, Change in alcohol use, Change in activity, Upcoming invasive procedure, Emergency department visit, Upcoming dental procedure, Missed doses, Extra doses, Change in medications, Change in diet/appetite, Hospital admission, Bruising, Other complaints   Comments:  Christiana denies any changes; above findings negative       Plan:  1. INR is SUPRAtherapeutic today at 3.5 despite dose hold.  Instructed Christiana to have patient take warfarin 2.5 mg oral daily except 3.75 mg on SunWedFri until recheck  2. Repeat INR in ~1 week, 12/30  3. Verbal information provided over the phone. Adelaide Deutsch's RN RBV dosing instructions, expresses understanding by teach back, and has no further questions at this time.    Reena Moore, PharmD  12/23/2019  12:12 PM

## 2019-12-30 NOTE — PROGRESS NOTES
Anticoagulation Clinic - Remote Progress Note  [facility]  Frequency of Monitorin-4x a a month    Indication: chronic afib  Referring Provider: Luiza  Goal INR: 2.0-3.0  Current Drug Interactions: Omeprazole, MVI, CoQ10, Levothyroxine, celexa  CHADS-VASc: 4 [HTN, Age>75, Female]  HAS-BLED: 2 (HTN, Elderly)  Clinic: 2018    Diet: Green beans, glenroy slaw once week, iceberg salad ~2x a week (10/21/19)  Alcohol: None  Tobacco: None  OTC Pain Medication: Tylenol    INR History:  Date 12/11 12/20 1/3/18 1/18 1/31 2/14 2/22 2/28 3/7 3/23 4/2 4/11 4/25   Total Weekly Dose 27.5 mg 27.5 mg 27.5  mg 27.5 mg   27.5mg 27.5 mg 25mg 27.5mg 27.5  mg 27.5 mg 27.5mg 27.5 mg 27.5  mg   INR 2.7 3.0 3.0 2.7 2.5 3.5 2.2 2.9 2.4 3.1 2.2 2.9 2.8   Notes       Held 1x    1 decr dose       Date 5/10 5/17 5/23 6/6 6/13 6/20 6/22 6/27 7/1 7/5   Total Weekly Dose 25mg 27.5mg 27.5mg 26.25mg 27.5mg 25mg 20mg 20mg 20mg 17.5mg   INR 1.9 2.3 2.4 3.1 3.8; 4.0 3.9 2.4 3.2 2.1 2.6   Notes missed 1x dose, self adj   Less GLV; daughter [place] [facility]; Less GLV    More GLV; dose decr.      Date 7/11 7/18 7/25 8/1 8/7 8/15 8/22 9/5 9/10 9/17   Total Weekly Dose 17.5mg 18.75mg 18.75mg 20mg 22.5mg 20mg 20mg 20mg 22.5mg 22.5mg   INR 1.9 1.7 2.0 1.5 1.8 2.1 2.1 1.5 1.7 1.5   Notes     boost boost   boost      Date 9/24 10/1 10/15 10/22 10/29 11/5 11/19 11/26 12/10 12/24 12/31   Total Weekly Dose 23.75  mg 22.5mg 22.5mg 18.75mg 22.5mg 23.75mg 23.75mg 23.75mg 23.75mg 22.5mg 23.75mg   INR 2.1 2.1 2.7 1.8 1.9 2.1 2.4 2.5 2.2 1.9 2.3   Notes boost   missed 10/15 x 1  1x incr dose    Mis-dose      Date 1/14/19 1/28 2/11 2/22 2/28 3/7 3/20 3/27 4/10 4/24 5/1   Total Weekly Dose 23.75  mg 23.75  mg 23.75  mg 23.75  mg 23.75  mg 23.75  mg 23.75  mg 23.75  mg 23.75  mg 23.75  mg 23.75  mg   INR 2.3 2.0 2.1 2.4 2.8 2.4 2.3 2.8 2.9 1.8 1.9   Notes  1 miss     fall         Date 5/8 5/15 5/22 6/5  6/19 6/26 7/3 7/8 7/15 7/22 8/5   Total Weekly Dose 25mg  23.75  mg 23.75  mg 23.75  mg 23.75  mg 25 mg 23.75mg 25mg 25mg 25mg 25mg   INR 2.2 2.6 2.5 2.1  2.0 2.6 1.5 2.0 2.2 2.1 1.8   Notes     incr GLV at [place]  missed dose bruising        Date 8/15 8/26 9/3 9/10 9/24 10/1 10/8 10/15 10/21 10/28 11/4   Total Weekly Dose 25mg 25mg 25mg 25mg 25mg 26.25  mg 25mg 21.25  mg 22.5mg 23.75  mg 23.75  mg   INR 3.2 2.6 2.6 2.4 1.6 2.3 4.8 (POCT)  3.75 (ryan) 4.0 2.3 2.5 2.4   Notes      1x boost          Date 11/11 11/15 11/21 12/3 12/10 12/17  12/23 12/30      Total Weekly Dose 23.75  mg 23.75  mg 25mg 25mg 25mg 23.75  mg  21.25 mg 21.25      INR 1.9 1.9 2.4 3.1 3.3 3.4  3.5 2.9      Notes   Boost x1 less GLV   1x hold         Phone Interview:  Tablet Strength: 2.5mg and 7.5mg tablet  Patient Contact Info: Mikayla Chaves, Cell (daughter) [phone]  [facility] in [place] Contact: Christiana (or Hawa) [phone] **preferred**    Patient Findings   Negatives:  Signs/symptoms of thrombosis, Signs/symptoms of bleeding, Laboratory test error suspected, Change in health, Change in alcohol use, Change in activity, Upcoming invasive procedure, Emergency department visit, Upcoming dental procedure, Missed doses, Extra doses, Change in medications, Change in diet/appetite, Hospital admission, Bruising, Other complaints   Comments:  All negative per Christiana       Plan:  1. INR is back WNL but at the [facility].  Instructed Christiana to have patient continue warfarin 2.5 mg oral daily except 3.75 mg on SunWedFri until recheck  2. Repeat INR in ~1 week, 1/6  3. Verbal information provided over the phone. Adelaide Deutsch's RN RBV dosing instructions, expresses understanding by teach back, and has no further questions at this time.    Erinn Aldrich, IvisD.  12/30/19   11:05 AM

## 2020-01-01 ENCOUNTER — ANTICOAGULATION VISIT (OUTPATIENT)
Dept: PHARMACY | Facility: HOSPITAL | Age: 85
End: 2020-01-01

## 2020-01-01 ENCOUNTER — TELEPHONE (OUTPATIENT)
Dept: PHARMACY | Facility: HOSPITAL | Age: 85
End: 2020-01-01

## 2020-01-01 DIAGNOSIS — I48.20 CHRONIC ATRIAL FIBRILLATION (HCC): ICD-10-CM

## 2020-01-01 LAB
INR PPP: 1
INR PPP: 1.3
INR PPP: 1.4
INR PPP: 1.4
INR PPP: 1.5
INR PPP: 1.5
INR PPP: 1.6
INR PPP: 1.7
INR PPP: 1.8
INR PPP: 1.9
INR PPP: 2
INR PPP: 2.1
INR PPP: 2.1
INR PPP: 2.2
INR PPP: 2.3
INR PPP: 2.3
INR PPP: 2.3 (ref 2–3)
INR PPP: 2.4
INR PPP: 2.4
INR PPP: 2.5
INR PPP: 2.6
INR PPP: 2.7
INR PPP: 2.7
INR PPP: 2.83
INR PPP: 2.83
INR PPP: 2.9
INR PPP: 3
INR PPP: 3.1
INR PPP: 3.2
INR PPP: 3.2
INR PPP: 3.3
INR PPP: 3.4
INR PPP: 3.5
INR PPP: 3.6
INR PPP: 3.7
INR PPP: 4.1
INR PPP: 4.5
INR PPP: 4.6

## 2020-01-01 RX ORDER — BUMETANIDE 1 MG/1
1 TABLET ORAL DAILY
COMMUNITY

## 2020-01-06 NOTE — PROGRESS NOTES
Anticoagulation Clinic - Remote Progress Note  [facility] [employer]  Frequency of Monitorin-4x a a month    Indication: chronic afib  Referring Provider: Luiza  Goal INR: 2.0-3.0  Current Drug Interactions: Omeprazole, MVI, CoQ10, Levothyroxine, celexa  CHADS-VASc: 4 [HTN, Age>75, Female]  HAS-BLED: 2 (HTN, Elderly)  Clinic: 2018    Diet: Green beans, glenroy slaw once week, iceberg salad ~2x a week (10/21/19)  Alcohol: None  Tobacco: None  OTC Pain Medication: Tylenol    INR History:  Date 12/11 12/20 1/3/18 1/18 1/31 2/14 2/22 2/28 3/7 3/23 4/2 4/11 4/25   Total Weekly Dose 27.5 mg 27.5 mg 27.5  mg 27.5 mg   27.5mg 27.5 mg 25mg 27.5mg 27.5  mg 27.5 mg 27.5mg 27.5 mg 27.5  mg   INR 2.7 3.0 3.0 2.7 2.5 3.5 2.2 2.9 2.4 3.1 2.2 2.9 2.8   Notes       Held 1x    1 decr dose       Date 5/10 5/17 5/23 6/6 6/13 6/20 6/22 6/27 7/1 7/5   Total Weekly Dose 25mg 27.5mg 27.5mg 26.25mg 27.5mg 25mg 20mg 20mg 20mg 17.5mg   INR 1.9 2.3 2.4 3.1 3.8; 4.0 3.9 2.4 3.2 2.1 2.6   Notes missed 1x dose, self adj   Less GLV; daughter [place] [facility]; Less GLV    More GLV; dose decr.      Date 7/11 7/18 7/25 8/1 8/7 8/15 8/22 9/5 9/10 9/17   Total Weekly Dose 17.5mg 18.75mg 18.75mg 20mg 22.5mg 20mg 20mg 20mg 22.5mg 22.5mg   INR 1.9 1.7 2.0 1.5 1.8 2.1 2.1 1.5 1.7 1.5   Notes     boost boost   boost      Date 9/24 10/1 10/15 10/22 10/29 11/5 11/19 11/26 12/10 12/24 12/31   Total Weekly Dose 23.75  mg 22.5mg 22.5mg 18.75mg 22.5mg 23.75mg 23.75mg 23.75mg 23.75mg 22.5mg 23.75mg   INR 2.1 2.1 2.7 1.8 1.9 2.1 2.4 2.5 2.2 1.9 2.3   Notes boost   missed 10/15 x 1  1x incr dose    Mis-dose      Date 1/14/19 1/28 2/11 2/22 2/28 3/7 3/20 3/27 4/10 4/24 5/1   Total Weekly Dose 23.75  mg 23.75  mg 23.75  mg 23.75  mg 23.75  mg 23.75  mg 23.75  mg 23.75  mg 23.75  mg 23.75  mg 23.75  mg   INR 2.3 2.0 2.1 2.4 2.8 2.4 2.3 2.8 2.9 1.8 1.9   Notes  1 miss     fall         Date 5/8 5/15 5/22 6/5  6/19 6/26 7/3 7/8 7/15 7/22 8/5   Total Weekly Dose 25mg  23.75  mg 23.75  mg 23.75  mg 23.75  mg 25 mg 23.75mg 25mg 25mg 25mg 25mg   INR 2.2 2.6 2.5 2.1  2.0 2.6 1.5 2.0 2.2 2.1 1.8   Notes     incr GLV at [place]  missed dose bruising        Date 8/15 8/26 9/3 9/10 9/24 10/1 10/8 10/15 10/21 10/28 11/4   Total Weekly Dose 25mg 25mg 25mg 25mg 25mg 26.25  mg 25mg 21.25  mg 22.5mg 23.75  mg 23.75  mg   INR 3.2 2.6 2.6 2.4 1.6 2.3 4.8 (POCT)  3.75 (ryan) 4.0 2.3 2.5 2.4   Notes      1x boost          Date 11/11 11/15 11/21 12/3 12/10 12/17  12/23 12/30  1/6     Total Weekly Dose 23.75  mg 23.75  mg 25mg 25mg 25mg 23.75  mg  21.25 mg 21.25  21.25mg     INR 1.9 1.9 2.4 3.1 3.3 3.4  3.5 2.9  4.1     Notes   Boost x1 less GLV   1x hold         Phone Interview:  Tablet Strength: 2.5mg and 7.5mg tablet  Patient Contact Info: Mikayla Chaves, Cell (daughter) [phone]  [facility] in [place] Contact: Christiana (or Hawa) [phone] **preferred**    Patient Findings   Negatives:  Signs/symptoms of thrombosis, Signs/symptoms of bleeding, Laboratory test error suspected, Change in health, Change in alcohol use, Change in activity, Upcoming invasive procedure, Emergency department visit, Upcoming dental procedure, Missed doses, Extra doses, Change in medications, Change in diet/appetite, Hospital admission, Bruising, Other complaints   Comments:  Discussed with Christiana.  She denies any changes.  Above findings negative     Plan:  1. INR is supra therapeutic today at 4.1.  Instructed Christiana to have patient HOLD warfarin today, then tomorrow resume warfarin 2.5 mg oral daily except 3.75 mg on SunWedFri until recheck.     May want to consider reducing maintenance dose to 20 mg / week.   2. Repeat INR on Friday, 1/10  3. Verbal information provided over the phone. Adelaide Deutsch's RN RBV dosing instructions, expresses understanding by teach back, and has no further questions at this time.    Reena Moore, PharmD  01/06/20   10:41 AM

## 2020-01-10 NOTE — PROGRESS NOTES
Anticoagulation Clinic - Remote Progress Note  [facility]  Frequency of Monitorin-4x a a month    Indication: chronic afib  Referring Provider: Luiza  Goal INR: 2.0-3.0  Current Drug Interactions: Omeprazole, MVI, CoQ10, Levothyroxine, celexa  CHADS-VASc: 4 [HTN, Age>75, Female]  HAS-BLED: 2 (HTN, Elderly)  Clinic: 2018    Diet: Green beans, glenroy slaw once week, iceberg salad ~2x a week (10/21/19)  Alcohol: None  Tobacco: None  OTC Pain Medication: Tylenol    INR History:  Date 12/11 12/20 1/3/18 1/18 1/31 2/14 2/22 2/28 3/7 3/23 4/2 4/11 4/25   Total Weekly Dose 27.5 mg 27.5 mg 27.5  mg 27.5 mg   27.5mg 27.5 mg 25mg 27.5mg 27.5  mg 27.5 mg 27.5mg 27.5 mg 27.5  mg   INR 2.7 3.0 3.0 2.7 2.5 3.5 2.2 2.9 2.4 3.1 2.2 2.9 2.8   Notes       Held 1x    1 decr dose       Date 5/10 5/17 5/23 6/6 6/13 6/20 6/22 6/27 7/1 7/5   Total Weekly Dose 25mg 27.5mg 27.5mg 26.25mg 27.5mg 25mg 20mg 20mg 20mg 17.5mg   INR 1.9 2.3 2.4 3.1 3.8; 4.0 3.9 2.4 3.2 2.1 2.6   Notes missed 1x dose, self adj   Less GLV; daughter [place] [facility]; Less GLV    More GLV; dose decr.      Date 7/11 7/18 7/25 8/1 8/7 8/15 8/22 9/5 9/10 9/17   Total Weekly Dose 17.5mg 18.75mg 18.75mg 20mg 22.5mg 20mg 20mg 20mg 22.5mg 22.5mg   INR 1.9 1.7 2.0 1.5 1.8 2.1 2.1 1.5 1.7 1.5   Notes     boost boost   boost      Date 9/24 10/1 10/15 10/22 10/29 11/5 11/19 11/26 12/10 12/24 12/31   Total Weekly Dose 23.75  mg 22.5mg 22.5mg 18.75mg 22.5mg 23.75mg 23.75mg 23.75mg 23.75mg 22.5mg 23.75mg   INR 2.1 2.1 2.7 1.8 1.9 2.1 2.4 2.5 2.2 1.9 2.3   Notes boost   missed 10/15 x 1  1x incr dose    Mis-dose      Date 1/14/19 1/28 2/11 2/22 2/28 3/7 3/20 3/27 4/10 4/24 5/1   Total Weekly Dose 23.75  mg 23.75  mg 23.75  mg 23.75  mg 23.75  mg 23.75  mg 23.75  mg 23.75  mg 23.75  mg 23.75  mg 23.75  mg   INR 2.3 2.0 2.1 2.4 2.8 2.4 2.3 2.8 2.9 1.8 1.9   Notes  1 miss     fall         Date 5/8 5/15 5/22 6/5  6/19 6/26 7/3 7/8 7/15 7/22 8/5   Total Weekly Dose 25mg  23.75  mg 23.75  mg 23.75  mg 23.75  mg 25 mg 23.75mg 25mg 25mg 25mg 25mg   INR 2.2 2.6 2.5 2.1  2.0 2.6 1.5 2.0 2.2 2.1 1.8   Notes     incr GLV at [facility]  missed dose bruising        Date 8/15 8/26 9/3 9/10 9/24 10/1 10/8 10/15 10/21 10/28 11/4   Total Weekly Dose 25mg 25mg 25mg 25mg 25mg 26.25  mg 25mg 21.25  mg 22.5mg 23.75  mg 23.75  mg   INR 3.2 2.6 2.6 2.4 1.6 2.3 4.8 (POCT)  3.75 (ryan) 4.0 2.3 2.5 2.4   Notes      1x boost          Date 11/11 11/15 11/21 12/3 12/10 12/17  12/23 12/30  1/6 1/10    Total Weekly Dose 23.75  mg 23.75  mg 25mg 25mg 25mg 23.75  mg  21.25 mg 21.25  21.25mg 18.75    INR 1.9 1.9 2.4 3.1 3.3 3.4  3.5 2.9  4.1 3.0    Notes   Boost x1 less GLV   1x hold   1x hold      Phone Interview:  Tablet Strength: 2.5mg and 7.5mg tablet  Patient Contact Info: Mikayla Chaves, Cell (daughter) [phone]  [facility] in [place] Contact: Christiana (or Hawa) [phone] **preferred**    Patient Findings     Negatives:  Signs/symptoms of thrombosis, Signs/symptoms of bleeding, Laboratory test error suspected, Change in health, Change in alcohol use, Change in activity, Upcoming invasive procedure, Emergency department visit, Upcoming dental procedure, Missed doses, Extra doses, Change in medications, Change in diet/appetite, Hospital admission, Bruising, Other complaints   Comments:  Spoke to Christiana at the [facility]. All findings negative, updated new dose in computer system.       Plan:  1. INR is therapeutic today at 3.0.  Instructed Christiana to have patient take 2.5 mg of warfarin today, then tomorrow resume warfarin 2.5 mg oral daily except 3.75 mg on SunWed until recheck.  New weekly dose 20 mg (5.9% decrease).  2. Repeat INR in one week on 1/17.  Will update tracker to reflect new weekly dose if patient remains therapeutic.  3. Verbal information provided over the phone. Adelaide Deutsch's RN RBV dosing instructions, expresses understanding by teach back, and has no further questions at this time.      Rosalina  Jason, PharmD  Pharmacy Resident  1/10/2020  1:38 PM

## 2020-01-17 NOTE — PROGRESS NOTES
Anticoagulation Clinic - Remote Progress Note  [facility]  Frequency of Monitorin-4x a a month    Indication: chronic afib  Referring Provider: Luiza  Goal INR: 2.0-3.0  Current Drug Interactions: Omeprazole, MVI, CoQ10, Levothyroxine, celexa  CHADS-VASc: 4 [HTN, Age>75, Female]  HAS-BLED: 2 (HTN, Elderly)  Clinic: 2018    Diet: Green beans, glenroy slaw once week, iceberg salad ~2x a week (10/21/19)  Alcohol: None  Tobacco: None  OTC Pain Medication: Tylenol    INR History:  Date 12/11 12/20 1/3/18 1/18 1/31 2/14 2/22 2/28 3/7 3/23 4/2 4/11 4/25   Total Weekly Dose 27.5 mg 27.5 mg 27.5  mg 27.5 mg   27.5mg 27.5 mg 25mg 27.5mg 27.5  mg 27.5 mg 27.5mg 27.5 mg 27.5  mg   INR 2.7 3.0 3.0 2.7 2.5 3.5 2.2 2.9 2.4 3.1 2.2 2.9 2.8   Notes       Held 1x    1 decr dose       Date 5/10 5/17 5/23 6/6 6/13 6/20 6/22 6/27 7/1 7/5   Total Weekly Dose 25mg 27.5mg 27.5mg 26.25mg 27.5mg 25mg 20mg 20mg 20mg 17.5mg   INR 1.9 2.3 2.4 3.1 3.8; 4.0 3.9 2.4 3.2 2.1 2.6   Notes missed 1x dose, self adj   Less GLV; daughter [place] clinic; Less GLV    More GLV; dose decr.      Date 7/11 7/18 7/25 8/1 8/7 8/15 8/22 9/5 9/10 9/17   Total Weekly Dose 17.5mg 18.75mg 18.75mg 20mg 22.5mg 20mg 20mg 20mg 22.5mg 22.5mg   INR 1.9 1.7 2.0 1.5 1.8 2.1 2.1 1.5 1.7 1.5   Notes     boost boost   boost      Date 9/24 10/1 10/15 10/22 10/29 11/5 11/19 11/26 12/10 12/24 12/31   Total Weekly Dose 23.75  mg 22.5mg 22.5mg 18.75mg 22.5mg 23.75mg 23.75mg 23.75mg 23.75mg 22.5mg 23.75mg   INR 2.1 2.1 2.7 1.8 1.9 2.1 2.4 2.5 2.2 1.9 2.3   Notes boost   missed 10/15 x 1  1x incr dose    Mis-dose      Date 1/14/19 1/28 2/11 2/22 2/28 3/7 3/20 3/27 4/10 4/24 5/1   Total Weekly Dose 23.75  mg 23.75  mg 23.75  mg 23.75  mg 23.75  mg 23.75  mg 23.75  mg 23.75  mg 23.75  mg 23.75  mg 23.75  mg   INR 2.3 2.0 2.1 2.4 2.8 2.4 2.3 2.8 2.9 1.8 1.9   Notes  1 miss     fall         Date 5/8 5/15 5/22 6/5  6/19 6/26 7/3 7/8 7/15 7/22 8/5   Total Weekly Dose 25mg  23.75  mg 23.75  mg 23.75  mg 23.75  mg 25 mg 23.75mg 25mg 25mg 25mg 25mg   INR 2.2 2.6 2.5 2.1  2.0 2.6 1.5 2.0 2.2 2.1 1.8   Notes     incr GLV at [place]  missed dose bruising        Date 8/15 8/26 9/3 9/10 9/24 10/1 10/8 10/15 10/21 10/28 11/4   Total Weekly Dose 25mg 25mg 25mg 25mg 25mg 26.25  mg 25mg 21.25  mg 22.5mg 23.75  mg 23.75  mg   INR 3.2 2.6 2.6 2.4 1.6 2.3 4.8 (POCT)  3.75 (ryan) 4.0 2.3 2.5 2.4   Notes      1x boost          Date 11/11 11/15 11/21 12/3 12/10 12/17  12/23 12/30  1/6 1/10 1/17   Total Weekly Dose 23.75  mg 23.75  mg 25mg 25mg 25mg 23.75  mg  21.25 mg 21.25  21.25mg 18.75 20 mg   INR 1.9 1.9 2.4 3.1 3.3 3.4  3.5 2.9  4.1 3.0 2.3   Notes   Boost x1 less GLV   1x hold   1x hold      Phone Interview:  Tablet Strength: 2.5mg and 7.5mg tablet  Patient Contact Info: Mikayla Chaves, Cell (daughter) [phone]  [facility] in [place] Contact: Christiana (or Hawa) [phone] **preferred**    Patient Findings     Negatives:  Signs/symptoms of thrombosis, Signs/symptoms of bleeding, Laboratory test error suspected, Change in health, Change in alcohol use, Change in activity, Upcoming invasive procedure, Emergency department visit, Upcoming dental procedure, Missed doses, Extra doses, Change in medications, Change in diet/appetite, Hospital admission, Bruising, Other complaints   Comments:  Spoke with Christiana, denies all findings and agrees to re-test next week.     Plan:  1. INR is therapeutic today at 2.3, although this is a decrease from last week.  Instructed Christiana to have patient continue 2.5 mg daily except 3.75 mg on SunWed until recheck.   2. Repeat INR in one week on 1/24.   3. Verbal information provided over the phone. Adelaide Deutsch's RN RBV dosing instructions, expresses understanding by teach back, and has no further questions at this time.      Rosalina Gomez, PharmD  Pharmacy Resident  1/17/2020  1:13 PM

## 2020-01-24 NOTE — PROGRESS NOTES
Anticoagulation Clinic - Remote Progress Note  [facility]  Frequency of Monitorin-4x a a month    Indication: chronic afib  Referring Provider: Luiza (last seen 19  next visit 20)  Goal INR: 2.0-3.0  Current Drug Interactions: Omeprazole, MVI, CoQ10, Levothyroxine, celexa  CHADS-VASc: 4 [HTN, Age>75, Female]  HAS-BLED: 2 (HTN, Elderly)  Clinic: 2018    Diet: Green beans, glenroy slaw once week, iceberg salad ~2x a week (10/21/19)  Alcohol: None  Tobacco: None  OTC Pain Medication: Tylenol    INR History:  Date 12/11 12/20 1/3/18 1/18 1/31 2/14 2/22 2/28 3/7 3/23 4/2 4/11 4/25   Total Weekly Dose 27.5 mg 27.5 mg 27.5  mg 27.5 mg   27.5mg 27.5 mg 25mg 27.5mg 27.5  mg 27.5 mg 27.5mg 27.5 mg 27.5  mg   INR 2.7 3.0 3.0 2.7 2.5 3.5 2.2 2.9 2.4 3.1 2.2 2.9 2.8   Notes       Held 1x    1 decr dose       Date 5/10 5/17 5/23 6/6 6/13 6/20 6/22 6/27 7/1 7/5   Total Weekly Dose 25mg 27.5mg 27.5mg 26.25mg 27.5mg 25mg 20mg 20mg 20mg 17.5mg   INR 1.9 2.3 2.4 3.1 3.8; 4.0 3.9 2.4 3.2 2.1 2.6   Notes missed 1x dose, self adj   Less GLV; daughter [facility]; Less GLV    More GLV; dose decr.      Date 7/11 7/18 7/25 8/1 8/7 8/15 8/22 9/5 9/10 9/17   Total Weekly Dose 17.5mg 18.75mg 18.75mg 20mg 22.5mg 20mg 20mg 20mg 22.5mg 22.5mg   INR 1.9 1.7 2.0 1.5 1.8 2.1 2.1 1.5 1.7 1.5   Notes     boost boost   boost      Date 9/24 10/1 10/15 10/22 10/29 11/5 11/19 11/26 12/10 12/24 12/31   Total Weekly Dose 23.75  mg 22.5mg 22.5mg 18.75mg 22.5mg 23.75mg 23.75mg 23.75mg 23.75mg 22.5mg 23.75mg   INR 2.1 2.1 2.7 1.8 1.9 2.1 2.4 2.5 2.2 1.9 2.3   Notes boost   missed 10/15 x 1  1x incr dose    Mis-dose      Date 1/14/19 1/28 2/11 2/22 2/28 3/7 3/20 3/27 4/10 4/24 5/1   Total Weekly Dose 23.75  mg 23.75  mg 23.75  mg 23.75  mg 23.75  mg 23.75  mg 23.75  mg 23.75  mg 23.75  mg 23.75  mg 23.75  mg   INR 2.3 2.0 2.1 2.4 2.8 2.4 2.3 2.8 2.9 1.8 1.9   Notes  1 miss     fall         Date 5/8 5/15 5/22 6/5  6/19 6/26 7/3 7/8  7/15 7/22 8/5   Total Weekly Dose 25mg 23.75  mg 23.75  mg 23.75  mg 23.75  mg 25 mg 23.75mg 25mg 25mg 25mg 25mg   INR 2.2 2.6 2.5 2.1  2.0 2.6 1.5 2.0 2.2 2.1 1.8   Notes     incr GLV at [facility]  missed dose bruising        Date 8/15 8/26 9/3 9/10 9/24 10/1 10/8 10/15 10/21 10/28 11/4   Total Weekly Dose 25mg 25mg 25mg 25mg 25mg 26.25  mg 25mg 21.25  mg 22.5mg 23.75  mg 23.75  mg   INR 3.2 2.6 2.6 2.4 1.6 2.3 4.8 (POCT)  3.75 (ryan) 4.0 2.3 2.5 2.4   Notes      1x boost          Date 11/11 11/15 11/21 12/3 12/10 12/17  12/23 12/30  1/6 1/10 1/17   Total Weekly Dose 23.75  mg 23.75  mg 25mg 25mg 25mg 23.75  mg  21.25 mg 21.25  21.25mg 18.75 20 mg   INR 1.9 1.9 2.4 3.1 3.3 3.4  3.5 2.9  4.1 3.0 2.3   Notes   Boost x1 less GLV   1x hold   1x hold      Date 1/24         Total Weekly Dose 20mg         INR 2.4         Notes            Phone Interview:  Tablet Strength: 2.5mg and 7.5mg tablet  Patient Contact Info: Mikayla Chaves, Cell (daughter) [phone]  [facility] in [place] Contact: Christiana (or Hawa) [phone] **preferred**    Patient Findings:  Negatives:  Signs/symptoms of thrombosis, Signs/symptoms of bleeding, Laboratory test error suspected, Change in health, Change in alcohol use, Change in activity, Upcoming invasive procedure, Emergency department visit, Upcoming dental procedure, Missed doses, Extra doses, Change in medications, Change in diet/appetite, Hospital admission, Bruising, Other complaints   Comments:  Spoke with Christiana, caregiver at [facility], who states there have been no changes with Ms. Long since last encounter. Christiana was able to confirm last week's dosing of warfarin.     Plan:  1. INR is therapeutic again today. Instructed Christiana to have patient continue 2.5 mg daily except 3.75 mg on SunWed until recheck.   2. Repeat INR in two weeks.  3. Verbal information provided over the phone. Adelaide Deutsch's RN RBV dosing instructions, expresses understanding by teach back, and has no further questions  at this time.    Eleanor Patino, [place]  1/24/2020  11:44 AM     I, James Mosqueda, [facility], have reviewed the note in full and agree with the assessment and plan.  01/24/20  11:59 AM

## 2020-02-07 NOTE — PROGRESS NOTES
Anticoagulation Clinic - Remote Progress Note  [employer] [employer]  Frequency of Monitorin-4x a a month    Indication: chronic afib  Referring Provider: Luiza (last seen 19  next visit 20)  Goal INR: 2.0-3.0  Current Drug Interactions: Omeprazole, MVI, CoQ10, Levothyroxine, celexa  CHADS-VASc: 4 [HTN, Age>75, Female]  HAS-BLED: 2 (HTN, Elderly)  Clinic: 2018    Diet: Green beans, glenroy slaw once week, iceberg salad ~2x a week (10/21/19)  Alcohol: None  Tobacco: None  OTC Pain Medication: Tylenol    INR History:  Date 12/11 12/20 1/3/18 1/18 1/31 2/14 2/22 2/28 3/7 3/23 4/2 4/11 4/25   Total Weekly Dose 27.5 mg 27.5 mg 27.5  mg 27.5 mg   27.5mg 27.5 mg 25mg 27.5mg 27.5  mg 27.5 mg 27.5mg 27.5 mg 27.5  mg   INR 2.7 3.0 3.0 2.7 2.5 3.5 2.2 2.9 2.4 3.1 2.2 2.9 2.8   Notes       Held 1x    1 decr dose       Date 5/10 5/17 5/23 6/6 6/13 6/20 6/22 6/27 7/1 7/5   Total Weekly Dose 25mg 27.5mg 27.5mg 26.25mg 27.5mg 25mg 20mg 20mg 20mg 17.5mg   INR 1.9 2.3 2.4 3.1 3.8; 4.0 3.9 2.4 3.2 2.1 2.6   Notes missed 1x dose, self adj   Less GLV; daughter [place] clinic; Less GLV    More GLV; dose decr.      Date 7/11 7/18 7/25 8/1 8/7 8/15 8/22 9/5 9/10 9/17   Total Weekly Dose 17.5mg 18.75mg 18.75mg 20mg 22.5mg 20mg 20mg 20mg 22.5mg 22.5mg   INR 1.9 1.7 2.0 1.5 1.8 2.1 2.1 1.5 1.7 1.5   Notes     boost boost   boost      Date 9/24 10/1 10/15 10/22 10/29 11/5 11/19 11/26 12/10 12/24 12/31   Total Weekly Dose 23.75  mg 22.5mg 22.5mg 18.75mg 22.5mg 23.75mg 23.75mg 23.75mg 23.75mg 22.5mg 23.75mg   INR 2.1 2.1 2.7 1.8 1.9 2.1 2.4 2.5 2.2 1.9 2.3   Notes boost   missed 10/15 x 1  1x incr dose    Mis-dose      Date 1/14/19 1/28 2/11 2/22 2/28 3/7 3/20 3/27 4/10 4/24 5/1   Total Weekly Dose 23.75  mg 23.75  mg 23.75  mg 23.75  mg 23.75  mg 23.75  mg 23.75  mg 23.75  mg 23.75  mg 23.75  mg 23.75  mg   INR 2.3 2.0 2.1 2.4 2.8 2.4 2.3 2.8 2.9 1.8 1.9   Notes  1 miss     fall         Date 5/8 5/15 5/22 6/5  6/19 6/26 7/3 7/8  7/15 7/22 8/5   Total Weekly Dose 25mg 23.75  mg 23.75  mg 23.75  mg 23.75  mg 25 mg 23.75mg 25mg 25mg 25mg 25mg   INR 2.2 2.6 2.5 2.1  2.0 2.6 1.5 2.0 2.2 2.1 1.8   Notes     incr GLV at [place]  missed dose bruising        Date 8/15 8/26 9/3 9/10 9/24 10/1 10/8 10/15 10/21 10/28 11/4   Total Weekly Dose 25mg 25mg 25mg 25mg 25mg 26.25  mg 25mg 21.25  mg 22.5mg 23.75  mg 23.75  mg   INR 3.2 2.6 2.6 2.4 1.6 2.3 4.8 (POCT)  3.75 (ryan) 4.0 2.3 2.5 2.4   Notes      1x boost          Date 11/11 11/15 11/21 12/3 12/10 12/17  12/23 12/30  1/6 1/10 1/17   Total Weekly Dose 23.75  mg 23.75  mg 25mg 25mg 25mg 23.75  mg  21.25 mg 21.25  21.25mg 18.75 20 mg   INR 1.9 1.9 2.4 3.1 3.3 3.4  3.5 2.9  4.1 3.0 2.3   Notes   Boost x1 less GLV   1x hold   1x hold      Date 1/24 2/7        Total Weekly Dose 20mg 20mg 21.75mg       INR 2.4 1.9        Notes            Phone Interview:  Tablet Strength: 2.5mg and 7.5mg tablet  Patient Contact Info: Mikayla Chaves, Cell (daughter) [phone]  [facility] in [place] Contact: Christiana (or Hawa) [phone] **preferred**    Patient Findings:  Negatives:  Signs/symptoms of thrombosis, Signs/symptoms of bleeding, Laboratory test error suspected, Change in health, Change in alcohol use, Change in activity, Upcoming invasive procedure, Emergency department visit, Upcoming dental procedure, Missed doses, Extra doses, Change in medications, Change in diet/appetite, Hospital admission, Bruising, Other complaints   Comments:  Spoke with Erinn at [facility]. She denied all findings for the patient.      Plan:  1. INR is SUBtherapeutic today at 1.9. Instructed Erinn to have patient BOOST tonight's dose to 3.75mg then continue 2.5 mg daily except 3.75 mg on SunWed until recheck.   2. Repeat INR in one week. 2/14/20.  3. Verbal information provided over the phone. Adelaide Deutsch's RN RBV dosing instructions, expresses understanding by teach back, and has no further questions at this time.    Verónica MITCHELL  [place], Pharmacy Intern   02/07/20  10:29 AM     IRhonda, PharmD, have reviewed the note in full and agree with the assessment and plan.  02/07/20  11:14 AM

## 2020-02-14 NOTE — PROGRESS NOTES
Anticoagulation Clinic - Remote Progress Note  [employer] [employer]  Frequency of Monitorin-4x a a month    Indication: chronic afib  Referring Provider: Luiza (last seen 19  next visit 20)  Goal INR: 2.0-3.0  Current Drug Interactions: Omeprazole, MVI, CoQ10, Levothyroxine, celexa  CHADS-VASc: 4 [HTN, Age>75, Female]  HAS-BLED: 2 (HTN, Elderly)  Clinic: 2018    Diet: Green beans, glenroy slaw once week, iceberg salad ~2x a week (10/21/19)  Alcohol: None  Tobacco: None  OTC Pain Medication: Tylenol    INR History:  Date 12/11 12/20 1/3/18 1/18 1/31 2/14 2/22 2/28 3/7 3/23 4/2 4/11 4/25   Total Weekly Dose 27.5 mg 27.5 mg 27.5  mg 27.5 mg   27.5mg 27.5 mg 25mg 27.5mg 27.5  mg 27.5 mg 27.5mg 27.5 mg 27.5  mg   INR 2.7 3.0 3.0 2.7 2.5 3.5 2.2 2.9 2.4 3.1 2.2 2.9 2.8   Notes       Held 1x    1 decr dose       Date 5/10 5/17 5/23 6/6 6/13 6/20 6/22 6/27 7/1 7/5   Total Weekly Dose 25mg 27.5mg 27.5mg 26.25mg 27.5mg 25mg 20mg 20mg 20mg 17.5mg   INR 1.9 2.3 2.4 3.1 3.8; 4.0 3.9 2.4 3.2 2.1 2.6   Notes missed 1x dose, self adj   Less GLV; daughter [place] clinic; Less GLV    More GLV; dose decr.      Date 7/11 7/18 7/25 8/1 8/7 8/15 8/22 9/5 9/10 9/17   Total Weekly Dose 17.5mg 18.75mg 18.75mg 20mg 22.5mg 20mg 20mg 20mg 22.5mg 22.5mg   INR 1.9 1.7 2.0 1.5 1.8 2.1 2.1 1.5 1.7 1.5   Notes     boost boost   boost      Date 9/24 10/1 10/15 10/22 10/29 11/5 11/19 11/26 12/10 12/24 12/31   Total Weekly Dose 23.75  mg 22.5mg 22.5mg 18.75mg 22.5mg 23.75mg 23.75mg 23.75mg 23.75mg 22.5mg 23.75mg   INR 2.1 2.1 2.7 1.8 1.9 2.1 2.4 2.5 2.2 1.9 2.3   Notes boost   missed 10/15 x 1  1x incr dose    Mis-dose      Date 1/14/19 1/28 2/11 2/22 2/28 3/7 3/20 3/27 4/10 4/24 5/1   Total Weekly Dose 23.75  mg 23.75  mg 23.75  mg 23.75  mg 23.75  mg 23.75  mg 23.75  mg 23.75  mg 23.75  mg 23.75  mg 23.75  mg   INR 2.3 2.0 2.1 2.4 2.8 2.4 2.3 2.8 2.9 1.8 1.9   Notes  1 miss     fall         Date 5/8 5/15 5/22 6/5  6/19 6/26 7/3 7/8  7/15 7/22 8/5   Total Weekly Dose 25mg 23.75  mg 23.75  mg 23.75  mg 23.75  mg 25 mg 23.75mg 25mg 25mg 25mg 25mg   INR 2.2 2.6 2.5 2.1  2.0 2.6 1.5 2.0 2.2 2.1 1.8   Notes     incr GLV at [place]  missed dose bruising        Date 8/15 8/26 9/3 9/10 9/24 10/1 10/8 10/15 10/21 10/28 11/4   Total Weekly Dose 25mg 25mg 25mg 25mg 25mg 26.25  mg 25mg 21.25  mg 22.5mg 23.75  mg 23.75  mg   INR 3.2 2.6 2.6 2.4 1.6 2.3 4.8 (POCT)  3.75 (ryan) 4.0 2.3 2.5 2.4   Notes      1x boost          Date 11/11 11/15 11/21 12/3 12/10 12/17  12/23 12/30  1/6 1/10 1/17   Total Weekly Dose 23.75  mg 23.75  mg 25mg 25mg 25mg 23.75  mg  21.25 mg 21.25  21.25mg 18.75 20 mg   INR 1.9 1.9 2.4 3.1 3.3 3.4  3.5 2.9  4.1 3.0 2.3   Notes   Boost x1 less GLV   1x hold   1x hold      Date 1/24 2/7 2/14       Total Weekly Dose 20mg 20mg 21.75mg 20mg       INR 2.4 1.9 3.5       Notes   x1 boost          Phone Interview:  Tablet Strength: 2.5mg and 7.5mg tablet  Patient Contact Info: Mikayla Chaves, Cell (daughter) [phone]  [facility] in [place] Contact: Christiana (or Hawa) [phone] **preferred**    Patient Findings:  Negatives:  Signs/symptoms of thrombosis, Signs/symptoms of bleeding, Laboratory test error suspected, Change in health, Change in alcohol use, Change in activity, Upcoming invasive procedure, Emergency department visit, Upcoming dental procedure, Missed doses, Extra doses, Change in medications, Change in diet/appetite, Hospital admission, Bruising, Other complaints   Comments:  MARIANO Shaikh, denied any new findings for the patient. Reports that diet has been consistent and patient's general health remains the same.      Plan:  1. INR is SUPRAtherapeutic today at 3.5. Instructed MARIANO Plasencia, to have patient continue 2.5 mg daily except 3.75 mg on SunWed until recheck.   2. Repeat INR in one week. 2/21/20. If returns WNL, consider resuming Q2W frequency.  3. Verbal information provided over the phone. Adelaide Deutsch's RN RBV dosing  instructions, expresses understanding by teach back, and has no further questions at this time.    Verónica Corea, Pharmacy Intern   02/14/20  2:16 PM       IErinn, formerly [facility], have reviewed the note in full and agree with the assessment and plan.  02/14/20  4:14 PM

## 2020-02-21 NOTE — PROGRESS NOTES
Anticoagulation Clinic - Remote Progress Note  [employer] [employer]  Frequency of Monitorin-4x a a month    Indication: chronic afib  Referring Provider: Luiza (last seen 19  next visit 20)  Goal INR: 2.0-3.0  Current Drug Interactions: Omeprazole, MVI, CoQ10, Levothyroxine, celexa  CHADS-VASc: 4 [HTN, Age>75, Female]  HAS-BLED: 2 (HTN, Elderly)  Clinic: 2018    Diet: Green beans, glenroy slaw once week, iceberg salad ~2x a week (10/21/19)  Alcohol: None  Tobacco: None  OTC Pain Medication: Tylenol    INR History:  Date 12/11 12/20 1/3/18 1/18 1/31 2/14 2/22 2/28 3/7 3/23 4/2 4/11 4/25   Total Weekly Dose 27.5 mg 27.5 mg 27.5  mg 27.5 mg   27.5mg 27.5 mg 25mg 27.5mg 27.5  mg 27.5 mg 27.5mg 27.5 mg 27.5  mg   INR 2.7 3.0 3.0 2.7 2.5 3.5 2.2 2.9 2.4 3.1 2.2 2.9 2.8   Notes       Held 1x    1 decr dose       Date 5/10 5/17 5/23 6/6 6/13 6/20 6/22 6/27 7/1 7/5   Total Weekly Dose 25mg 27.5mg 27.5mg 26.25mg 27.5mg 25mg 20mg 20mg 20mg 17.5mg   INR 1.9 2.3 2.4 3.1 3.8; 4.0 3.9 2.4 3.2 2.1 2.6   Notes missed 1x dose, self adj   Less GLV; daughter [place] clinic; Less GLV    More GLV; dose decr.      Date 7/11 7/18 7/25 8/1 8/7 8/15 8/22 9/5 9/10 9/17   Total Weekly Dose 17.5mg 18.75mg 18.75mg 20mg 22.5mg 20mg 20mg 20mg 22.5mg 22.5mg   INR 1.9 1.7 2.0 1.5 1.8 2.1 2.1 1.5 1.7 1.5   Notes     boost boost   boost      Date 9/24 10/1 10/15 10/22 10/29 11/5 11/19 11/26 12/10 12/24 12/31   Total Weekly Dose 23.75  mg 22.5mg 22.5mg 18.75mg 22.5mg 23.75mg 23.75mg 23.75mg 23.75mg 22.5mg 23.75mg   INR 2.1 2.1 2.7 1.8 1.9 2.1 2.4 2.5 2.2 1.9 2.3   Notes boost   missed 10/15 x 1  1x incr dose    Mis-dose      Date 1/14/19 1/28 2/11 2/22 2/28 3/7 3/20 3/27 4/10 4/24 5/1   Total Weekly Dose 23.75  mg 23.75  mg 23.75  mg 23.75  mg 23.75  mg 23.75  mg 23.75  mg 23.75  mg 23.75  mg 23.75  mg 23.75  mg   INR 2.3 2.0 2.1 2.4 2.8 2.4 2.3 2.8 2.9 1.8 1.9   Notes  1 miss     fall         Date 5/8 5/15 5/22 6/5  6/19 6/26 7/3 7/8  7/15 7/22 8/5   Total Weekly Dose 25mg 23.75  mg 23.75  mg 23.75  mg 23.75  mg 25 mg 23.75mg 25mg 25mg 25mg 25mg   INR 2.2 2.6 2.5 2.1  2.0 2.6 1.5 2.0 2.2 2.1 1.8   Notes     incr GLV at [place]  missed dose bruising        Date 8/15 8/26 9/3 9/10 9/24 10/1 10/8 10/15 10/21 10/28 11/4   Total Weekly Dose 25mg 25mg 25mg 25mg 25mg 26.25  mg 25mg 21.25  mg 22.5mg 23.75  mg 23.75  mg   INR 3.2 2.6 2.6 2.4 1.6 2.3 4.8 (POCT)  3.75 (ryan) 4.0 2.3 2.5 2.4   Notes      1x boost          Date 11/11 11/15 11/21 12/3 12/10 12/17  12/23 12/30  1/6 1/10 1/17   Total Weekly Dose 23.75  mg 23.75  mg 25mg 25mg 25mg 23.75  mg  21.25 mg 21.25  21.25mg 18.75 20 mg   INR 1.9 1.9 2.4 3.1 3.3 3.4  3.5 2.9  4.1 3.0 2.3   Notes   Boost x1 less GLV   1x hold   1x hold      Date 1/24 2/7 2/14 2/21      Total Weekly Dose 20mg 20mg 21.75mg 20mg       INR 2.4 1.9 3.5 1.6      Notes   x1 boost          Phone Interview:  Tablet Strength: 2.5mg and 7.5mg tablet  Patient Contact Info: Mikayla Chaves, Cell (daughter) [phone]  [facility] in [place] Contact: Christiana (or Hawa) [phone] **preferred**    Patient Findings:    Negatives:  Signs/symptoms of thrombosis, Signs/symptoms of bleeding, Laboratory test error suspected, Change in health, Change in alcohol use, Change in activity, Upcoming invasive procedure, Emergency department visit, Upcoming dental procedure, Missed doses, Extra doses, Change in medications, Change in diet/appetite, Hospital admission, Bruising, Other complaints   Comments:  Per Christiana, no missed doses, no GLV, no MVI. Verified weekly dose. All other findings negative     Plan:  1. INR is SUBtherapeutic today at 1.6 following a dose decrease, a significant decline since last check. Instructed MARIANO Villeda, to have patient increase dose to 2.5 mg daily except 3.75 mg on SunWedFri until recheck.   2. Repeat INR in one week. 2/28  3. Verbal information provided over the phone. Adelaide Deutsch's RN RBV dosing instructions, expresses  understanding by teach back, and has no further questions at this time.    Erinn Aldrich, IvisD.  02/21/20   12:04 PM

## 2020-02-28 NOTE — PROGRESS NOTES
Anticoagulation Clinic - Remote Progress Note  [employer] [employer]  Frequency of Monitorin-4x a a month    Indication: chronic afib  Referring Provider: Luiza (last seen 19  next visit 20)  Goal INR: 2.0-3.0  Current Drug Interactions: Omeprazole, MVI, CoQ10, Levothyroxine, celexa  CHADS-VASc: 4 [HTN, Age>75, Female]  HAS-BLED: 2 (HTN, Elderly)  Clinic: 2018    Diet: Green beans, glenroy slaw once week, iceberg salad ~2x a week (20)  Alcohol: None  Tobacco: None  OTC Pain Medication: Tylenol    INR History:  Date 12/11 12/20 1/3/18 1/18 1/31 2/14 2/22 2/28 3/7 3/23 4/2 4/11 4/25   Total Weekly Dose 27.5 mg 27.5 mg 27.5  mg 27.5 mg   27.5mg 27.5 mg 25mg 27.5mg 27.5  mg 27.5 mg 27.5mg 27.5 mg 27.5  mg   INR 2.7 3.0 3.0 2.7 2.5 3.5 2.2 2.9 2.4 3.1 2.2 2.9 2.8   Notes       Held 1x    1 decr dose       Date 5/10 5/17 5/23 6/6 6/13 6/20 6/22 6/27 7/1 7/5   Total Weekly Dose 25mg 27.5mg 27.5mg 26.25mg 27.5mg 25mg 20mg 20mg 20mg 17.5mg   INR 1.9 2.3 2.4 3.1 3.8; 4.0 3.9 2.4 3.2 2.1 2.6   Notes missed 1x dose, self adj   Less GLV; daughter [place] clinic; Less GLV    More GLV; dose decr.      Date 7/11 7/18 7/25 8/1 8/7 8/15 8/22 9/5 9/10 9/17   Total Weekly Dose 17.5mg 18.75mg 18.75mg 20mg 22.5mg 20mg 20mg 20mg 22.5mg 22.5mg   INR 1.9 1.7 2.0 1.5 1.8 2.1 2.1 1.5 1.7 1.5   Notes     boost boost   boost      Date 9/24 10/1 10/15 10/22 10/29 11/5 11/19 11/26 12/10 12/24 12/31   Total Weekly Dose 23.75  mg 22.5mg 22.5mg 18.75mg 22.5mg 23.75mg 23.75mg 23.75mg 23.75mg 22.5mg 23.75mg   INR 2.1 2.1 2.7 1.8 1.9 2.1 2.4 2.5 2.2 1.9 2.3   Notes boost   missed 10/15 x 1  1x incr dose    Mis-dose      Date 1/14/19 1/28 2/11 2/22 2/28 3/7 3/20 3/27 4/10 4/24 5/1   Total Weekly Dose 23.75  mg 23.75  mg 23.75  mg 23.75  mg 23.75  mg 23.75  mg 23.75  mg 23.75  mg 23.75  mg 23.75  mg 23.75  mg   INR 2.3 2.0 2.1 2.4 2.8 2.4 2.3 2.8 2.9 1.8 1.9   Notes  1 miss     fall         Date 5/8 5/15 5/22 6/5  6/19 6/26 7/3 7/8  7/15 7/22 8/5   Total Weekly Dose 25mg 23.75  mg 23.75  mg 23.75  mg 23.75  mg 25 mg 23.75mg 25mg 25mg 25mg 25mg   INR 2.2 2.6 2.5 2.1  2.0 2.6 1.5 2.0 2.2 2.1 1.8   Notes     incr GLV at [place]  missed dose bruising        Date 8/15 8/26 9/3 9/10 9/24 10/1 10/8 10/15 10/21 10/28 11/4   Total Weekly Dose 25mg 25mg 25mg 25mg 25mg 26.25  mg 25mg 21.25  mg 22.5mg 23.75  mg 23.75  mg   INR 3.2 2.6 2.6 2.4 1.6 2.3 4.8 (POCT)  3.75 (ryan) 4.0 2.3 2.5 2.4   Notes      1x boost          Date 11/11 11/15 11/21 12/3 12/10 12/17  12/23 12/30  1/6 1/10 1/17   Total Weekly Dose 23.75  mg 23.75  mg 25mg 25mg 25mg 23.75  mg  21.25 mg 21.25  21.25mg 18.75 20 mg   INR 1.9 1.9 2.4 3.1 3.3 3.4  3.5 2.9  4.1 3.0 2.3   Notes   Boost x1 less GLV   1x hold   1x hold      Date 1/24 2/7 2/14 2/21 2/28     Total Weekly Dose 20mg 20mg 21.75mg 20mg  21.25  mg     INR 2.4 1.9 3.5 1.6 2.4     Notes   x1 boost   doxy       Phone Interview:  Tablet Strength: 2.5mg and 7.5mg tablet  Patient Contact Info: Mikayla Chaves, Cell (daughter) [phone]  [facility] in [place] Contact: Christiana (or Hawa) [phone] **preferred**    Patient Findings:  Positives:  Change in health, Change in medications, Change in diet/appetite, Hospital admission   Negatives:  Signs/symptoms of thrombosis, Signs/symptoms of bleeding, Laboratory test error suspected, Change in alcohol use, Change in activity, Upcoming invasive procedure, Emergency department visit, Upcoming dental procedure, Missed doses, Extra doses, Bruising, Other complaints   Comments:  Spoke with Hawa Ms. Deutsch's RN. She states Ms. Deutsch was admitted to [facility] on Tuesday for CHF and Pneumonia. At discharge she was put onDoxycline 100mg 1BID and will be done on the 5th of March. She was also taken off Lisinopril-HCTZ put on Bumex 1mg 1QD. At [facility] she is now on a renal and cardiac diet.     Plan:  1. INR is back WNL today although significant increase in one week following  dose increase. This is likely due to recent hospital admission and addition of antibiotics. Consulted with Erinn Aldrich, PharmD, and instructed Hawa, MARIANO, to have patient resume maintenance dose of warfarin 2.5mg daily except 3.75mg on SunWed until recheck. (20mg/week)  2. Repeat INR on Tuesday to ensure WNL.  3. Verbal information provided over the phone. Adelaide Deutsch's RN RBV dosing instructions, expresses understanding by teach back, and has no further questions at this time.  4. Have spoke with HI at [facility]. They have faxed records. Updated tracker with INRs while inpatient.    Eleanor Patino CPhT  02/28/20   3:10 PM    Will continue 20mg/week considering large increase from last week and initiation of doxy. May need to return to 21.25mg/week following completion of abx    I, Erinn Aldrich, [facility], have reviewed the note in full and agree with the assessment and plan.  02/28/20  4:27 PM

## 2020-03-03 NOTE — PROGRESS NOTES
Anticoagulation Clinic - Remote Progress Note  [employer] [employer]  Frequency of Monitorin-4x a a month    Indication: chronic afib  Referring Provider: Luiza (last seen 19  next visit 20)  Goal INR: 2.0-3.0  Current Drug Interactions: Omeprazole, MVI, CoQ10, Levothyroxine, celexa  CHADS-VASc: 4 [HTN, Age>75, Female]  HAS-BLED: 2 (HTN, Elderly)  Clinic: 2018    Diet: Green beans, glenroy slaw once week, iceberg salad ~2x a week (20)  Alcohol: None  Tobacco: None  OTC Pain Medication: Tylenol    INR History:  Date 12/11 12/20 1/3/18 1/18 1/31 2/14 2/22 2/28 3/7 3/23 4/2 4/11 4/25   Total Weekly Dose 27.5 mg 27.5 mg 27.5  mg 27.5 mg   27.5mg 27.5 mg 25mg 27.5mg 27.5  mg 27.5 mg 27.5mg 27.5 mg 27.5  mg   INR 2.7 3.0 3.0 2.7 2.5 3.5 2.2 2.9 2.4 3.1 2.2 2.9 2.8   Notes       Held 1x    1 decr dose       Date 5/10 5/17 5/23 6/6 6/13 6/20 6/22 6/27 7/1 7/5   Total Weekly Dose 25mg 27.5mg 27.5mg 26.25mg 27.5mg 25mg 20mg 20mg 20mg 17.5mg   INR 1.9 2.3 2.4 3.1 3.8; 4.0 3.9 2.4 3.2 2.1 2.6   Notes missed 1x dose, self adj   Less GLV; daughter [place] clinic; Less GLV    More GLV; dose decr.      Date 7/11 7/18 7/25 8/1 8/7 8/15 8/22 9/5 9/10 9/17   Total Weekly Dose 17.5mg 18.75mg 18.75mg 20mg 22.5mg 20mg 20mg 20mg 22.5mg 22.5mg   INR 1.9 1.7 2.0 1.5 1.8 2.1 2.1 1.5 1.7 1.5   Notes     boost boost   boost      Date 9/24 10/1 10/15 10/22 10/29 11/5 11/19 11/26 12/10 12/24 12/31   Total Weekly Dose 23.75  mg 22.5mg 22.5mg 18.75mg 22.5mg 23.75mg 23.75mg 23.75mg 23.75mg 22.5mg 23.75mg   INR 2.1 2.1 2.7 1.8 1.9 2.1 2.4 2.5 2.2 1.9 2.3   Notes boost   missed 10/15 x 1  1x incr dose    Mis-dose      Date 1/14/19 1/28 2/11 2/22 2/28 3/7 3/20 3/27 4/10 4/24 5/1   Total Weekly Dose 23.75  mg 23.75  mg 23.75  mg 23.75  mg 23.75  mg 23.75  mg 23.75  mg 23.75  mg 23.75  mg 23.75  mg 23.75  mg   INR 2.3 2.0 2.1 2.4 2.8 2.4 2.3 2.8 2.9 1.8 1.9   Notes  1 miss     fall         Date 5/8 5/15 5/22 6/5  6/19 6/26 7/3 7/8  7/15 7/22 8/5   Total Weekly Dose 25mg 23.75  mg 23.75  mg 23.75  mg 23.75  mg 25 mg 23.75mg 25mg 25mg 25mg 25mg   INR 2.2 2.6 2.5 2.1  2.0 2.6 1.5 2.0 2.2 2.1 1.8   Notes     incr GLV at [place]  missed dose bruising        Date 8/15 8/26 9/3 9/10 9/24 10/1 10/8 10/15 10/21 10/28 11/4   Total Weekly Dose 25mg 25mg 25mg 25mg 25mg 26.25  mg 25mg 21.25  mg 22.5mg 23.75  mg 23.75  mg   INR 3.2 2.6 2.6 2.4 1.6 2.3 4.8 (POCT)  3.75 (ryan) 4.0 2.3 2.5 2.4   Notes      1x boost          Date 11/11 11/15 11/21 12/3 12/10 12/17  12/23 12/30  1/6 1/10 1/17   Total Weekly Dose 23.75  mg 23.75  mg 25mg 25mg 25mg 23.75  mg  21.25 mg 21.25  21.25mg 18.75 20 mg   INR 1.9 1.9 2.4 3.1 3.3 3.4  3.5 2.9  4.1 3.0 2.3   Notes   Boost x1 less GLV   1x hold   1x hold      Date 1/24 2/7 2/14 2/21 2/28 3/3        Total Weekly Dose 20mg 20mg 21.75mg 20mg  21.25  mg 20 mg        INR 2.4 1.9 3.5 1.6 2.4 3.6        Notes   x1 boost   Doxy bumex  D/c lasix  doxy          Phone Interview:  Tablet Strength: 2.5mg and 7.5mg tablet  Patient Contact Info: Mikayla Chaves, Cell (daughter) [phone]  [facility] [place] Contact: Christiana (or Hawa) [phone] **preferred**    Patient Findings:  Positives:  Change in health, Change in medications, Change in diet/appetite, Hospital admission   Negatives:  Signs/symptoms of thrombosis, Signs/symptoms of bleeding, Laboratory test error suspected, Change in alcohol use, Change in activity, Upcoming invasive procedure, Emergency department visit, Upcoming dental procedure, Missed doses, Extra doses, Bruising, Other complaints   Comments:  [facility] Doxycline 100mg 1BID and will be done on the 5th of March. She was also taken off Lisinopril-HCTZ put on Bumex 1mg 1QD. At [facility] she is now on a renal and cardiac diet.     Plan:  1. INR is supratherapeutic at 3.6 after discharge from hospital and continuing doxycyline. Instructed MARIANO Sawyer, to have patient reduce  tonight's dose to 1.25 mg warfarin then resume maintenance dose of warfarin 2.5mg daily except 3.75mg on SunWed until recheck.   2. Repeat INR on Friday to ensure INR trend is correct due to doxy and age of patient.   3. Verbal information provided over the phone. Adelaide Deutsch's RN RBV dosing instructions, expresses understanding by teach back, and has no further questions at this time.      Loco Freedman [facility], PharmD  03/03/20   1:20 PM      Per discharge records received,   Pt furosemide d/c'd 2/26 + started on bumex    Erinn Aldrich, PharmD.  03/04/20   3:08 PM

## 2020-03-06 NOTE — PROGRESS NOTES
Anticoagulation Clinic - Remote Progress Note  [facility] [employer]  Frequency of Monitorin-4x a a month    Indication: chronic afib  Referring Provider: Luiza (last seen 19  next visit 20)  Goal INR: 2.0-3.0  Current Drug Interactions: Omeprazole, MVI, CoQ10, Levothyroxine, celexa  CHADS-VASc: 4 [HTN, Age>75, Female]  HAS-BLED: 2 (HTN, Elderly)  Clinic: 2018    Diet: Green beans, glenroy slaw once week, iceberg salad ~2x a week (20)  Alcohol: None  Tobacco: None  OTC Pain Medication: Tylenol    INR History:  Date 12/11 12/20 1/3/18 1/18 1/31 2/14 2/22 2/28 3/7 3/23 4/2 4/11 4/25   Total Weekly Dose 27.5 mg 27.5 mg 27.5  mg 27.5 mg   27.5mg 27.5 mg 25mg 27.5mg 27.5  mg 27.5 mg 27.5mg 27.5 mg 27.5  mg   INR 2.7 3.0 3.0 2.7 2.5 3.5 2.2 2.9 2.4 3.1 2.2 2.9 2.8   Notes       Held 1x    1 decr dose       Date 5/10 5/17 5/23 6/6 6/13 6/20 6/22 6/27 7/1 7/5   Total Weekly Dose 25mg 27.5mg 27.5mg 26.25mg 27.5mg 25mg 20mg 20mg 20mg 17.5mg   INR 1.9 2.3 2.4 3.1 3.8; 4.0 3.9 2.4 3.2 2.1 2.6   Notes missed 1x dose, self adj   Less GLV; daughter [facility]; Less GLV    More GLV; dose decr.      Date 7/11 7/18 7/25 8/1 8/7 8/15 8/22 9/5 9/10 9/17   Total Weekly Dose 17.5mg 18.75mg 18.75mg 20mg 22.5mg 20mg 20mg 20mg 22.5mg 22.5mg   INR 1.9 1.7 2.0 1.5 1.8 2.1 2.1 1.5 1.7 1.5   Notes     boost boost   boost      Date 9/24 10/1 10/15 10/22 10/29 11/5 11/19 11/26 12/10 12/24 12/31   Total Weekly Dose 23.75  mg 22.5mg 22.5mg 18.75mg 22.5mg 23.75mg 23.75mg 23.75mg 23.75mg 22.5mg 23.75mg   INR 2.1 2.1 2.7 1.8 1.9 2.1 2.4 2.5 2.2 1.9 2.3   Notes boost   missed 10/15 x 1  1x incr dose    Mis-dose      Date 1/14/19 1/28 2/11 2/22 2/28 3/7 3/20 3/27 4/10 4/24 5/1   Total Weekly Dose 23.75  mg 23.75  mg 23.75  mg 23.75  mg 23.75  mg 23.75  mg 23.75  mg 23.75  mg 23.75  mg 23.75  mg 23.75  mg   INR 2.3 2.0 2.1 2.4 2.8 2.4 2.3 2.8 2.9 1.8 1.9   Notes  1 miss     fall         Date 5/8 5/15 5/22 6/5  6/19 6/26 7/3 7/8  7/15 7/22 8/5   Total Weekly Dose 25mg 23.75  mg 23.75  mg 23.75  mg 23.75  mg 25 mg 23.75mg 25mg 25mg 25mg 25mg   INR 2.2 2.6 2.5 2.1  2.0 2.6 1.5 2.0 2.2 2.1 1.8   Notes     incr GLV at [place]  missed dose bruising        Date 8/15 8/26 9/3 9/10 9/24 10/1 10/8 10/15 10/21 10/28 11/4   Total Weekly Dose 25mg 25mg 25mg 25mg 25mg 26.25  mg 25mg 21.25  mg 22.5mg 23.75  mg 23.75  mg   INR 3.2 2.6 2.6 2.4 1.6 2.3 4.8 (POCT)  3.75 (ryan) 4.0 2.3 2.5 2.4   Notes      1x boost          Date 11/11 11/15 11/21 12/3 12/10 12/17  12/23 12/30  1/6 1/10 1/17   Total Weekly Dose 23.75  mg 23.75  mg 25mg 25mg 25mg 23.75  mg  21.25 mg 21.25  21.25mg 18.75 20 mg   INR 1.9 1.9 2.4 3.1 3.3 3.4  3.5 2.9  4.1 3.0 2.3   Notes   Boost x1 less GLV   1x hold   1x hold      Date 1/24 2/7 2/14 2/21 2/28 3/3 3/6       Total Weekly Dose 20mg 20mg 21.75 mg 20mg  21.25  mg 20 mg 18.75 mg       INR 2.4 1.9 3.5 1.6 2.4 3.6 4.6       Notes      doxy Bumex,  d/c lasix  doxy doxy complete 3/5 1 hold        Phone Interview:  Tablet Strength: 2.5mg and 7.5mg tablet  Patient Contact Info: Mikayla Chaves, Cell (daughter) [phone]  [facility] in [place] Contact: Christiana (or Hawa) [phone] **preferred**    Patient Findings:  Positives:  Change in medications   Negatives:  Signs/symptoms of thrombosis, Signs/symptoms of bleeding, Laboratory test error suspected, Change in health, Change in alcohol use, Change in activity, Upcoming invasive procedure, Emergency department visit, Upcoming dental procedure, Missed doses, Extra doses, Change in diet/appetite, Hospital admission, Bruising, Other complaints   Comments:  Christiana reports Mrs. Deutsch took her last dose of doxycycline yesterday. She has not had any other medication changes, her diet has been unaffected, and she has not complained of or noted any new signs of bruising or bleeding.     Plan:  1. INR is supratherapeutic today, likely related to recent course of doxy. Recommend that Mrs. Deutsch hold her  warfarin tonight and take warfarin 2.5mg daily this weekend.  2. Repeat INR on Monday.   3. Verbal information provided over the phone. Adelaide Deutsch's RN RBV dosing instructions, expresses understanding by teach back, and has no further questions at this time.    Leena Davis, PharmD  3/6/2020  8:17 AM

## 2020-03-09 NOTE — PROGRESS NOTES
Anticoagulation Clinic - Remote Progress Note  [facility] [employer]  Frequency of Monitorin-4x a a month    Indication: chronic afib  Referring Provider: Luiza (last seen 19  next visit 20)  Goal INR: 2.0-3.0  Current Drug Interactions: Omeprazole, MVI, CoQ10, Levothyroxine, celexa  CHADS-VASc: 4 [HTN, Age>75, Female]  HAS-BLED: 2 (HTN, Elderly)  Clinic: 2018    Diet: Green beans, glenroy slaw once week, iceberg salad ~2x a week (20)  Alcohol: None  Tobacco: None  OTC Pain Medication: Tylenol    INR History:  Date 12/11 12/20 1/3/18 1/18 1/31 2/14 2/22 2/28 3/7 3/23 4/2 4/11 4/25   Total Weekly Dose 27.5 mg 27.5 mg 27.5  mg 27.5 mg   27.5mg 27.5 mg 25mg 27.5mg 27.5  mg 27.5 mg 27.5mg 27.5 mg 27.5  mg   INR 2.7 3.0 3.0 2.7 2.5 3.5 2.2 2.9 2.4 3.1 2.2 2.9 2.8   Notes       Held 1x    1 decr dose       Date 5/10 5/17 5/23 6/6 6/13 6/20 6/22 6/27 7/1 7/5   Total Weekly Dose 25mg 27.5mg 27.5mg 26.25mg 27.5mg 25mg 20mg 20mg 20mg 17.5mg   INR 1.9 2.3 2.4 3.1 3.8; 4.0 3.9 2.4 3.2 2.1 2.6   Notes missed 1x dose, self adj   Less GLV; daughter [facility]; Less GLV    More GLV; dose decr.      Date 7/11 7/18 7/25 8/1 8/7 8/15 8/22 9/5 9/10 9/17   Total Weekly Dose 17.5mg 18.75mg 18.75mg 20mg 22.5mg 20mg 20mg 20mg 22.5mg 22.5mg   INR 1.9 1.7 2.0 1.5 1.8 2.1 2.1 1.5 1.7 1.5   Notes     boost boost   boost      Date 9/24 10/1 10/15 10/22 10/29 11/5 11/19 11/26 12/10 12/24 12/31   Total Weekly Dose 23.75  mg 22.5mg 22.5mg 18.75mg 22.5mg 23.75mg 23.75mg 23.75mg 23.75mg 22.5mg 23.75mg   INR 2.1 2.1 2.7 1.8 1.9 2.1 2.4 2.5 2.2 1.9 2.3   Notes boost   missed 10/15 x 1  1x incr dose    Mis-dose      Date 1/14/19 1/28 2/11 2/22 2/28 3/7 3/20 3/27 4/10 4/24 5/1   Total Weekly Dose 23.75  mg 23.75  mg 23.75  mg 23.75  mg 23.75  mg 23.75  mg 23.75  mg 23.75  mg 23.75  mg 23.75  mg 23.75  mg   INR 2.3 2.0 2.1 2.4 2.8 2.4 2.3 2.8 2.9 1.8 1.9   Notes  1 miss     fall         Date 5/8 5/15 5/22 6/5  6/19 6/26 7/3 7/8  7/15 7/22 8/5   Total Weekly Dose 25mg 23.75  mg 23.75  mg 23.75  mg 23.75  mg 25 mg 23.75mg 25mg 25mg 25mg 25mg   INR 2.2 2.6 2.5 2.1  2.0 2.6 1.5 2.0 2.2 2.1 1.8   Notes     incr GLV at [place]  missed dose bruising        Date 8/15 8/26 9/3 9/10 9/24 10/1 10/8 10/15 10/21 10/28 11/4   Total Weekly Dose 25mg 25mg 25mg 25mg 25mg 26.25  mg 25mg 21.25  mg 22.5mg 23.75  mg 23.75  mg   INR 3.2 2.6 2.6 2.4 1.6 2.3 4.8 (POCT)  3.75 (ryan) 4.0 2.3 2.5 2.4   Notes      1x boost          Date 11/11 11/15 11/21 12/3 12/10 12/17  12/23 12/30  1/6 1/10 1/17   Total Weekly Dose 23.75  mg 23.75  mg 25mg 25mg 25mg 23.75  mg  21.25 mg 21.25  21.25mg 18.75 20 mg   INR 1.9 1.9 2.4 3.1 3.3 3.4  3.5 2.9  4.1 3.0 2.3   Notes   Boost x1 less GLV   1x hold   1x hold      Date 1/24 2/7 2/14 2/21 2/28 3/3 3/6 3/9      Total Weekly Dose 20mg 20mg 21.75 mg 20mg  21.25  mg 20 mg 18.75 mg 15mg      INR 2.4 1.9 3.5 1.6 2.4 3.6 4.6 3.4      Notes      doxy Bumex,  d/c lasix  doxy doxy complete 3/5 x1 hold; x1 decr        Phone Interview:  Tablet Strength: 2.5mg and 7.5mg tablet  Patient Contact Info: Mikayla Chaves, Cell (daughter) [phone]  [facility] in [place] Contact: Christiana (or Hawa) [phone] **preferred**    Patient Findings:  Negatives:  Signs/symptoms of thrombosis, Signs/symptoms of bleeding, Laboratory test error suspected, Change in health, Change in alcohol use, Change in activity, Upcoming invasive procedure, Emergency department visit, Upcoming dental procedure, Missed doses, Extra doses, Change in medications, Change in diet/appetite, Hospital admission, Bruising, Other complaints   Comments:  MARIANO Villeda, denies all findings for Ms. Deutsch.      Plan:  1. INR is supratherapeutic again, today at 3.4. Instructed MARIANO Villeda, to have Ms. Deutsch take warfarin 1.25mg tonight, then resume maintenace regimen of warfarin 2.5mg oral daily except 3.75mg WedSun until recheck. (6.3% decrease)  2. Repeat INR on Friday, to ensure WNL.  3. Verbal  information provided over the phone. Adelaide Deutsch's RN RBV dosing instructions, expresses understanding by teach back, and has no further questions at this time.    Rina Corea, PharmD Candidate 2020   3/9/2020  12:00 PM     Erinn SHIN, [facility], have reviewed the note in full and agree with the assessment and plan.  03/09/20  4:58 PM

## 2020-03-13 NOTE — PROGRESS NOTES
Anticoagulation Clinic - Remote Progress Note  [facility] [employer]  Frequency of Monitorin-4x a a month    Indication: chronic afib  Referring Provider: Luiza (last seen 19  next visit 20)  Goal INR: 2.0-3.0  Current Drug Interactions: Omeprazole, MVI, CoQ10, Levothyroxine, celexa,   CHADS-VASc: 4 [HTN, Age>75, Female]  HAS-BLED: 2 (HTN, Elderly)  Clinic: 2018    Diet: Green beans, glenroy slaw once week, iceberg salad ~2x a week (20)  Alcohol: None  Tobacco: None  OTC Pain Medication: Tylenol    INR History:  Date 12/11 12/20 1/3/18 1/18 1/31 2/14 2/22 2/28 3/7 3/23 4/2 4/11 4/25   Total Weekly Dose 27.5 mg 27.5 mg 27.5  mg 27.5 mg   27.5mg 27.5 mg 25mg 27.5mg 27.5  mg 27.5 mg 27.5mg 27.5 mg 27.5  mg   INR 2.7 3.0 3.0 2.7 2.5 3.5 2.2 2.9 2.4 3.1 2.2 2.9 2.8   Notes       Held 1x    1 decr dose       Date 5/10 5/17 5/23 6/6 6/13 6/20 6/22 6/27 7/1 7/5   Total Weekly Dose 25mg 27.5mg 27.5mg 26.25mg 27.5mg 25mg 20mg 20mg 20mg 17.5mg   INR 1.9 2.3 2.4 3.1 3.8; 4.0 3.9 2.4 3.2 2.1 2.6   Notes missed 1x dose, self adj   Less GLV; daughter [place] [facility]; Less GLV    More GLV; dose decr.      Date 7/11 7/18 7/25 8/1 8/7 8/15 8/22 9/5 9/10 9/17   Total Weekly Dose 17.5mg 18.75mg 18.75mg 20mg 22.5mg 20mg 20mg 20mg 22.5mg 22.5mg   INR 1.9 1.7 2.0 1.5 1.8 2.1 2.1 1.5 1.7 1.5   Notes     boost boost   boost      Date 9/24 10/1 10/15 10/22 10/29 11/5 11/19 11/26 12/10 12/24 12/31   Total Weekly Dose 23.75  mg 22.5mg 22.5mg 18.75mg 22.5mg 23.75mg 23.75mg 23.75mg 23.75mg 22.5mg 23.75mg   INR 2.1 2.1 2.7 1.8 1.9 2.1 2.4 2.5 2.2 1.9 2.3   Notes boost   missed 10/15 x 1  1x incr dose    Mis-dose      Date 1/14/19 1/28 2/11 2/22 2/28 3/7 3/20 3/27 4/10 4/24 5/1   Total Weekly Dose 23.75  mg 23.75  mg 23.75  mg 23.75  mg 23.75  mg 23.75  mg 23.75  mg 23.75  mg 23.75  mg 23.75  mg 23.75  mg   INR 2.3 2.0 2.1 2.4 2.8 2.4 2.3 2.8 2.9 1.8 1.9   Notes  1 miss     fall         Date 5/8 5/15 5/22 6/5  6/19 6/26 7/3 7/8  7/15 7/22 8/5   Total Weekly Dose 25mg 23.75  mg 23.75  mg 23.75  mg 23.75  mg 25 mg 23.75mg 25mg 25mg 25mg 25mg   INR 2.2 2.6 2.5 2.1  2.0 2.6 1.5 2.0 2.2 2.1 1.8   Notes     incr GLV at [place]  missed dose bruising        Date 8/15 8/26 9/3 9/10 9/24 10/1 10/8 10/15 10/21 10/28 11/4   Total Weekly Dose 25mg 25mg 25mg 25mg 25mg 26.25  mg 25mg 21.25  mg 22.5mg 23.75  mg 23.75  mg   INR 3.2 2.6 2.6 2.4 1.6 2.3 4.8 (POCT)  3.75 (ryan) 4.0 2.3 2.5 2.4   Notes      1x boost          Date 11/11 11/15 11/21 12/3 12/10 12/17  12/23 12/30  1/6 1/10 1/17   Total Weekly Dose 23.75  mg 23.75  mg 25mg 25mg 25mg 23.75  mg  21.25 mg 21.25  21.25mg 18.75 20 mg   INR 1.9 1.9 2.4 3.1 3.3 3.4  3.5 2.9  4.1 3.0 2.3   Notes   Boost x1 less GLV   1x hold   1x hold      Date 1/24 2/7 2/14 2/21 2/28 3/3 3/6 3/9 3/13     Total Weekly Dose 20mg 20mg 21.75 mg 20mg  21.25  mg 20 mg 18.75 mg 15mg 15mg 16.25mg    INR 2.4 1.9 3.5 1.6 2.4 3.6 4.6 3.4 3.2     Notes      doxy Bumex,  d/c lasix  doxy doxy complete 3/5 x1 hold; x1 decr x1 decr x2 decr      Phone Interview:  Tablet Strength: 2.5mg and 7.5mg tablet  Patient Contact Info: Mikayla Chaves, Cell (daughter) [phone]  [facility] in [place] Contact: Christiana (or Hawa) [phone] **preferred**    Patient Findings:  Negatives:  Signs/symptoms of thrombosis, Signs/symptoms of bleeding, Laboratory test error suspected, Change in health, Change in alcohol use, Change in activity, Upcoming invasive procedure, Emergency department visit, Upcoming dental procedure, Missed doses, Extra doses, Change in medications, Change in diet/appetite, Hospital admission, Bruising, Other complaints   Comments:  Hawa reports Ms. Deutsch received warfarin dosing as instructed. She denies all findings for the patient today.     Plan:  1. INR is supratherapeutic again, today at 3.2, although a decrease from last encounter. Instructed MARIANO Shaikh, to have Ms. Deutsch take warfarin 1.25mg tonight then 2.5mg SatSun until  recheck.  2. Repeat INR Monday, 3/16, to ensure WNL.  3. Verbal information provided over the phone. Adelaide Deutsch's RN RBV dosing instructions, expresses understanding by teach back, and has no further questions at this time.    Rina Corea, PharmD Candidate 2020   3/13/2020  11:21     I, Erinn Aldrich, formerly [facility], have reviewed the note in full and agree with the assessment and plan.  03/13/20  12:11

## 2020-03-16 NOTE — PROGRESS NOTES
Anticoagulation Clinic - Remote Progress Note  [employer] [employer]  Frequency of Monitorin-4x a a month    Indication: chronic afib  Referring Provider: Luiza (last seen 19  next visit 20)  Goal INR: 2.0-3.0  Current Drug Interactions: Omeprazole, MVI, CoQ10, Levothyroxine, celexa,   CHADS-VASc: 4 [HTN, Age>75, Female]  HAS-BLED: 2 (HTN, Elderly)  Clinic: 2018    Diet: Green beans, glenroy slaw once week, iceberg salad ~2x a week (20)  Alcohol: None  Tobacco: None  OTC Pain Medication: Tylenol    INR History:  Date 12/11 12/20 1/3/18 1/18 1/31 2/14 2/22 2/28 3/7 3/23 4/2 4/11 4/25   Total Weekly Dose 27.5 mg 27.5 mg 27.5  mg 27.5 mg   27.5mg 27.5 mg 25mg 27.5mg 27.5  mg 27.5 mg 27.5mg 27.5 mg 27.5  mg   INR 2.7 3.0 3.0 2.7 2.5 3.5 2.2 2.9 2.4 3.1 2.2 2.9 2.8   Notes       Held 1x    1 decr dose       Date 5/10 5/17 5/23 6/6 6/13 6/20 6/22 6/27 7/1 7/5   Total Weekly Dose 25mg 27.5mg 27.5mg 26.25mg 27.5mg 25mg 20mg 20mg 20mg 17.5mg   INR 1.9 2.3 2.4 3.1 3.8; 4.0 3.9 2.4 3.2 2.1 2.6   Notes missed 1x dose, self adj   Less GLV; daughter [place] clinic; Less GLV    More GLV; dose decr.      Date 7/11 7/18 7/25 8/1 8/7 8/15 8/22 9/5 9/10 9/17   Total Weekly Dose 17.5mg 18.75mg 18.75mg 20mg 22.5mg 20mg 20mg 20mg 22.5mg 22.5mg   INR 1.9 1.7 2.0 1.5 1.8 2.1 2.1 1.5 1.7 1.5   Notes     boost boost   boost      Date 9/24 10/1 10/15 10/22 10/29 11/5 11/19 11/26 12/10 12/24 12/31   Total Weekly Dose 23.75  mg 22.5mg 22.5mg 18.75mg 22.5mg 23.75mg 23.75mg 23.75mg 23.75mg 22.5mg 23.75mg   INR 2.1 2.1 2.7 1.8 1.9 2.1 2.4 2.5 2.2 1.9 2.3   Notes boost   missed 10/15 x 1  1x incr dose    Mis-dose      Date 1/14/19 1/28 2/11 2/22 2/28 3/7 3/20 3/27 4/10 4/24 5/1   Total Weekly Dose 23.75  mg 23.75  mg 23.75  mg 23.75  mg 23.75  mg 23.75  mg 23.75  mg 23.75  mg 23.75  mg 23.75  mg 23.75  mg   INR 2.3 2.0 2.1 2.4 2.8 2.4 2.3 2.8 2.9 1.8 1.9   Notes  1 miss     fall         Date 5/8 5/15 5/22 6/5  6/19 6/26 7/3 7/8  7/15 7/22 8/5   Total Weekly Dose 25mg 23.75  mg 23.75  mg 23.75  mg 23.75  mg 25 mg 23.75mg 25mg 25mg 25mg 25mg   INR 2.2 2.6 2.5 2.1  2.0 2.6 1.5 2.0 2.2 2.1 1.8   Notes     incr GLV at [place]  missed dose bruising        Date 8/15 8/26 9/3 9/10 9/24 10/1 10/8 10/15 10/21 10/28 11/4   Total Weekly Dose 25mg 25mg 25mg 25mg 25mg 26.25  mg 25mg 21.25  mg 22.5mg 23.75  mg 23.75  mg   INR 3.2 2.6 2.6 2.4 1.6 2.3 4.8 (POCT)  3.75 (ryan) 4.0 2.3 2.5 2.4   Notes      1x boost          Date 11/11 11/15 11/21 12/3 12/10 12/17  12/23 12/30  1/6 1/10 1/17   Total Weekly Dose 23.75  mg 23.75  mg 25mg 25mg 25mg 23.75  mg  21.25 mg 21.25  21.25mg 18.75 20 mg   INR 1.9 1.9 2.4 3.1 3.3 3.4  3.5 2.9  4.1 3.0 2.3   Notes   Boost x1 less GLV   1x hold   1x hold      Date 1/24 2/7 2/14 2/21 2/28 3/3 3/6 3/9 3/13 3/16    Total Weekly Dose 20mg 20mg 21.75 mg 20mg  21.25  mg 20 mg 18.75 mg 15mg 15mg 16.25mg    INR 2.4 1.9 3.5 1.6 2.4 3.6 4.6 3.4 3.2 4.5    Notes      doxy Bumex,  d/c lasix  doxy doxy complete 3/5 x1 hold; x1 decr x1 decr x2 decr 1x hold     Phone Interview:  Tablet Strength: 2.5mg and 7.5mg tablet  Patient Contact Info: Mikaylaagapito Chaves, Cell (daughter) [phone]  [facility] in [place] Contact: Christiana (or Hawa) [phone] **preferred**      Negatives:  Signs/symptoms of thrombosis, Signs/symptoms of bleeding, Laboratory test error suspected, Change in health, Change in alcohol use, Change in activity, Upcoming invasive procedure, Emergency department visit, Upcoming dental procedure, Missed doses, Extra doses, Change in medications, Change in diet/appetite, Hospital admission, Bruising, Other complaints   Comments:  Per Christiana, Ms Deutsch may have a slightly decreased appetite but no drastic changes. Verified previous dosing.       Plan:  1. INR is supratherapeutic again, today at 4.6, following decreased doses last week. Based on hold trend from 3/6, Instructed MARIANO Shaikh, to have Ms. Deutsch HOLD tonights dose, then take  warfarin 1.25mg tomorrow then 2.5mg Wednesday.  2. Repeat INR Thursday 3/19. May require 2.5mg daily except 1.25mg 2-3x weekly  3. Verbal information provided over the phone. Adelaide Deutsch's RN RBV dosing instructions, expresses understanding by teach back, and has no further questions at this time.  Ivis MatthewsD.  03/16/20   15:02

## 2020-03-17 NOTE — TELEPHONE ENCOUNTER
Nurse for Ms Deutsch called, this morning she noticed Ms Deutsch's eye looked pink, and this afternoon it has become very red. She believes Ms Deutsch has ruptured a blood vessel in her eye from straining when she coughs. She denies any itching, irritation or vision impediment. She does not believe she will be able to get a physician to evaluate due to the facility being on lockdown due to COVID-19 concerns. Agreeable to repeat INR and call results in to clinic at this time.

## 2020-03-17 NOTE — PROGRESS NOTES
Anticoagulation Clinic - Remote Progress Note  [employer] [employer]  Frequency of Monitorin-4x a a month    Indication: chronic afib  Referring Provider: Luiza (last seen 19  next visit 20)  Goal INR: 2.0-3.0  Current Drug Interactions: Omeprazole, MVI, CoQ10, Levothyroxine, celexa,   CHADS-VASc: 4 [HTN, Age>75, Female]  HAS-BLED: 2 (HTN, Elderly)  Clinic: 2018    Diet: Green beans, glenroy slaw once week, iceberg salad ~2x a week (20)  Alcohol: None  Tobacco: None  OTC Pain Medication: Tylenol    INR History:  Date 12/11 12/20 1/3/18 1/18 1/31 2/14 2/22 2/28 3/7 3/23 4/2 4/11 4/25   Total Weekly Dose 27.5 mg 27.5 mg 27.5  mg 27.5 mg   27.5mg 27.5 mg 25mg 27.5mg 27.5  mg 27.5 mg 27.5mg 27.5 mg 27.5  mg   INR 2.7 3.0 3.0 2.7 2.5 3.5 2.2 2.9 2.4 3.1 2.2 2.9 2.8   Notes       Held 1x    1 decr dose       Date 5/10 5/17 5/23 6/6 6/13 6/20 6/22 6/27 7/1 7/5   Total Weekly Dose 25mg 27.5mg 27.5mg 26.25mg 27.5mg 25mg 20mg 20mg 20mg 17.5mg   INR 1.9 2.3 2.4 3.1 3.8; 4.0 3.9 2.4 3.2 2.1 2.6   Notes missed 1x dose, self adj   Less GLV; daughter [place] clinic; Less GLV    More GLV; dose decr.      Date 7/11 7/18 7/25 8/1 8/7 8/15 8/22 9/5 9/10 9/17   Total Weekly Dose 17.5mg 18.75mg 18.75mg 20mg 22.5mg 20mg 20mg 20mg 22.5mg 22.5mg   INR 1.9 1.7 2.0 1.5 1.8 2.1 2.1 1.5 1.7 1.5   Notes     boost boost   boost      Date 9/24 10/1 10/15 10/22 10/29 11/5 11/19 11/26 12/10 12/24 12/31   Total Weekly Dose 23.75  mg 22.5mg 22.5mg 18.75mg 22.5mg 23.75mg 23.75mg 23.75mg 23.75mg 22.5mg 23.75mg   INR 2.1 2.1 2.7 1.8 1.9 2.1 2.4 2.5 2.2 1.9 2.3   Notes boost   missed 10/15 x 1  1x incr dose    Mis-dose      Date 1/14/19 1/28 2/11 2/22 2/28 3/7 3/20 3/27 4/10 4/24 5/1   Total Weekly Dose 23.75  mg 23.75  mg 23.75  mg 23.75  mg 23.75  mg 23.75  mg 23.75  mg 23.75  mg 23.75  mg 23.75  mg 23.75  mg   INR 2.3 2.0 2.1 2.4 2.8 2.4 2.3 2.8 2.9 1.8 1.9   Notes  1 miss     fall         Date 5/8 5/15 5/22 6/5  6/19 6/26 7/3 7/8  7/15 7/22 8/5   Total Weekly Dose 25mg 23.75  mg 23.75  mg 23.75  mg 23.75  mg 25 mg 23.75mg 25mg 25mg 25mg 25mg   INR 2.2 2.6 2.5 2.1  2.0 2.6 1.5 2.0 2.2 2.1 1.8   Notes     incr GLV at [place]  missed dose bruising        Date 8/15 8/26 9/3 9/10 9/24 10/1 10/8 10/15 10/21 10/28 11/4   Total Weekly Dose 25mg 25mg 25mg 25mg 25mg 26.25  mg 25mg 21.25  mg 22.5mg 23.75  mg 23.75  mg   INR 3.2 2.6 2.6 2.4 1.6 2.3 4.8 (POCT)  3.75 (ryan) 4.0 2.3 2.5 2.4   Notes      1x boost          Date 11/11 11/15 11/21 12/3 12/10 12/17  12/23 12/30  1/6 1/10 1/17   Total Weekly Dose 23.75  mg 23.75  mg 25mg 25mg 25mg 23.75  mg  21.25 mg 21.25  21.25mg 18.75 20 mg   INR 1.9 1.9 2.4 3.1 3.3 3.4  3.5 2.9  4.1 3.0 2.3   Notes   Boost x1 less GLV   1x hold   1x hold      Date 1/24 2/7 2/14 2/21 2/28 3/3 3/6 3/9 3/13 3/16 3/17    Total Weekly Dose 20mg 20mg 21.75 mg 20mg  21.25  mg 20 mg 18.75 mg 15mg 15mg 16.25mg 16.25mg 13.75mg   INR 2.4 1.9 3.5 1.6 2.4 3.6 4.6 3.4 3.2 4.5 2.3    Notes      doxy Bumex,  d/c lasix  doxy doxy complete 3/5 x1 hold; x1 decr x1 decr x2 decr 1x hold      Phone Interview:  Tablet Strength: 2.5mg and 7.5mg tablet  Patient Contact Info: Mikayla Chaves, Cell (daughter) [phone]  [facility] [place] in [place] Contact: Christiana (or Hawa) [phone] **preferred**    Patient Findings   Negatives:  Signs/symptoms of thrombosis, Signs/symptoms of bleeding, Laboratory test error suspected, Change in health, Change in alcohol use, Change in activity, Upcoming invasive procedure, Emergency department visit, Upcoming dental procedure, Missed doses, Extra doses, Change in medications, Change in diet/appetite, Hospital admission, Bruising, Other complaints   Comments:  Please refer to telephone encounter today regarding patient's eye. Per Hawa Ms Deutsch may have a slightly decreased appetite but no drastic changes. Verified previous dosing. Her food intake has decreased.      Plan:  1. INR is therapeutic today following dose  hold. Instructed RN, Hawa, to continue 2.5mg daily until repeat INR on Friday.   2. Repeat INR Friday. May require 2.5mg daily except 1.25mg 2-3x weekly  3. Verbal information provided over the phone. Adelaide Deutsch's RN [facility] dosing instructions, expresses understanding by teach back, and has no further questions at this time.    Rhonda Singh, PharmD  03/17/20   15:30

## 2020-03-20 NOTE — PROGRESS NOTES
Anticoagulation Clinic - Remote Progress Note  [employer] [employer]  Frequency of Monitorin-4x a a month    Indication: chronic afib  Referring Provider: Luiza (last seen 19  next visit 20)  Goal INR: 2.0-3.0  Current Drug Interactions: Omeprazole, MVI, CoQ10, Levothyroxine, celexa,   CHADS-VASc: 4 [HTN, Age>75, Female]  HAS-BLED: 2 (HTN, Elderly)  Clinic: 2018    Diet: Green beans, glenroy slaw once week, iceberg salad ~2x a week (3/20/20)  Alcohol: None  Tobacco: None  OTC Pain Medication: Tylenol    INR History:  Date 12/11 12/20 1/3/18 1/18 1/31 2/14 2/22 2/28 3/7 3/23 4/2 4/11 4/25   Total Weekly Dose 27.5 mg 27.5 mg 27.5  mg 27.5 mg   27.5mg 27.5 mg 25mg 27.5mg 27.5  mg 27.5 mg 27.5mg 27.5 mg 27.5  mg   INR 2.7 3.0 3.0 2.7 2.5 3.5 2.2 2.9 2.4 3.1 2.2 2.9 2.8   Notes       Held 1x    1 decr dose       Date 5/10 5/17 5/23 6/6 6/13 6/20 6/22 6/27 7/1 7/5   Total Weekly Dose 25mg 27.5mg 27.5mg 26.25mg 27.5mg 25mg 20mg 20mg 20mg 17.5mg   INR 1.9 2.3 2.4 3.1 3.8; 4.0 3.9 2.4 3.2 2.1 2.6   Notes missed 1x dose, self adj   Less GLV; daughter [facility]; Less GLV    More GLV; dose decr.      Date 7/11 7/18 7/25 8/1 8/7 8/15 8/22 9/5 9/10 9/17   Total Weekly Dose 17.5mg 18.75mg 18.75mg 20mg 22.5mg 20mg 20mg 20mg 22.5mg 22.5mg   INR 1.9 1.7 2.0 1.5 1.8 2.1 2.1 1.5 1.7 1.5   Notes     boost boost   boost      Date 9/24 10/1 10/15 10/22 10/29 11/5 11/19 11/26 12/10 12/24 12/31   Total Weekly Dose 23.75  mg 22.5mg 22.5mg 18.75mg 22.5mg 23.75mg 23.75mg 23.75mg 23.75mg 22.5mg 23.75mg   INR 2.1 2.1 2.7 1.8 1.9 2.1 2.4 2.5 2.2 1.9 2.3   Notes boost   missed 10/15 x 1  1x incr dose    Mis-dose      Date 1/14/19 1/28 2/11 2/22 2/28 3/7 3/20 3/27 4/10 4/24 5/1   Total Weekly Dose 23.75  mg 23.75  mg 23.75  mg 23.75  mg 23.75  mg 23.75  mg 23.75  mg 23.75  mg 23.75  mg 23.75  mg 23.75  mg   INR 2.3 2.0 2.1 2.4 2.8 2.4 2.3 2.8 2.9 1.8 1.9   Notes  1 miss     fall         Date 5/8 5/15 5/22 6/5  6/19 6/26 7/3 7/8  7/15 7/22 8/5   Total Weekly Dose 25mg 23.75  mg 23.75  mg 23.75  mg 23.75  mg 25 mg 23.75mg 25mg 25mg 25mg 25mg   INR 2.2 2.6 2.5 2.1  2.0 2.6 1.5 2.0 2.2 2.1 1.8   Notes     incr GLV at [place]  missed dose bruising        Date 8/15 8/26 9/3 9/10 9/24 10/1 10/8 10/15 10/21 10/28 11/4   Total Weekly Dose 25mg 25mg 25mg 25mg 25mg 26.25  mg 25mg 21.25  mg 22.5mg 23.75  mg 23.75  mg   INR 3.2 2.6 2.6 2.4 1.6 2.3 4.8 (POCT)  3.75 (ryan) 4.0 2.3 2.5 2.4   Notes      1x boost          Date 11/11 11/15 11/21 12/3 12/10 12/17  12/23 12/30  1/6 1/10 1/17   Total Weekly Dose 23.75  mg 23.75  mg 25mg 25mg 25mg 23.75  mg  21.25 mg 21.25  21.25mg 18.75 20 mg   INR 1.9 1.9 2.4 3.1 3.3 3.4  3.5 2.9  4.1 3.0 2.3   Notes   Boost x1 less GLV   1x hold   1x hold      Date 1/24 2/7 2/14 2/21 2/28 3/3 3/6 3/9 3/13 3/16 3/17 3/20   Total Weekly Dose 20mg 20mg 21.75 mg 20mg  21.25  mg 20 mg 18.75 mg 15mg 15mg 16.25mg 16.25mg 13.75mg   INR 2.4 1.9 3.5 1.6 2.4 3.6 4.6 3.4 3.2 4.5 2.3 2.0   Notes      doxy Bumex,  d/c lasix  doxy doxy complete 3/5 x1 hold; x1 decr x1 decr x2 decr 1x hold 1 decr     Phone Interview:  Tablet Strength: 2.5mg and 7.5mg tablet  Patient Contact Info: Mikayla Chaves, Cell (daughter) [phone]  [facility] in [place] Contact: Christiana (or Hawa) [phone] **preferred**    Patient Findings:  Negatives:  Signs/symptoms of thrombosis, Signs/symptoms of bleeding, Laboratory test error suspected, Change in health, Change in alcohol use, Change in activity, Upcoming invasive procedure, Emergency department visit, Upcoming dental procedure, Missed doses, Extra doses, Change in medications, Change in diet/appetite, Hospital admission, Bruising, Other complaints   Comments:  Ms. Get Villeda's RN, denies any changes with patient since last encounter.     Plan:  1. INR is therapeutic today following dose decrease. Instructed Christiana to have Ms. Deutsch take warfarin 2.5mg daily except 1.25mg on Monday until recheck. Patient will  likely need 1.25mg twice weekly.  2. Repeat INR on Wednesday.   3. Verbal information provided over the phone. Adelaide Deutsch's RN RBV dosing instructions, expresses understanding by teach back, and has no further questions at this time.    Eleanor Patino CPhT  03/20/20   10:52     I, Erinn Aldrich, [facility], have reviewed the note in full and agree with the assessment and plan.  03/20/20  14:03

## 2020-03-25 NOTE — PROGRESS NOTES
Anticoagulation Clinic - Remote Progress Note  [employer]  Frequency of Monitorin-4x a a month    Indication: chronic afib  Referring Provider: Luiza (last seen 19  next visit 20)  Goal INR: 2.0-3.0  Current Drug Interactions: Omeprazole, MVI, CoQ10, Levothyroxine, celexa,   CHADS-VASc: 4 [HTN, Age>75, Female]  HAS-BLED: 2 (HTN, Elderly)  Clinic: 2018    Diet: Green beans, glenroy slaw once week, iceberg salad ~2x a week (3/20/20)  Alcohol: None  Tobacco: None  OTC Pain Medication: Tylenol    INR History:  Date 12/11 12/20 1/3/18 1/18 1/31 2/14 2/22 2/28 3/7 3/23 4/2 4/11 4/25   Total Weekly Dose 27.5 mg 27.5 mg 27.5  mg 27.5 mg   27.5mg 27.5 mg 25mg 27.5mg 27.5  mg 27.5 mg 27.5mg 27.5 mg 27.5  mg   INR 2.7 3.0 3.0 2.7 2.5 3.5 2.2 2.9 2.4 3.1 2.2 2.9 2.8   Notes       Held 1x    1 decr dose       Date 5/10 5/17 5/23 6/6 6/13 6/20 6/22 6/27 7/1 7/5   Total Weekly Dose 25mg 27.5mg 27.5mg 26.25mg 27.5mg 25mg 20mg 20mg 20mg 17.5mg   INR 1.9 2.3 2.4 3.1 3.8; 4.0 3.9 2.4 3.2 2.1 2.6   Notes missed 1x dose, self adj   Less GLV; daughter [facility]; Less GLV    More GLV; dose decr.      Date 7/11 7/18 7/25 8/1 8/7 8/15 8/22 9/5 9/10 9/17   Total Weekly Dose 17.5mg 18.75mg 18.75mg 20mg 22.5mg 20mg 20mg 20mg 22.5mg 22.5mg   INR 1.9 1.7 2.0 1.5 1.8 2.1 2.1 1.5 1.7 1.5   Notes     boost boost   boost      Date 9/24 10/1 10/15 10/22 10/29 11/5 11/19 11/26 12/10 12/24 12/31   Total Weekly Dose 23.75  mg 22.5mg 22.5mg 18.75mg 22.5mg 23.75mg 23.75mg 23.75mg 23.75mg 22.5mg 23.75mg   INR 2.1 2.1 2.7 1.8 1.9 2.1 2.4 2.5 2.2 1.9 2.3   Notes boost   missed 10/15 x 1  1x incr dose    Mis-dose      Date 1/14/19 1/28 2/11 2/22 2/28 3/7 3/20 3/27 4/10 4/24 5/1   Total Weekly Dose 23.75  mg 23.75  mg 23.75  mg 23.75  mg 23.75  mg 23.75  mg 23.75  mg 23.75  mg 23.75  mg 23.75  mg 23.75  mg   INR 2.3 2.0 2.1 2.4 2.8 2.4 2.3 2.8 2.9 1.8 1.9   Notes  1 miss     fall         Date 5/8 5/15 5/22 6/5  6/19 6/26 7/3 7/8  7/15 7/22 8/5   Total Weekly Dose 25mg 23.75  mg 23.75  mg 23.75  mg 23.75  mg 25 mg 23.75mg 25mg 25mg 25mg 25mg   INR 2.2 2.6 2.5 2.1  2.0 2.6 1.5 2.0 2.2 2.1 1.8   Notes     incr GLV at [place]  missed dose bruising        Date 8/15 8/26 9/3 9/10 9/24 10/1 10/8 10/15 10/21 10/28 11/4   Total Weekly Dose 25mg 25mg 25mg 25mg 25mg 26.25  mg 25mg 21.25  mg 22.5mg 23.75  mg 23.75  mg   INR 3.2 2.6 2.6 2.4 1.6 2.3 4.8 (POCT)  3.75 (ryan) 4.0 2.3 2.5 2.4   Notes      1x boost          Date 11/11 11/15 11/21 12/3 12/10 12/17  12/23 12/30  1/6 1/10 1/17   Total Weekly Dose 23.75  mg 23.75  mg 25mg 25mg 25mg 23.75  mg  21.25 mg 21.25  21.25mg 18.75 20 mg   INR 1.9 1.9 2.4 3.1 3.3 3.4  3.5 2.9  4.1 3.0 2.3   Notes   Boost x1 less GLV   1x hold   1x hold      Date 1/24 2/7 2/14 2/21 2/28 3/3 3/6 3/9 3/13 3/16 3/17 3/20  3/25    Total Weekly Dose 20mg 20mg 21.75 mg 20mg  21.25  mg 20 mg 18.75 mg 15mg 15mg 16.25mg 16.25mg 13.75mg 16.25 mg 17.5 mg   INR 2.4 1.9 3.5 1.6 2.4 3.6 4.6 3.4 3.2 4.5 2.3 2.0  1.9    Notes      doxy Bumex,  d/c lasix  doxy doxy complete 3/5 x1 hold; x1 decr x1 decr x2 decr 1x hold 1 decr Dose inc      Phone Interview:  Tablet Strength: 2.5mg and 7.5mg tablet  Patient Contact Info: Mikayla Chaves, Cell (daughter) [phone]  [facility] in [place] Contact: Christiana (or Hawa) [phone] **preferred**    Patient Findings   Negatives:  Signs/symptoms of thrombosis, Signs/symptoms of bleeding, Laboratory test error suspected, Change in health, Change in alcohol use, Change in activity, Upcoming invasive procedure, Emergency department visit, Upcoming dental procedure, Missed doses, Extra doses, Change in medications, Change in diet/appetite, Hospital admission, Bruising, Other complaints   Comments:  Christiana stated that Ms. Deutsch has been off kilter since she completed her doxycycline, although she is getting back on track.   She has salads twice weekly.  Christiana believes that she has been eating well.      Otherwise, above findings negative     Plan:    1. INR is slightly sub therapeutic today at 1.9 despite increased dose. Instructed Christiana to have Ms. Long take warfarin 2.5mg oral daily until recheck. (17.5 mg/week, 7.3% inc from last week)  2. Repeat INR on Wednesday, 4/1.   3. Verbal information provided over the phone. Adelaide Deutsch's RN RBV dosing instructions, expresses understanding by teach back, and has no further questions at this time.    Reena Moore, PharmD  03/25/20   11:43

## 2020-04-01 NOTE — PROGRESS NOTES
Anticoagulation Clinic - Remote Progress Note  [employer]  Frequency of Monitorin-4x a a month    Indication: chronic afib  Referring Provider: Luiza (last seen 19  next visit 20)  Goal INR: 2.0-3.0  Current Drug Interactions: Omeprazole, MVI, CoQ10, Levothyroxine, celexa,   CHADS-VASc: 4 [HTN, Age>75, Female]  HAS-BLED: 2 (HTN, Elderly)  Clinic: 2018    Diet: Green beans, glenroy slaw once week, iceberg salad ~2x a week (20)  Alcohol: None  Tobacco: None  OTC Pain Medication: Tylenol    INR History:  Date 12/11 12/20 1/3/18 1/18 1/31 2/14 2/22 2/28 3/7 3/23 4/2 4/11 4/25   Total Weekly Dose 27.5 mg 27.5 mg 27.5  mg 27.5 mg   27.5mg 27.5 mg 25mg 27.5mg 27.5  mg 27.5 mg 27.5mg 27.5 mg 27.5  mg   INR 2.7 3.0 3.0 2.7 2.5 3.5 2.2 2.9 2.4 3.1 2.2 2.9 2.8   Notes       Held 1x    1 decr dose       Date 5/10 5/17 5/23 6/6 6/13 6/20 6/22 6/27 7/1 7/5   Total Weekly Dose 25mg 27.5mg 27.5mg 26.25mg 27.5mg 25mg 20mg 20mg 20mg 17.5mg   INR 1.9 2.3 2.4 3.1 3.8; 4.0 3.9 2.4 3.2 2.1 2.6   Notes missed 1x dose, self adj   Less GLV; daughter [place] [facility]; Less GLV    More GLV; dose decr.      Date 7/11 7/18 7/25 8/1 8/7 8/15 8/22 9/5 9/10 9/17   Total Weekly Dose 17.5mg 18.75mg 18.75mg 20mg 22.5mg 20mg 20mg 20mg 22.5mg 22.5mg   INR 1.9 1.7 2.0 1.5 1.8 2.1 2.1 1.5 1.7 1.5   Notes     boost boost   boost      Date 9/24 10/1 10/15 10/22 10/29 11/5 11/19 11/26 12/10 12/24 12/31   Total Weekly Dose 23.75  mg 22.5mg 22.5mg 18.75mg 22.5mg 23.75mg 23.75mg 23.75mg 23.75mg 22.5mg 23.75mg   INR 2.1 2.1 2.7 1.8 1.9 2.1 2.4 2.5 2.2 1.9 2.3   Notes boost   missed 10/15 x 1  1x incr dose    Mis-dose      Date 1/14/19 1/28 2/11 2/22 2/28 3/7 3/20 3/27 4/10 4/24 5/1   Total Weekly Dose 23.75  mg 23.75  mg 23.75  mg 23.75  mg 23.75  mg 23.75  mg 23.75  mg 23.75  mg 23.75  mg 23.75  mg 23.75  mg   INR 2.3 2.0 2.1 2.4 2.8 2.4 2.3 2.8 2.9 1.8 1.9   Notes  1 miss     fall         Date 5/8 5/15 5/22 6/5  6/19 6/26 7/3 7/8  7/15 7/22 8/5   Total Weekly Dose 25mg 23.75  mg 23.75  mg 23.75  mg 23.75  mg 25 mg 23.75mg 25mg 25mg 25mg 25mg   INR 2.2 2.6 2.5 2.1  2.0 2.6 1.5 2.0 2.2 2.1 1.8   Notes     incr GLV at [place]  missed dose bruising        Date 8/15 8/26 9/3 9/10 9/24 10/1 10/8 10/15 10/21 10/28 11/4   Total Weekly Dose 25mg 25mg 25mg 25mg 25mg 26.25  mg 25mg 21.25  mg 22.5mg 23.75  mg 23.75  mg   INR 3.2 2.6 2.6 2.4 1.6 2.3 4.8 (POCT)  3.75 (ryan) 4.0 2.3 2.5 2.4   Notes      1x boost          Date 11/11 11/15 11/21 12/3 12/10 12/17  12/23 12/30  1/6 1/10 1/17   Total Weekly Dose 23.75  mg 23.75  mg 25mg 25mg 25mg 23.75  mg  21.25 mg 21.25  21.25mg 18.75 20 mg   INR 1.9 1.9 2.4 3.1 3.3 3.4  3.5 2.9  4.1 3.0 2.3   Notes   Boost x1 less GLV   1x hold   1x hold      Date 1/24 2/7 2/14 2/21 2/28 3/3 3/6 3/9 3/13 3/16 3/17 3/20  3/25 4/1   Total Weekly Dose 20mg 20mg 21.75 mg 20mg  21.25  mg 20 mg 18.75 mg 15mg 15mg 16.25mg 16.25mg 13.75mg 16.25 mg 17.5 mg   INR 2.4 1.9 3.5 1.6 2.4 3.6 4.6 3.4 3.2 4.5 2.3 2.0  1.9 2.2   Notes      doxy Bumex,  d/c lasix  doxy doxy complete 3/5 x1 hold; x1 decr x1 decr x2 decr 1x hold 1 decr Dose inc      Phone Interview:  Tablet Strength: 2.5mg and 7.5mg tablet  Patient Contact Info: Mikayla Chaves, Cell (daughter) [phone]  [facility] in [place] Contact: Christiana (or Hawa) [phone] **preferred**    Patient Findings:  Negatives:  Signs/symptoms of thrombosis, Signs/symptoms of bleeding, Laboratory test error suspected, Change in health, Change in alcohol use, Change in activity, Upcoming invasive procedure, Emergency department visit, Upcoming dental procedure, Missed doses, Extra doses, Change in medications, Change in diet/appetite, Hospital admission, Bruising, Other complaints   Comments:  Spoke with Ms. Get Villeda's RN, who states there have been no changes since last encounter. She confirmed correct dosing for last week.     Plan:  1. INR is back WNL today following dose increase. Spoke  with Christiana Cardoso, PharmD, and instructed RN Christiana at facility to give Ms. Deutsch recently increased dose of warfarin 2.5mg daily until recheck. (17.5mg/week, 7.7% increase from previous maintenance dose of 16.25mg/week).  2. Repeat INR in one week.  3. Verbal information provided over the phone. Adelaide Deutsch's RN RBV dosing instructions, expresses understanding by teach back, and has no further questions at this time.    Eleanor Patino CPhT  04/01/20   10:48     I, Christiana Cardoso, [facility], have reviewed the note in full and agree with the assessment and plan.  04/01/20  11:54

## 2020-04-08 NOTE — PROGRESS NOTES
Anticoagulation Clinic - Remote Progress Note  [employer]  Frequency of Monitorin-4x a a month    Indication: chronic afib  Referring Provider: Luiza (last seen 19  next visit 20)  Goal INR: 2.0-3.0  Current Drug Interactions: Omeprazole, MVI, CoQ10, Levothyroxine, celexa,   CHADS-VASc: 4 [HTN, Age>75, Female]  HAS-BLED: 2 (HTN, Elderly), fall 20  Clinic: 2018    Diet: Green beans, glenroy slaw once week, iceberg salad ~2x a week (20)  Alcohol: None  Tobacco: None  OTC Pain Medication: Tylenol    INR History:  Date 12/11 12/20 1/3/18 1/18 1/31 2/14 2/22 2/28 3/7 3/23 4/2 4/11 4/25   Total Weekly Dose 27.5 mg 27.5 mg 27.5  mg 27.5 mg   27.5mg 27.5 mg 25mg 27.5mg 27.5  mg 27.5 mg 27.5mg 27.5 mg 27.5  mg   INR 2.7 3.0 3.0 2.7 2.5 3.5 2.2 2.9 2.4 3.1 2.2 2.9 2.8   Notes       Held 1x    1 decr dose       Date 5/10 5/17 5/23 6/6 6/13 6/20 6/22 6/27 7/1 7/5   Total Weekly Dose 25mg 27.5mg 27.5mg 26.25mg 27.5mg 25mg 20mg 20mg 20mg 17.5mg   INR 1.9 2.3 2.4 3.1 3.8; 4.0 3.9 2.4 3.2 2.1 2.6   Notes missed 1x dose, self adj   Less GLV; daughter [place] clinic; Less GLV    More GLV; dose decr.      Date 7/11 7/18 7/25 8/1 8/7 8/15 8/22 9/5 9/10 9/17   Total Weekly Dose 17.5mg 18.75mg 18.75mg 20mg 22.5mg 20mg 20mg 20mg 22.5mg 22.5mg   INR 1.9 1.7 2.0 1.5 1.8 2.1 2.1 1.5 1.7 1.5   Notes     boost boost   boost      Date 9/24 10/1 10/15 10/22 10/29 11/5 11/19 11/26 12/10 12/24 12/31   Total Weekly Dose 23.75  mg 22.5mg 22.5mg 18.75mg 22.5mg 23.75mg 23.75mg 23.75mg 23.75mg 22.5mg 23.75mg   INR 2.1 2.1 2.7 1.8 1.9 2.1 2.4 2.5 2.2 1.9 2.3   Notes boost   missed 10/15 x 1  1x incr dose    Mis-dose      Date 1/14/19 1/28 2/11 2/22 2/28 3/7 3/20 3/27 4/10 4/24 5/1   Total Weekly Dose 23.75  mg 23.75  mg 23.75  mg 23.75  mg 23.75  mg 23.75  mg 23.75  mg 23.75  mg 23.75  mg 23.75  mg 23.75  mg   INR 2.3 2.0 2.1 2.4 2.8 2.4 2.3 2.8 2.9 1.8 1.9   Notes  1 miss     fall         Date 5/8 5/15 5/22 6/5  6/19  6/26 7/3 7/8 7/15 7/22 8/5   Total Weekly Dose 25mg 23.75  mg 23.75  mg 23.75  mg 23.75  mg 25 mg 23.75mg 25mg 25mg 25mg 25mg   INR 2.2 2.6 2.5 2.1  2.0 2.6 1.5 2.0 2.2 2.1 1.8   Notes     incr GLV at [place]  missed dose bruising        Date 8/15 8/26 9/3 9/10 9/24 10/1 10/8 10/15 10/21 10/28 11/4   Total Weekly Dose 25mg 25mg 25mg 25mg 25mg 26.25  mg 25mg 21.25  mg 22.5mg 23.75  mg 23.75  mg   INR 3.2 2.6 2.6 2.4 1.6 2.3 4.8 (POCT)  3.75 (ryan) 4.0 2.3 2.5 2.4   Notes      1x boost          Date 11/11 11/15 11/21 12/3 12/10 12/17  12/23 12/30  1/6 1/10 1/17   Total Weekly Dose 23.75  mg 23.75  mg 25mg 25mg 25mg 23.75  mg  21.25 mg 21.25  21.25mg 18.75 20 mg   INR 1.9 1.9 2.4 3.1 3.3 3.4  3.5 2.9  4.1 3.0 2.3   Notes   Boost x1 less GLV   1x hold   1x hold      Date 1/24 2/7 2/14 2/21 2/28 3/3 3/6 3/9 3/13 3/16 3/17 3/20  3/25 4/1 4/8   Total Weekly Dose 20mg 20mg 21.75 mg 20mg  21.25  mg 20 mg 18.75 mg 15mg 15mg 16.25mg 16.25mg 13.75mg 16.25 mg 17.5 mg 17.5mg   INR 2.4 1.9 3.5 1.6 2.4 3.6 4.6 3.4 3.2 4.5 2.3 2.0  1.9 2.2 1.9   Notes      doxy Bumex,  d/c lasix  doxy doxy complete 3/5 x1 hold; x1 decr x1 decr x2 decr 1x hold 1 decr Dose inc  fall     Phone Interview:  Tablet Strength: 2.5mg and 7.5mg tablet  Patient Contact Info: Mikayla Chaves, Cell (daughter) [phone]  [facility] in [place] Contact: Christiana (or Hawa) [phone] **preferred**    Patient Findings:  Positives:  Bruising   Negatives:  Signs/symptoms of thrombosis, Signs/symptoms of bleeding, Laboratory test error suspected, Change in health, Change in alcohol use, Change in activity, Upcoming invasive procedure, Emergency department visit, Upcoming dental procedure, Missed doses, Extra doses, Change in medications, Change in diet/appetite, Hospital admission, Other complaints   Comments:  Per Christiana, Ms Deutsch had a fall over the weekend and has a large bruise on left knee, sore but not in immense pain.         Plan:  1. INR is slightly  subtherapeutic today following dose increase. Considering recent fall, hesitate to boost at this time. Instructed RN Christiana at facility to give Ms. Deutsch recently increased dose of warfarin 2.5mg daily until recheck.   2. Repeat INR next week, 4/15  3. Verbal information provided over the phone. Adelaide Deutsch's RN RBV dosing instructions, expresses understanding by teach back, and has no further questions at this time.    Erinn Aldrich, PharmD.  04/08/20   10:47

## 2020-04-15 NOTE — PROGRESS NOTES
Anticoagulation Clinic - Remote Progress Note  [employer]  Frequency of Monitorin-4x a a month    Indication: chronic afib  Referring Provider: Luiza (last seen 19  next visit 20)  Goal INR: 2.0-3.0  Current Drug Interactions: Omeprazole, MVI, CoQ10, Levothyroxine, celexa,   CHADS-VASc: 4 [HTN, Age>75, Female]  HAS-BLED: 2 (HTN, Elderly), fall 20  Clinic: 2018    Diet: Green beans, glenroy slaw once week, iceberg salad ~2x a week (4/15/20)  Alcohol: None  Tobacco: None  OTC Pain Medication: Tylenol    INR History:  Date 12/11 12/20 1/3/18 1/18 1/31 2/14 2/22 2/28 3/7 3/23 4/2 4/11 4/25   Total Weekly Dose 27.5 mg 27.5 mg 27.5  mg 27.5 mg   27.5mg 27.5 mg 25mg 27.5mg 27.5  mg 27.5 mg 27.5mg 27.5 mg 27.5  mg   INR 2.7 3.0 3.0 2.7 2.5 3.5 2.2 2.9 2.4 3.1 2.2 2.9 2.8   Notes       Held 1x    1 decr dose       Date 5/10 5/17 5/23 6/6 6/13 6/20 6/22 6/27 7/1 7/5   Total Weekly Dose 25mg 27.5mg 27.5mg 26.25mg 27.5mg 25mg 20mg 20mg 20mg 17.5mg   INR 1.9 2.3 2.4 3.1 3.8; 4.0 3.9 2.4 3.2 2.1 2.6   Notes missed 1x dose, self adj   Less GLV; daughter [place] clinic; Less GLV    More GLV; dose decr.      Date 7/11 7/18 7/25 8/1 8/7 8/15 8/22 9/5 9/10 9/17   Total Weekly Dose 17.5mg 18.75mg 18.75mg 20mg 22.5mg 20mg 20mg 20mg 22.5mg 22.5mg   INR 1.9 1.7 2.0 1.5 1.8 2.1 2.1 1.5 1.7 1.5   Notes     boost boost   boost      Date 9/24 10/1 10/15 10/22 10/29 11/5 11/19 11/26 12/10 12/24 12/31   Total Weekly Dose 23.75  mg 22.5mg 22.5mg 18.75mg 22.5mg 23.75mg 23.75mg 23.75mg 23.75mg 22.5mg 23.75mg   INR 2.1 2.1 2.7 1.8 1.9 2.1 2.4 2.5 2.2 1.9 2.3   Notes boost   missed 10/15 x 1  1x incr dose    Mis-dose      Date 1/14/19 1/28 2/11 2/22 2/28 3/7 3/20 3/27 4/10 4/24 5/1   Total Weekly Dose 23.75  mg 23.75  mg 23.75  mg 23.75  mg 23.75  mg 23.75  mg 23.75  mg 23.75  mg 23.75  mg 23.75  mg 23.75  mg   INR 2.3 2.0 2.1 2.4 2.8 2.4 2.3 2.8 2.9 1.8 1.9   Notes  1 miss     fall         Date 5/8 5/15 5/22 6/5  6/19  6/26 7/3 7/8 7/15 7/22 8/5   Total Weekly Dose 25mg 23.75  mg 23.75  mg 23.75  mg 23.75  mg 25 mg 23.75mg 25mg 25mg 25mg 25mg   INR 2.2 2.6 2.5 2.1  2.0 2.6 1.5 2.0 2.2 2.1 1.8   Notes     incr GLV at [facility]  missed dose bruising        Date 8/15 8/26 9/3 9/10 9/24 10/1 10/8 10/15 10/21 10/28 11/4   Total Weekly Dose 25mg 25mg 25mg 25mg 25mg 26.25  mg 25mg 21.25  mg 22.5mg 23.75  mg 23.75  mg   INR 3.2 2.6 2.6 2.4 1.6 2.3 4.8 (POCT)  3.75 (ryan) 4.0 2.3 2.5 2.4   Notes      1x boost          Date 11/11 11/15 11/21 12/3 12/10 12/17  12/23 12/30  1/6 1/10 1/17   Total Weekly Dose 23.75  mg 23.75  mg 25mg 25mg 25mg 23.75  mg  21.25 mg 21.25  21.25mg 18.75 20 mg   INR 1.9 1.9 2.4 3.1 3.3 3.4  3.5 2.9  4.1 3.0 2.3   Notes   Boost x1 less GLV   1x hold   1x hold      Date 1/24 2/7 2/14 2/21 2/28 3/3 3/6 3/9 3/13 3/16 3/17 3/20  3/25 4/1 4/8   Total Weekly Dose 20mg 20mg 21.75 mg 20mg  21.25  mg 20 mg 18.75 mg 15mg 15mg 16.25mg 16.25mg 13.75mg 16.25 mg 17.5 mg 17.5mg   INR 2.4 1.9 3.5 1.6 2.4 3.6 4.6 3.4 3.2 4.5 2.3 2.0  1.9 2.2 1.9   Notes      doxy Bumex,  d/c lasix  doxy doxy complete 3/5 x1 hold; x1 decr x1 decr x2 decr 1x hold 1 decr Dose inc  fall     Date 4/15         Total Weekly Dose 17.5mg         INR 2.6         Notes            Phone Interview:  Tablet Strength: 2.5mg and 7.5mg tablet  Patient Contact Info: Mikayla Chaves, Cell (daughter) [phone]  [facility] in [place] Contact: Christiana (or Hawa) [phone] **preferred**    Patient Findings:  Negatives:  Signs/symptoms of thrombosis, Signs/symptoms of bleeding, Laboratory test error suspected, Change in health, Change in alcohol use, Change in activity, Upcoming invasive procedure, Emergency department visit, Upcoming dental procedure, Missed doses, Extra doses, Change in medications, Change in diet/appetite, Hospital admission, Bruising, Other complaints   Comments:  Spoke with Hawa, patient RN, who denies any changes with Ms. Deutsch this week. Her knee  is getting better from fall reported at last encounter.     Plan:  1. INR is back WNL today. Instructed RN Hawa at facility to continue warfarin 2.5mg daily until recheck.   2. Repeat INR in one week to ensure WNL.  3. Verbal information provided over the phone. Adelaide Deutsch's RN [facility] dosing instructions, expresses understanding by teach back, and has no further questions at this time.    Eleanor Patino CP  04/15/20   14:56    I, Erinn Aldrich, [facility], have reviewed the note in full and agree with the assessment and plan.  04/15/20  15:39

## 2020-04-22 NOTE — PROGRESS NOTES
Anticoagulation Clinic - Remote Progress Note  [facility]  Frequency of Monitorin-4x a a month    Indication: chronic afib  Referring Provider: Luiza (last seen 19  next visit 20)  Goal INR: 2.0-3.0  Current Drug Interactions: Omeprazole, MVI, CoQ10, Levothyroxine, celexa,   [id number]: 4 [HTN, Age>75, Female]  HAS-BLED: 2 (HTN, Elderly), fall 20  Clinic: 2018    Diet: Green beans, glenroy slaw once week, iceberg salad ~2x a week (4/15/20)  Alcohol: None  Tobacco: None  OTC Pain Medication: Tylenol    INR History:  Date 12/11 12/20 1/3/18 1/18 1/31 2/14 2/22 2/28 3/7 3/23 4/2 4/11 4/25   Total Weekly Dose 27.5 mg 27.5 mg 27.5  mg 27.5 mg   27.5mg 27.5 mg 25mg 27.5mg 27.5  mg 27.5 mg 27.5mg 27.5 mg 27.5  mg   INR 2.7 3.0 3.0 2.7 2.5 3.5 2.2 2.9 2.4 3.1 2.2 2.9 2.8   Notes       Held 1x    1 decr dose       Date 5/10 5/17 5/23 6/6 6/13 6/20 6/22 6/27 7/1 7/5   Total Weekly Dose 25mg 27.5mg 27.5mg 26.25mg 27.5mg 25mg 20mg 20mg 20mg 17.5mg   INR 1.9 2.3 2.4 3.1 3.8; 4.0 3.9 2.4 3.2 2.1 2.6   Notes missed 1x dose, self adj   Less GLV; daughter [facility]; Less GLV    More GLV; dose decr.      Date 7/11 7/18 7/25 8/1 8/7 8/15 8/22 9/5 9/10 9/17   Total Weekly Dose 17.5mg 18.75mg 18.75mg 20mg 22.5mg 20mg 20mg 20mg 22.5mg 22.5mg   INR 1.9 1.7 2.0 1.5 1.8 2.1 2.1 1.5 1.7 1.5   Notes     boost boost   boost      Date 9/24 10/1 10/15 10/22 10/29 11/5 11/19 11/26 12/10 12/24 12/31   Total Weekly Dose 23.75  mg 22.5mg 22.5mg 18.75mg 22.5mg 23.75mg 23.75mg 23.75mg 23.75mg 22.5mg 23.75mg   INR 2.1 2.1 2.7 1.8 1.9 2.1 2.4 2.5 2.2 1.9 2.3   Notes boost   missed 10/15 x 1  1x incr dose    Mis-dose      Date 1/14/19 1/28 2/11 2/22 2/28 3/7 3/20 3/27 4/10 4/24 5/1   Total Weekly Dose 23.75  mg 23.75  mg 23.75  mg 23.75  mg 23.75  mg 23.75  mg 23.75  mg 23.75  mg 23.75  mg 23.75  mg 23.75  mg   INR 2.3 2.0 2.1 2.4 2.8 2.4 2.3 2.8 2.9 1.8 1.9   Notes  1 miss     fall         Date 5/8 5/15 5/22 6/5   6/19 6/26 7/3 7/8 7/15 7/22 8/5   Total Weekly Dose 25mg 23.75  mg 23.75  mg 23.75  mg 23.75  mg 25 mg 23.75mg 25mg 25mg 25mg 25mg   INR 2.2 2.6 2.5 2.1  2.0 2.6 1.5 2.0 2.2 2.1 1.8   Notes     incr GLV at [facility]  missed dose bruising        Date 8/15 8/26 9/3 9/10 9/24 10/1 10/8 10/15 10/21 10/28 11/4   Total Weekly Dose 25mg 25mg 25mg 25mg 25mg 26.25  mg 25mg 21.25  mg 22.5mg 23.75  mg 23.75  mg   INR 3.2 2.6 2.6 2.4 1.6 2.3 4.8 (POCT)  3.75 (ryan) 4.0 2.3 2.5 2.4   Notes      1x boost          Date 11/11 11/15 11/21 12/3 12/10 12/17  12/23 12/30  1/6 1/10 1/17   Total Weekly Dose 23.75  mg 23.75  mg 25mg 25mg 25mg 23.75  mg  21.25 mg 21.25  21.25mg 18.75 20 mg   INR 1.9 1.9 2.4 3.1 3.3 3.4  3.5 2.9  4.1 3.0 2.3   Notes   Boost x1 less GLV   1x hold   1x hold      Date 1/24 2/7 2/14 2/21 2/28 3/3 3/6 3/9 3/13 3/16 3/17 3/20  3/25 4/1 4/8   Total Weekly Dose 20mg 20mg 21.75 mg 20mg  21.25  mg 20 mg 18.75 mg 15mg 15mg 16.25mg 16.25mg 13.75mg 16.25 mg 17.5 mg 17.5mg   INR 2.4 1.9 3.5 1.6 2.4 3.6 4.6 3.4 3.2 4.5 2.3 2.0  1.9 2.2 1.9   Notes      doxy Bumex,  d/c lasix  doxy doxy complete 3/5 x1 hold; x1 decr x1 decr x2 decr 1x hold 1 decr Dose inc  fall     Date 4/15 4/22        Total Weekly Dose 17.5mg 17.5mg        INR 2.6 2.7        Notes            Phone Interview:  Tablet Strength: 2.5mg and 7.5mg tablet  Patient Contact Info: Mikayla Chaves, Cell (daughter) [phone]  [facility] in [place] Contact: Christiana (or Hawa) [phone] **preferred**    Patient Findings   Positives:  Change in medications   Negatives:  Signs/symptoms of thrombosis, Signs/symptoms of bleeding, Laboratory test error suspected, Change in health, Change in alcohol use, Change in activity, Upcoming invasive procedure, Emergency department visit, Upcoming dental procedure, Missed doses, Extra doses, Change in diet/appetite, Hospital admission, Bruising, Other complaints   Comments:  Spoke with Christiana, patient RN, who reports that she is  taking tramadol 50mg TID PRN for back pain. Christiana is giving her one this morning and then seeing if she needs another one. Otherwise, all patient findings were found to be negative upon patient interview.     Plan:   1. INR is therapeutic today. Pt is starting tramadol today. Given age, fall risk, and DDI with warfarin. Instructed RN Christiana at facility to decrease dose to 2.5mg daily except 1.25mg on Fridays until recheck.   2. Repeat INR in one week to ensure WNL.   3. Verbal information provided over the phone. Adelaide Deutsch's RN RBV dosing instructions, expresses understanding by teach back, and has no further questions at this time.    Rhonda Singh, PharmD  04/22/20   09:33

## 2020-04-27 NOTE — PROGRESS NOTES
Anticoagulation Clinic - Remote Progress Note  [facility]  Frequency of Monitorin-4x a a month    Indication: chronic afib  Referring Provider: Luiza (last seen 19  next visit 20)  Goal INR: 2.0-3.0  Current Drug Interactions: Omeprazole, MVI, CoQ10, Levothyroxine, celexa,   [id number]: 4 [HTN, Age>75, Female]  HAS-BLED: 2 (HTN, Elderly), fall 20  Clinic: 2018    Diet: Green beans, glenroy slaw once week, iceberg salad ~2x a week (4/15/20)  Alcohol: None  Tobacco: None  OTC Pain Medication: Tylenol    INR History:  Date 12/11 12/20 1/3/18 1/18 1/31 2/14 2/22 2/28 3/7 3/23 4/2 4/11 4/25   Total Weekly Dose 27.5 mg 27.5 mg 27.5  mg 27.5 mg   27.5mg 27.5 mg 25mg 27.5mg 27.5  mg 27.5 mg 27.5mg 27.5 mg 27.5  mg   INR 2.7 3.0 3.0 2.7 2.5 3.5 2.2 2.9 2.4 3.1 2.2 2.9 2.8   Notes       Held 1x    1 decr dose       Date 5/10 5/17 5/23 6/6 6/13 6/20 6/22 6/27 7/1 7/5   Total Weekly Dose 25mg 27.5mg 27.5mg 26.25mg 27.5mg 25mg 20mg 20mg 20mg 17.5mg   INR 1.9 2.3 2.4 3.1 3.8; 4.0 3.9 2.4 3.2 2.1 2.6   Notes missed 1x dose, self adj   Less GLV; daughter [facility]; Less GLV    More GLV; dose decr.      Date 7/11 7/18 7/25 8/1 8/7 8/15 8/22 9/5 9/10 9/17   Total Weekly Dose 17.5mg 18.75mg 18.75mg 20mg 22.5mg 20mg 20mg 20mg 22.5mg 22.5mg   INR 1.9 1.7 2.0 1.5 1.8 2.1 2.1 1.5 1.7 1.5   Notes     boost boost   boost      Date 9/24 10/1 10/15 10/22 10/29 11/5 11/19 11/26 12/10 12/24 12/31   Total Weekly Dose 23.75  mg 22.5mg 22.5mg 18.75mg 22.5mg 23.75mg 23.75mg 23.75mg 23.75mg 22.5mg 23.75mg   INR 2.1 2.1 2.7 1.8 1.9 2.1 2.4 2.5 2.2 1.9 2.3   Notes boost   missed 10/15 x 1  1x incr dose    Mis-dose      Date 1/14/19 1/28 2/11 2/22 2/28 3/7 3/20 3/27 4/10 4/24 5/1   Total Weekly Dose 23.75  mg 23.75  mg 23.75  mg 23.75  mg 23.75  mg 23.75  mg 23.75  mg 23.75  mg 23.75  mg 23.75  mg 23.75  mg   INR 2.3 2.0 2.1 2.4 2.8 2.4 2.3 2.8 2.9 1.8 1.9   Notes  1 miss     fall         Date 5/8 5/15 5/22 6/5   6/19 6/26 7/3 7/8 7/15 7/22 8/5   Total Weekly Dose 25mg 23.75  mg 23.75  mg 23.75  mg 23.75  mg 25 mg 23.75mg 25mg 25mg 25mg 25mg   INR 2.2 2.6 2.5 2.1  2.0 2.6 1.5 2.0 2.2 2.1 1.8   Notes     incr GLV at [facility]  missed dose bruising        Date 8/15 8/26 9/3 9/10 9/24 10/1 10/8 10/15 10/21 10/28 11/4   Total Weekly Dose 25mg 25mg 25mg 25mg 25mg 26.25  mg 25mg 21.25  mg 22.5mg 23.75  mg 23.75  mg   INR 3.2 2.6 2.6 2.4 1.6 2.3 4.8 (POCT)  3.75 (ryan) 4.0 2.3 2.5 2.4   Notes      1x boost          Date 11/11 11/15 11/21 12/3 12/10 12/17  12/23 12/30  1/6 1/10 1/17   Total Weekly Dose 23.75  mg 23.75  mg 25mg 25mg 25mg 23.75  mg  21.25 mg 21.25  21.25mg 18.75 20 mg   INR 1.9 1.9 2.4 3.1 3.3 3.4  3.5 2.9  4.1 3.0 2.3   Notes   Boost x1 less GLV   1x hold   1x hold      Date 1/24 2/7 2/14 2/21 2/28 3/3 3/6 3/9 3/13 3/16 3/17 3/20  3/25 4/1 4/8   Total Weekly Dose 20mg 20mg 21.75 mg 20mg  21.25  mg 20 mg 18.75 mg 15mg 15mg 16.25mg 16.25mg 13.75mg 16.25 mg 17.5 mg 17.5mg   INR 2.4 1.9 3.5 1.6 2.4 3.6 4.6 3.4 3.2 4.5 2.3 2.0  1.9 2.2 1.9   Notes      doxy Bumex,  d/c lasix  doxy doxy complete 3/5 x1 hold; x1 decr x1 decr x2 decr 1x hold 1 decr Dose inc  fall     Date 4/15 4/22 4/27       Total Weekly Dose 17.5mg 17.5mg 16.25mg       INR 2.6 2.7 3.7       Notes   Tramadol  1x dose red         Phone Interview:  Tablet Strength: 2.5mg and 7.5mg tablet  Patient Contact Info: Mikayla Chaves, Cell (daughter) [phone]  [facility] in [place] Contact: Christiana (or Hawa) [phone] **preferred**    Patient Findings     Negatives:  Signs/symptoms of thrombosis, Signs/symptoms of bleeding, Laboratory test error suspected, Change in health, Change in alcohol use, Change in activity, Upcoming invasive procedure, Emergency department visit, Upcoming dental procedure, Missed doses, Extra doses, Change in medications, Change in diet/appetite, Hospital admission, Bruising, Other complaints   Comments:  Per Christiana, sometimes Ms Long is  taking Tramadol 3x daily, sometimes fewer. Xray showed some compression fractures in her back         Plan:   1. INR is supratherapeutic today despite dose reduction. Given age, fall risk, and DDI with warfarin. Instructed RN Christiana at facility to decrease dose to today to 1.25mg then continue 2.5mg daily except 1.25mg on Fridays until recheck.   2. Repeat INR Friday to ensure WNL  3. Verbal information provided over the phone. Adelaide Deutsch's RN RBV dosing instructions, expresses understanding by teach back, and has no further questions at this time.      Erinn Aldrich, PharmD.  04/27/20   13:43

## 2020-05-01 NOTE — PROGRESS NOTES
Anticoagulation Clinic - Remote Progress Note  [facility]  Frequency of Monitorin-4x a a month    Indication: chronic afib  Referring Provider: Luiza (last seen 19  next visit 20)  Goal INR: 2.0-3.0  Current Drug Interactions: Omeprazole, MVI, CoQ10, Levothyroxine, celexa,   [id number]: 4 [HTN, Age>75, Female]  HAS-BLED: 2 (HTN, Elderly), fall 20  Clinic: 2018    Diet: Green beans, glenroy slaw once week, iceberg salad ~2x a week (4/15/20)  Alcohol: None  Tobacco: None  OTC Pain Medication: Tylenol    INR History:  Date 12/11 12/20 1/3/18 1/18 1/31 2/14 2/22 2/28 3/7 3/23 4/2 4/11 4/25   Total Weekly Dose 27.5 mg 27.5 mg 27.5  mg 27.5 mg   27.5mg 27.5 mg 25mg 27.5mg 27.5  mg 27.5 mg 27.5mg 27.5 mg 27.5  mg   INR 2.7 3.0 3.0 2.7 2.5 3.5 2.2 2.9 2.4 3.1 2.2 2.9 2.8   Notes       Held 1x    1 decr dose       Date 5/10 5/17 5/23 6/6 6/13 6/20 6/22 6/27 7/1 7/5   Total Weekly Dose 25mg 27.5mg 27.5mg 26.25mg 27.5mg 25mg 20mg 20mg 20mg 17.5mg   INR 1.9 2.3 2.4 3.1 3.8; 4.0 3.9 2.4 3.2 2.1 2.6   Notes missed 1x dose, self adj   Less GLV; daughter [place] [facility]; Less GLV    More GLV; dose decr.      Date 7/11 7/18 7/25 8/1 8/7 8/15 8/22 9/5 9/10 9/17   Total Weekly Dose 17.5mg 18.75mg 18.75mg 20mg 22.5mg 20mg 20mg 20mg 22.5mg 22.5mg   INR 1.9 1.7 2.0 1.5 1.8 2.1 2.1 1.5 1.7 1.5   Notes     boost boost   boost      Date 9/24 10/1 10/15 10/22 10/29 11/5 11/19 11/26 12/10 12/24 12/31   Total Weekly Dose 23.75  mg 22.5mg 22.5mg 18.75mg 22.5mg 23.75mg 23.75mg 23.75mg 23.75mg 22.5mg 23.75mg   INR 2.1 2.1 2.7 1.8 1.9 2.1 2.4 2.5 2.2 1.9 2.3   Notes boost   missed 10/15 x 1  1x incr dose    Mis-dose      Date 1/14/19 1/28 2/11 2/22 2/28 3/7 3/20 3/27 4/10 4/24 5/1   Total Weekly Dose 23.75  mg 23.75  mg 23.75  mg 23.75  mg 23.75  mg 23.75  mg 23.75  mg 23.75  mg 23.75  mg 23.75  mg 23.75  mg   INR 2.3 2.0 2.1 2.4 2.8 2.4 2.3 2.8 2.9 1.8 1.9   Notes  1 miss     fall         Date 5/8 5/15 5/22 6/5   6/19 6/26 7/3 7/8 7/15 7/22 8/5   Total Weekly Dose 25mg 23.75  mg 23.75  mg 23.75  mg 23.75  mg 25 mg 23.75mg 25mg 25mg 25mg 25mg   INR 2.2 2.6 2.5 2.1  2.0 2.6 1.5 2.0 2.2 2.1 1.8   Notes     incr GLV at [facility]  missed dose bruising        Date 8/15 8/26 9/3 9/10 9/24 10/1 10/8 10/15 10/21 10/28 11/4   Total Weekly Dose 25mg 25mg 25mg 25mg 25mg 26.25  mg 25mg 21.25  mg 22.5mg 23.75  mg 23.75  mg   INR 3.2 2.6 2.6 2.4 1.6 2.3 4.8 (POCT)  3.75 (ryan) 4.0 2.3 2.5 2.4   Notes      1x boost          Date 11/11 11/15 11/21 12/3 12/10 12/17  12/23 12/30  1/6 1/10 1/17   Total Weekly Dose 23.75  mg 23.75  mg 25mg 25mg 25mg 23.75  mg  21.25 mg 21.25  21.25mg 18.75 20 mg   INR 1.9 1.9 2.4 3.1 3.3 3.4  3.5 2.9  4.1 3.0 2.3   Notes   Boost x1 less GLV   1x hold   1x hold      Date 1/24 2/7 2/14 2/21 2/28 3/3 3/6 3/9 3/13 3/16 3/17 3/20  3/25 4/1 4/8   Total Weekly Dose 20mg 20mg 21.75 mg 20mg  21.25  mg 20 mg 18.75 mg 15mg 15mg 16.25mg 16.25mg 13.75mg 16.25 mg 17.5 mg 17.5mg   INR 2.4 1.9 3.5 1.6 2.4 3.6 4.6 3.4 3.2 4.5 2.3 2.0  1.9 2.2 1.9   Notes      doxy Bumex,  d/c lasix  doxy doxy complete 3/5 x1 hold; x1 decr x1 decr x2 decr 1x hold 1 decr Dose inc  fall     Date 4/15 4/22 4/27 5/1      Total Weekly Dose 17.5mg 17.5mg 16.25mg 15mg      INR 2.6 2.7 3.7 3.3      Notes   Tramadol  1x dose red tramadol 2x dose red        Phone Interview:  Tablet Strength: 2.5mg and 7.5mg tablet  Patient Contact Info: Mikayla Chaves, Cell (daughter) [phone]  [facility] in [place] Contact: Christiana (or Hawa) [phone] **preferred**    Patient Findings     Positives:  Change in medications, Change in diet/appetite   Negatives:  Signs/symptoms of thrombosis, Signs/symptoms of bleeding, Laboratory test error suspected, Change in health, Change in alcohol use, Change in activity, Upcoming invasive procedure, Emergency department visit, Upcoming dental procedure, Missed doses, Extra doses, Hospital admission, Bruising, Other complaints    Comments:  Per Christiana, Ms Deutsch is still taking tramadol and is in a lot of pain. She is not eating very much at all. Has started taking colace + mirlax for constipation. Takes miralax in the AM and warfarin in PM              Plan:   1. INR remains supratherapeutic today and trending downward with dose reduction. Given age, fall risk, and DDI with warfarin. Instructed MARIANO Villeda at facility to continue 2.5mg daily except 1.25mg MonFri until recheck. (15mg/week)  2. Repeat INR Tuesday, can determine if 1.25mg 3x weekly is necessary  3. Verbal information provided over the phone. Adelaide Deutsch's RN RBV dosing instructions, expresses understanding by teach back, and has no further questions at this time.      Erinn Aldrich, PharmD.  05/01/20   11:52

## 2020-05-05 NOTE — PROGRESS NOTES
Anticoagulation Clinic - Remote Progress Note  [facility]  Frequency of Monitorin-4x a a month    Indication: chronic afib  Referring Provider: Luiza (last seen 19  next visit 20)  Goal INR: 2.0-3.0  Current Drug Interactions: Omeprazole, MVI, CoQ10, Levothyroxine, celexa,   [id number]: 4 [HTN, Age>75, Female]  HAS-BLED: 2 (HTN, Elderly), fall 20  Clinic: 2018    Diet: Green beans, glenroy slaw once week, iceberg salad ~2x a week (4/15/20)  Alcohol: None  Tobacco: None  OTC Pain Medication: Tylenol    INR History:  Date 12/11 12/20 1/3/18 1/18 1/31 2/14 2/22 2/28 3/7 3/23 4/2 4/11 4/25   Total Weekly Dose 27.5 mg 27.5 mg 27.5  mg 27.5 mg   27.5mg 27.5 mg 25mg 27.5mg 27.5  mg 27.5 mg 27.5mg 27.5 mg 27.5  mg   INR 2.7 3.0 3.0 2.7 2.5 3.5 2.2 2.9 2.4 3.1 2.2 2.9 2.8   Notes       Held 1x    1 decr dose       Date 5/10 5/17 5/23 6/6 6/13 6/20 6/22 6/27 7/1 7/5   Total Weekly Dose 25mg 27.5mg 27.5mg 26.25mg 27.5mg 25mg 20mg 20mg 20mg 17.5mg   INR 1.9 2.3 2.4 3.1 3.8; 4.0 3.9 2.4 3.2 2.1 2.6   Notes missed 1x dose, self adj   Less GLV; daughter [facility]; Less GLV    More GLV; dose decr.      Date 7/11 7/18 7/25 8/1 8/7 8/15 8/22 9/5 9/10 9/17   Total Weekly Dose 17.5mg 18.75mg 18.75mg 20mg 22.5mg 20mg 20mg 20mg 22.5mg 22.5mg   INR 1.9 1.7 2.0 1.5 1.8 2.1 2.1 1.5 1.7 1.5   Notes     boost boost   boost      Date 9/24 10/1 10/15 10/22 10/29 11/5 11/19 11/26 12/10 12/24 12/31   Total Weekly Dose 23.75  mg 22.5mg 22.5mg 18.75mg 22.5mg 23.75mg 23.75mg 23.75mg 23.75mg 22.5mg 23.75mg   INR 2.1 2.1 2.7 1.8 1.9 2.1 2.4 2.5 2.2 1.9 2.3   Notes boost   missed 10/15 x 1  1x incr dose    Mis-dose      Date 1/14/19 1/28 2/11 2/22 2/28 3/7 3/20 3/27 4/10 4/24 5/1   Total Weekly Dose 23.75  mg 23.75  mg 23.75  mg 23.75  mg 23.75  mg 23.75  mg 23.75  mg 23.75  mg 23.75  mg 23.75  mg 23.75  mg   INR 2.3 2.0 2.1 2.4 2.8 2.4 2.3 2.8 2.9 1.8 1.9   Notes  1 miss     fall         Date 5/8 5/15 5/22 6/5   6/19 6/26 7/3 7/8 7/15 7/22 8/5   Total Weekly Dose 25mg 23.75  mg 23.75  mg 23.75  mg 23.75  mg 25 mg 23.75mg 25mg 25mg 25mg 25mg   INR 2.2 2.6 2.5 2.1  2.0 2.6 1.5 2.0 2.2 2.1 1.8   Notes     incr GLV at [place]  missed dose bruising        Date 8/15 8/26 9/3 9/10 9/24 10/1 10/8 10/15 10/21 10/28 11/4   Total Weekly Dose 25mg 25mg 25mg 25mg 25mg 26.25  mg 25mg 21.25  mg 22.5mg 23.75  mg 23.75  mg   INR 3.2 2.6 2.6 2.4 1.6 2.3 4.8 (POCT)  3.75 (ryan) 4.0 2.3 2.5 2.4   Notes      1x boost          Date 11/11 11/15 11/21 12/3 12/10 12/17  12/23 12/30  1/6 1/10 1/17   Total Weekly Dose 23.75  mg 23.75  mg 25mg 25mg 25mg 23.75  mg  21.25 mg 21.25  21.25mg 18.75 20 mg   INR 1.9 1.9 2.4 3.1 3.3 3.4  3.5 2.9  4.1 3.0 2.3   Notes   Boost x1 less GLV   1x hold   1x hold      Date 1/24 2/7 2/14 2/21 2/28 3/3 3/6 3/9 3/13 3/16 3/17 3/20  3/25 4/1 4/8   Total Weekly Dose 20mg 20mg 21.75 mg 20mg  21.25  mg 20 mg 18.75 mg 15mg 15mg 16.25mg 16.25mg 13.75mg 16.25 mg 17.5 mg 17.5mg   INR 2.4 1.9 3.5 1.6 2.4 3.6 4.6 3.4 3.2 4.5 2.3 2.0  1.9 2.2 1.9   Notes      doxy Bumex,  d/c lasix  doxy doxy complete 3/5 x1 hold; x1 decr x1 decr x2 decr 1x hold 1 decr Dose inc  fall     Date 4/15 4/22 4/27 5/1 5/5     Total Weekly Dose 17.5mg 17.5mg 16.25mg 15mg 15mg     INR 2.6 2.7 3.7 3.3 2.9     Notes   Tramadol  1x dose red tramadol 2x dose red  off tramadol       Phone Interview:  Tablet Strength: 2.5mg and 7.5mg tablet  Patient Contact Info: Mikaylaagapito Chaves, Cell (daughter) [phone]  [facility] in [place] Contact: Christiana (or Hawa) [phone] **preferred**    Patient Findings   Positives:  Change in medications   Negatives:  Signs/symptoms of thrombosis, Signs/symptoms of bleeding, Laboratory test error suspected, Change in health, Change in alcohol use, Change in activity, Upcoming invasive procedure, Emergency department visit, Upcoming dental procedure, Missed doses, Extra doses, Change in diet/appetite, Hospital admission, Bruising,  Other complaints   Comments:  Per Christiana, Ms. Deutsch is no longer taking tramadol due to confusion/ hallucinations. She is going is now taking APAP arthritis TID. She is eating a little bit better. Constipation has remitted.      Plan:   1. INR remains therapeutic today. Instructed MARIANO Villeda at facility to continue 2.5mg daily until recheck. Will evaluate dose for Friday night now that patient is no longer on tramadol and is now taking APAP Arthritis.   2. Given age and fall risk, will repeat INR Friday.  3. Verbal information provided over the phone. Adelaide Get's RN RBV dosing instructions, expresses understanding by teach back, and has no further questions at this time.    Rhonda Singh, PharmD  05/05/20   12:49

## 2020-05-08 NOTE — PROGRESS NOTES
Anticoagulation Clinic - Remote Progress Note  [facility]  Frequency of Monitorin-4x a a month    Indication: chronic afib  Referring Provider: Luiza (last seen 19  next visit 20)  Goal INR: 2.0-3.0  Current Drug Interactions: Omeprazole, MVI, CoQ10, Levothyroxine, celexa,   [id number]: 4 [HTN, Age>75, Female]  HAS-BLED: 2 (HTN, Elderly), fall 20  Clinic: 2018    Diet: Green beans, glenroy slaw once week, iceberg salad ~2x a week (4/15/20)  Alcohol: None  Tobacco: None  OTC Pain Medication: Tylenol    INR History:  Date 12/11 12/20 1/3/18 1/18 1/31 2/14 2/22 2/28 3/7 3/23 4/2 4/11 4/25   Total Weekly Dose 27.5 mg 27.5 mg 27.5  mg 27.5 mg   27.5mg 27.5 mg 25mg 27.5mg 27.5  mg 27.5 mg 27.5mg 27.5 mg 27.5  mg   INR 2.7 3.0 3.0 2.7 2.5 3.5 2.2 2.9 2.4 3.1 2.2 2.9 2.8   Notes       Held 1x    1 decr dose       Date 5/10 5/17 5/23 6/6 6/13 6/20 6/22 6/27 7/1 7/5   Total Weekly Dose 25mg 27.5mg 27.5mg 26.25mg 27.5mg 25mg 20mg 20mg 20mg 17.5mg   INR 1.9 2.3 2.4 3.1 3.8; 4.0 3.9 2.4 3.2 2.1 2.6   Notes missed 1x dose, self adj   Less GLV; daughter [facility]; Less GLV    More GLV; dose decr.      Date 7/11 7/18 7/25 8/1 8/7 8/15 8/22 9/5 9/10 9/17   Total Weekly Dose 17.5mg 18.75mg 18.75mg 20mg 22.5mg 20mg 20mg 20mg 22.5mg 22.5mg   INR 1.9 1.7 2.0 1.5 1.8 2.1 2.1 1.5 1.7 1.5   Notes     boost boost   boost      Date 9/24 10/1 10/15 10/22 10/29 11/5 11/19 11/26 12/10 12/24 12/31   Total Weekly Dose 23.75  mg 22.5mg 22.5mg 18.75mg 22.5mg 23.75mg 23.75mg 23.75mg 23.75mg 22.5mg 23.75mg   INR 2.1 2.1 2.7 1.8 1.9 2.1 2.4 2.5 2.2 1.9 2.3   Notes boost   missed 10/15 x 1  1x incr dose    Mis-dose      Date 1/14/19 1/28 2/11 2/22 2/28 3/7 3/20 3/27 4/10 4/24 5/1   Total Weekly Dose 23.75  mg 23.75  mg 23.75  mg 23.75  mg 23.75  mg 23.75  mg 23.75  mg 23.75  mg 23.75  mg 23.75  mg 23.75  mg   INR 2.3 2.0 2.1 2.4 2.8 2.4 2.3 2.8 2.9 1.8 1.9   Notes  1 miss     fall         Date 5/8 5/15 5/22 6/5   6/19 6/26 7/3 7/8 7/15 7/22 8/5   Total Weekly Dose 25mg 23.75  mg 23.75  mg 23.75  mg 23.75  mg 25 mg 23.75mg 25mg 25mg 25mg 25mg   INR 2.2 2.6 2.5 2.1  2.0 2.6 1.5 2.0 2.2 2.1 1.8   Notes     incr GLV at [facility]  missed dose bruising        Date 8/15 8/26 9/3 9/10 9/24 10/1 10/8 10/15 10/21 10/28 11/4   Total Weekly Dose 25mg 25mg 25mg 25mg 25mg 26.25  mg 25mg 21.25  mg 22.5mg 23.75  mg 23.75  mg   INR 3.2 2.6 2.6 2.4 1.6 2.3 4.8 (POCT)  3.75 (ryan) 4.0 2.3 2.5 2.4   Notes      1x boost          Date 11/11 11/15 11/21 12/3 12/10 12/17  12/23 12/30  1/6 1/10 1/17   Total Weekly Dose 23.75  mg 23.75  mg 25mg 25mg 25mg 23.75  mg  21.25 mg 21.25  21.25mg 18.75 20 mg   INR 1.9 1.9 2.4 3.1 3.3 3.4  3.5 2.9  4.1 3.0 2.3   Notes   Boost x1 less GLV   1x hold   1x hold      Date 1/24 2/7 2/14 2/21 2/28 3/3 3/6 3/9 3/13 3/16 3/17 3/20  3/25 4/1 4/8   Total Weekly Dose 20mg 20mg 21.75 mg 20mg  21.25  mg 20 mg 18.75 mg 15mg 15mg 16.25mg 16.25mg 13.75mg 16.25 mg 17.5 mg 17.5mg   INR 2.4 1.9 3.5 1.6 2.4 3.6 4.6 3.4 3.2 4.5 2.3 2.0  1.9 2.2 1.9   Notes      doxy Bumex,  d/c lasix  doxy doxy complete 3/5 x1 hold; x1 decr x1 decr x2 decr 1x hold 1 decr Dose inc  fall     Date 4/15 4/22 4/27 5/1 5/5 5/8 5/12   Total Weekly Dose 17.5mg 17.5mg 16.25mg 15mg 15mg 15 mg 13.75   INR 2.6 2.7 3.7 3.3 2.9 3.2    Notes   Tramadol  1x dose red tramadol 2x dose red  off tramadol 1x dec      Phone Interview:  Tablet Strength: 2.5mg and 7.5mg tablet  Patient Contact Info: Mikayla Anastacio, Cell (daughter) [phone]  [facility] in [place] Contact: Christiana (or Hawa) [phone] **preferred**    Patient Findings     Negatives:  Signs/symptoms of thrombosis, Signs/symptoms of bleeding, Laboratory test error suspected, Change in health, Change in alcohol use, Change in activity, Upcoming invasive procedure, Emergency department visit, Upcoming dental procedure, Missed doses, Extra doses, Change in medications, Change in diet/appetite, Hospital  admission, Bruising, Other complaints   Comments:  Patient remains off tramadol, only takes APAP for pain and miralax/senna as needed for bowel movements.  RN states that patient's appetite is variable, but she has eaten her meals all week this week.  She says that the residents are served approximately 2 salads per week.       Plan:   1. INR is supra-therapeutic today and has increased from check earlier this week. Instructed RN Christiana at facility to decrease tomorrow's dose to 1.25 mg, then continue 2.5mg daily except 1.25 mg on MonFri until recheck. This would be a weekly dose reduction to 13.75 mg (8.3%).  2. Given age and fall risk, will repeat INR Tuesday 5/12.  3. Verbal information provided over the phone. Adelaide Deutsch's RN [facility] dosing instructions, expresses understanding by teach back, and has no further questions at this time.    Rosalina Gomez, PharmD  Pharmacy Resident  5/8/2020  10:47

## 2020-05-12 NOTE — PROGRESS NOTES
Anticoagulation Clinic - Remote Progress Note  [facility]  Frequency of Monitorin-4x a a month    Indication: chronic afib  Referring Provider: Luiza (last seen 19  next visit 20)  Goal INR: 2.0-3.0  Current Drug Interactions: Omeprazole, MVI, CoQ10, Levothyroxine, celexa,   [id number]: 4 [HTN, Age>75, Female]  HAS-BLED: 2 (HTN, Elderly), fall 20  Clinic: 2018    Diet: Green beans, glenroy slaw once week, iceberg salad ~2x a week (4/15/20)  Alcohol: None  Tobacco: None  OTC Pain Medication: Tylenol    INR History:  Date 12/11 12/20 1/3/18 1/18 1/31 2/14 2/22 2/28 3/7 3/23 4/2 4/11 4/25   Total Weekly Dose 27.5 mg 27.5 mg 27.5  mg 27.5 mg   27.5mg 27.5 mg 25mg 27.5mg 27.5  mg 27.5 mg 27.5mg 27.5 mg 27.5  mg   INR 2.7 3.0 3.0 2.7 2.5 3.5 2.2 2.9 2.4 3.1 2.2 2.9 2.8   Notes       Held 1x    1 decr dose       Date 5/10 5/17 5/23 6/6 6/13 6/20 6/22 6/27 7/1 7/5   Total Weekly Dose 25mg 27.5mg 27.5mg 26.25mg 27.5mg 25mg 20mg 20mg 20mg 17.5mg   INR 1.9 2.3 2.4 3.1 3.8; 4.0 3.9 2.4 3.2 2.1 2.6   Notes missed 1x dose, self adj   Less GLV; daughter [facility]; Less GLV    More GLV; dose decr.      Date 7/11 7/18 7/25 8/1 8/7 8/15 8/22 9/5 9/10 9/17   Total Weekly Dose 17.5mg 18.75mg 18.75mg 20mg 22.5mg 20mg 20mg 20mg 22.5mg 22.5mg   INR 1.9 1.7 2.0 1.5 1.8 2.1 2.1 1.5 1.7 1.5   Notes     boost boost   boost      Date 9/24 10/1 10/15 10/22 10/29 11/5 11/19 11/26 12/10 12/24 12/31   Total Weekly Dose 23.75  mg 22.5mg 22.5mg 18.75mg 22.5mg 23.75mg 23.75mg 23.75mg 23.75mg 22.5mg 23.75mg   INR 2.1 2.1 2.7 1.8 1.9 2.1 2.4 2.5 2.2 1.9 2.3   Notes boost   missed 10/15 x 1  1x incr dose    Mis-dose      Date 1/14/19 1/28 2/11 2/22 2/28 3/7 3/20 3/27 4/10 4/24 5/1   Total Weekly Dose 23.75  mg 23.75  mg 23.75  mg 23.75  mg 23.75  mg 23.75  mg 23.75  mg 23.75  mg 23.75  mg 23.75  mg 23.75  mg   INR 2.3 2.0 2.1 2.4 2.8 2.4 2.3 2.8 2.9 1.8 1.9   Notes  1 miss     fall         Date 5/8 5/15 5/22 6/5   6/19 6/26 7/3 7/8 7/15 7/22 8/5   Total Weekly Dose 25mg 23.75  mg 23.75  mg 23.75  mg 23.75  mg 25 mg 23.75mg 25mg 25mg 25mg 25mg   INR 2.2 2.6 2.5 2.1  2.0 2.6 1.5 2.0 2.2 2.1 1.8   Notes     incr GLV at [facility]  missed dose bruising        Date 8/15 8/26 9/3 9/10 9/24 10/1 10/8 10/15 10/21 10/28 11/4   Total Weekly Dose 25mg 25mg 25mg 25mg 25mg 26.25  mg 25mg 21.25  mg 22.5mg 23.75  mg 23.75  mg   INR 3.2 2.6 2.6 2.4 1.6 2.3 4.8 (POCT)  3.75 (ryan) 4.0 2.3 2.5 2.4   Notes      1x boost          Date 11/11 11/15 11/21 12/3 12/10 12/17  12/23 12/30  1/6 1/10 1/17   Total Weekly Dose 23.75  mg 23.75  mg 25mg 25mg 25mg 23.75  mg  21.25 mg 21.25  21.25mg 18.75 20 mg   INR 1.9 1.9 2.4 3.1 3.3 3.4  3.5 2.9  4.1 3.0 2.3   Notes   Boost x1 less GLV   1x hold   1x hold      Date 1/24 2/7 2/14 2/21 2/28 3/3 3/6 3/9 3/13 3/16 3/17 3/20  3/25 4/1 4/8   Total Weekly Dose 20mg 20mg 21.75 mg 20mg  21.25  mg 20 mg 18.75 mg 15mg 15mg 16.25mg 16.25mg 13.75mg 16.25 mg 17.5 mg 17.5mg   INR 2.4 1.9 3.5 1.6 2.4 3.6 4.6 3.4 3.2 4.5 2.3 2.0  1.9 2.2 1.9   Notes      doxy Bumex,  d/c lasix  doxy doxy complete 3/5 x1 hold; x1 decr x1 decr x2 decr 1x hold 1 decr Dose inc  fall     Date 4/15 4/22 4/27 5/1 5/5 5/8 5/12   Total Weekly Dose 17.5mg 17.5mg 16.25mg 15mg 15mg 15 mg 13.75   INR 2.6 2.7 3.7 3.3 2.9 3.2 1.4   Notes   Tramadol  1x dose red tramadol 2x dose red  off tramadol 1x dec 1x dec     Phone Interview:  Tablet Strength: 2.5mg and 7.5mg tablet  Patient Contact Info: Mikayla Chaves, Cell (daughter) [phone]  [facility] in [place] Contact: Christiana (or Hawa) [phone] **preferred**    Patient Findings     Negatives:  Signs/symptoms of thrombosis, Signs/symptoms of bleeding, Laboratory test error suspected, Change in health, Change in alcohol use, Change in activity, Upcoming invasive procedure, Emergency department visit, Upcoming dental procedure, Missed doses, Extra doses, Change in medications, Change in diet/appetite,  Hospital admission, Bruising, Other complaints   Comments:  MARIANO Villeda denies missed doses, GLV, medication changes, liver, ensure.            Plan:   1. INR is subtherapeutic today following dose decrease . Instructed MARIANO Villeda at facility to increase tonight's dose to 3.75 mg, then continue 2.5mg daily  until recheck.   2. Will repeat INR Friday   3. Verbal information provided over the phone. Adelaide Deutsch's RN RBV dosing instructions, expresses understanding by teach back, and has no further questions at this time.    Erinn Aldrich, PharmD.  05/12/20   11:22

## 2020-05-15 NOTE — PROGRESS NOTES
Anticoagulation Clinic - Remote Progress Note  [facility]  Frequency of Monitorin-4x a a month    Indication: chronic afib  Referring Provider: Luiza (last seen 19  next visit 20)  Goal INR: 2.0-3.0  Current Drug Interactions: Omeprazole, MVI, CoQ10, Levothyroxine, celexa,   [id number]: 4 [HTN, Age>75, Female]  HAS-BLED: 2 (HTN, Elderly), fall 20  Clinic: 2018    Diet: Green beans, glenroy slaw once week, iceberg salad ~2x a week (4/15/20)  Alcohol: None  Tobacco: None  OTC Pain Medication: Tylenol    INR History:  Date 12/11 12/20 1/3/18 1/18 1/31 2/14 2/22 2/28 3/7 3/23 4/2 4/11 4/25   Total Weekly Dose 27.5 mg 27.5 mg 27.5  mg 27.5 mg   27.5mg 27.5 mg 25mg 27.5mg 27.5  mg 27.5 mg 27.5mg 27.5 mg 27.5  mg   INR 2.7 3.0 3.0 2.7 2.5 3.5 2.2 2.9 2.4 3.1 2.2 2.9 2.8   Notes       Held 1x    1 decr dose       Date 5/10 5/17 5/23 6/6 6/13 6/20 6/22 6/27 7/1 7/5   Total Weekly Dose 25mg 27.5mg 27.5mg 26.25mg 27.5mg 25mg 20mg 20mg 20mg 17.5mg   INR 1.9 2.3 2.4 3.1 3.8; 4.0 3.9 2.4 3.2 2.1 2.6   Notes missed 1x dose, self adj   Less GLV; daughter [facility]; Less GLV    More GLV; dose decr.      Date 7/11 7/18 7/25 8/1 8/7 8/15 8/22 9/5 9/10 9/17   Total Weekly Dose 17.5mg 18.75mg 18.75mg 20mg 22.5mg 20mg 20mg 20mg 22.5mg 22.5mg   INR 1.9 1.7 2.0 1.5 1.8 2.1 2.1 1.5 1.7 1.5   Notes     boost boost   boost      Date 9/24 10/1 10/15 10/22 10/29 11/5 11/19 11/26 12/10 12/24 12/31   Total Weekly Dose 23.75  mg 22.5mg 22.5mg 18.75mg 22.5mg 23.75mg 23.75mg 23.75mg 23.75mg 22.5mg 23.75mg   INR 2.1 2.1 2.7 1.8 1.9 2.1 2.4 2.5 2.2 1.9 2.3   Notes boost   missed 10/15 x 1  1x incr dose    Mis-dose      Date 1/14/19 1/28 2/11 2/22 2/28 3/7 3/20 3/27 4/10 4/24 5/1   Total Weekly Dose 23.75  mg 23.75  mg 23.75  mg 23.75  mg 23.75  mg 23.75  mg 23.75  mg 23.75  mg 23.75  mg 23.75  mg 23.75  mg   INR 2.3 2.0 2.1 2.4 2.8 2.4 2.3 2.8 2.9 1.8 1.9   Notes  1 miss     fall         Date 5/8 5/15 5/22 6/5   6/19 6/26 7/3 7/8 7/15 7/22 8/5   Total Weekly Dose 25mg 23.75  mg 23.75  mg 23.75  mg 23.75  mg 25 mg 23.75mg 25mg 25mg 25mg 25mg   INR 2.2 2.6 2.5 2.1  2.0 2.6 1.5 2.0 2.2 2.1 1.8   Notes     incr GLV at [facility]  missed dose bruising        Date 8/15 8/26 9/3 9/10 9/24 10/1 10/8 10/15 10/21 10/28 11/4   Total Weekly Dose 25mg 25mg 25mg 25mg 25mg 26.25  mg 25mg 21.25  mg 22.5mg 23.75  mg 23.75  mg   INR 3.2 2.6 2.6 2.4 1.6 2.3 4.8 (POCT)  3.75 (ryan) 4.0 2.3 2.5 2.4   Notes      1x boost          Date 11/11 11/15 11/21 12/3 12/10 12/17  12/23 12/30  1/6 1/10 1/17   Total Weekly Dose 23.75  mg 23.75  mg 25mg 25mg 25mg 23.75  mg  21.25 mg 21.25  21.25mg 18.75 20 mg   INR 1.9 1.9 2.4 3.1 3.3 3.4  3.5 2.9  4.1 3.0 2.3   Notes   Boost x1 less GLV   1x hold   1x hold      Date 1/24 2/7 2/14 2/21 2/28 3/3 3/6 3/9 3/13 3/16 3/17 3/20  3/25 4/1 4/8   Total Weekly Dose 20mg 20mg 21.75 mg 20mg  21.25  mg 20 mg 18.75 mg 15mg 15mg 16.25mg 16.25mg 13.75mg 16.25 mg 17.5 mg 17.5mg   INR 2.4 1.9 3.5 1.6 2.4 3.6 4.6 3.4 3.2 4.5 2.3 2.0  1.9 2.2 1.9   Notes      doxy Bumex,  d/c lasix  doxy doxy complete 3/5 x1 hold; x1 decr x1 decr x2 decr 1x hold 1 decr Dose inc  fall     Date 4/15 4/22 4/27 5/1 5/5 5/8 5/12 5/15     Total Weekly Dose 17.5mg 17.5mg 16.25mg 15mg 15mg 15 mg 13.75 15mg     INR 2.6 2.7 3.7 3.3 2.9 3.2 1.4 1.6     Notes   Tramadol  1x dose red tramadol 2x dose red  off tramadol 1x dec 1x dec        Phone Interview:  Tablet Strength: 2.5mg and 7.5mg tablet  Patient Contact Info: Mikayla Chaves, Cell (daughter) [phone]  [facility] in [place] Contact: Christiana (or Hawa) [phone] **preferred**    Patient Findings     Negatives:  Signs/symptoms of thrombosis, Signs/symptoms of bleeding, Laboratory test error suspected, Change in health, Change in alcohol use, Change in activity, Upcoming invasive procedure, Emergency department visit, Upcoming dental procedure, Missed doses, Extra doses, Change in medications, Change  in diet/appetite, Hospital admission, Bruising, Other complaints   Comments:  MARIANO Villeda denies missed doses, GLV, medication changes, liver, ensure.            Plan:   1. INR remains subtherapeutic today following dose increase . Instructed MARIANO Villeda at facility to take 2.5mg daily until recheck  until recheck.   2. Will repeat INR Tuesday   3. Verbal information provided over the phone. Adelaide Deutsch's RN RBV dosing instructions, expresses understanding by teach back, and has no further questions at this time.      Erinn Aldrich, PharmD.  05/15/20   12:02

## 2020-05-19 NOTE — PROGRESS NOTES
Anticoagulation Clinic - Remote Progress Note  [facility]  Frequency of Monitorin-4x a a month    Indication: chronic afib  Referring Provider: Luiza (last seen 19  next visit 20)  Goal INR: 2.0-3.0  Current Drug Interactions: Omeprazole, MVI, CoQ10, Levothyroxine, celexa,   [id number]: 4 [HTN, Age>75, Female]  HAS-BLED: 2 (HTN, Elderly), fall 20  Clinic: 2018    Diet: Green beans, glenroy slaw once week, iceberg salad ~2x a week (4/15/20)  Alcohol: None  Tobacco: None  OTC Pain Medication: Tylenol    INR History:  Date 12/11 12/20 1/3/18 1/18 1/31 2/14 2/22 2/28 3/7 3/23 4/2 4/11 4/25   Total Weekly Dose 27.5 mg 27.5 mg 27.5  mg 27.5 mg   27.5mg 27.5 mg 25mg 27.5mg 27.5  mg 27.5 mg 27.5mg 27.5 mg 27.5  mg   INR 2.7 3.0 3.0 2.7 2.5 3.5 2.2 2.9 2.4 3.1 2.2 2.9 2.8   Notes       Held 1x    1 decr dose       Date 5/10 5/17 5/23 6/6 6/13 6/20 6/22 6/27 7/1 7/5   Total Weekly Dose 25mg 27.5mg 27.5mg 26.25mg 27.5mg 25mg 20mg 20mg 20mg 17.5mg   INR 1.9 2.3 2.4 3.1 3.8; 4.0 3.9 2.4 3.2 2.1 2.6   Notes missed 1x dose, self adj   Less GLV; daughter [facility] clinic; Less GLV    More GLV; dose decr.      Date 7/11 7/18 7/25 8/1 8/7 8/15 8/22 9/5 9/10 9/17   Total Weekly Dose 17.5mg 18.75mg 18.75mg 20mg 22.5mg 20mg 20mg 20mg 22.5mg 22.5mg   INR 1.9 1.7 2.0 1.5 1.8 2.1 2.1 1.5 1.7 1.5   Notes     boost boost   boost      Date 9/24 10/1 10/15 10/22 10/29 11/5 11/19 11/26 12/10 12/24 12/31   Total Weekly Dose 23.75  mg 22.5mg 22.5mg 18.75mg 22.5mg 23.75mg 23.75mg 23.75mg 23.75mg 22.5mg 23.75mg   INR 2.1 2.1 2.7 1.8 1.9 2.1 2.4 2.5 2.2 1.9 2.3   Notes boost   missed 10/15 x 1  1x incr dose    Mis-dose      Date 1/14/19 1/28 2/11 2/22 2/28 3/7 3/20 3/27 4/10 4/24 5/1   Total Weekly Dose 23.75  mg 23.75  mg 23.75  mg 23.75  mg 23.75  mg 23.75  mg 23.75  mg 23.75  mg 23.75  mg 23.75  mg 23.75  mg   INR 2.3 2.0 2.1 2.4 2.8 2.4 2.3 2.8 2.9 1.8 1.9   Notes  1 miss     fall         Date 5/8 5/15 5/22 6/5   6/19 6/26 7/3 7/8 7/15 7/22 8/5   Total Weekly Dose 25mg 23.75  mg 23.75  mg 23.75  mg 23.75  mg 25 mg 23.75mg 25mg 25mg 25mg 25mg   INR 2.2 2.6 2.5 2.1  2.0 2.6 1.5 2.0 2.2 2.1 1.8   Notes     incr GLV at [facility]  missed dose bruising        Date 8/15 8/26 9/3 9/10 9/24 10/1 10/8 10/15 10/21 10/28 11/4   Total Weekly Dose 25mg 25mg 25mg 25mg 25mg 26.25  mg 25mg 21.25  mg 22.5mg 23.75  mg 23.75  mg   INR 3.2 2.6 2.6 2.4 1.6 2.3 4.8 (POCT)  3.75 (ryan) 4.0 2.3 2.5 2.4   Notes      1x boost          Date 11/11 11/15 11/21 12/3 12/10 12/17  12/23 12/30  1/6 1/10 1/17   Total Weekly Dose 23.75  mg 23.75  mg 25mg 25mg 25mg 23.75  mg  21.25 mg 21.25  21.25mg 18.75 20 mg   INR 1.9 1.9 2.4 3.1 3.3 3.4  3.5 2.9  4.1 3.0 2.3   Notes   Boost x1 less GLV   1x hold   1x hold      Date 1/24 2/7 2/14 2/21 2/28 3/3 3/6 3/9 3/13 3/16 3/17 3/20  3/25 4/1 4/8   Total Weekly Dose 20mg 20mg 21.75 mg 20mg  21.25  mg 20 mg 18.75 mg 15mg 15mg 16.25mg 16.25mg 13.75mg 16.25 mg 17.5 mg 17.5mg   INR 2.4 1.9 3.5 1.6 2.4 3.6 4.6 3.4 3.2 4.5 2.3 2.0  1.9 2.2 1.9   Notes      doxy Bumex,  d/c lasix  doxy doxy complete 3/5 x1 hold; x1 decr x1 decr x2 decr 1x hold 1 decr Dose inc  fall     Date 4/15 4/22 4/27 5/1 5/5 5/8 5/12 5/15 5/19    Total Weekly Dose 17.5mg 17.5mg 16.25mg 15mg 15mg 15 mg 13.75 15mg 18.75mg    INR 2.6 2.7 3.7 3.3 2.9 3.2 1.4 1.6 2.7    Notes   Tramadol  1x dose red tramadol 2x dose red  off tramadol 1x dec 1x dec        Phone Interview:  Tablet Strength: 2.5mg and 7.5mg tablet  Patient Contact Info: Mikayla Chaves, Cell (daughter) [phone]  [facility] in [place] Contact: Christiana (or Hawa) [phone] **preferred**    Patient Findings   Negatives:  Signs/symptoms of thrombosis, Signs/symptoms of bleeding, Laboratory test error suspected, Change in health, Change in alcohol use, Change in activity, Upcoming invasive procedure, Emergency department visit, Upcoming dental procedure, Missed doses, Extra doses, Change in  medications, Change in diet/appetite, Hospital admission, Bruising, Other complaints   Comments:  Negative per MARIANO Plasencia. Large INR increase over the weekend         Plan:   1. INR is therapeutic and back WNL. Instructed MARIANO Plasencia at facility to take warfarin 2.5mg daily except 1.25mg TuesFri until recheck  until recheck.   2. Will repeat INR Tuesday 5/26, can determine if twice weekly 1.25mg necessary  3. Verbal information provided over the phone. Adelaide Deutsch's RN RBV dosing instructions, expresses understanding by teach back, and has no further questions at this time.    Erinn Aldrich, PharmD.  05/19/20   10:41

## 2020-05-26 NOTE — PROGRESS NOTES
Anticoagulation Clinic - Remote Progress Note  [facility]  Frequency of Monitorin-4x a a month    Indication: chronic afib  Referring Provider: Luiza (last seen 19  next visit 20)  Goal INR: 2.0-3.0  Current Drug Interactions: Omeprazole, MVI, CoQ10, Levothyroxine, celexa  [id number]: 4 [HTN, Age>75, Female]  HAS-BLED: 2 (HTN, Elderly), fall 20  Clinic: 2018    Diet: Green beans, glenroy slaw once week, iceberg salad ~2x a week (4/15/20)  Alcohol: None  Tobacco: None  OTC Pain Medication: Tylenol    INR History:  Date 12/11 12/20 1/3/18 1/18 1/31 2/14 2/22 2/28 3/7 3/23 4/2 4/11 4/25   Total Weekly Dose 27.5 mg 27.5 mg 27.5  mg 27.5 mg   27.5mg 27.5 mg 25mg 27.5mg 27.5  mg 27.5 mg 27.5mg 27.5 mg 27.5  mg   INR 2.7 3.0 3.0 2.7 2.5 3.5 2.2 2.9 2.4 3.1 2.2 2.9 2.8   Notes       Held 1x    1 decr dose       Date 5/10 5/17 5/23 6/6 6/13 6/20 6/22 6/27 7/1 7/5   Total Weekly Dose 25mg 27.5mg 27.5mg 26.25mg 27.5mg 25mg 20mg 20mg 20mg 17.5mg   INR 1.9 2.3 2.4 3.1 3.8; 4.0 3.9 2.4 3.2 2.1 2.6   Notes missed 1x dose, self adj   Less GLV; daughter [facility] clinic; Less GLV    More GLV; dose decr.      Date 7/11 7/18 7/25 8/1 8/7 8/15 8/22 9/5 9/10 9/17   Total Weekly Dose 17.5mg 18.75mg 18.75mg 20mg 22.5mg 20mg 20mg 20mg 22.5mg 22.5mg   INR 1.9 1.7 2.0 1.5 1.8 2.1 2.1 1.5 1.7 1.5   Notes     boost boost   boost      Date 9/24 10/1 10/15 10/22 10/29 11/5 11/19 11/26 12/10 12/24 12/31   Total Weekly Dose 23.75  mg 22.5mg 22.5mg 18.75mg 22.5mg 23.75mg 23.75mg 23.75mg 23.75mg 22.5mg 23.75mg   INR 2.1 2.1 2.7 1.8 1.9 2.1 2.4 2.5 2.2 1.9 2.3   Notes boost   missed 10/15 x 1  1x incr dose    Mis-dose      Date 1/14/19 1/28 2/11 2/22 2/28 3/7 3/20 3/27 4/10 4/24 5/1   Total Weekly Dose 23.75  mg 23.75  mg 23.75  mg 23.75  mg 23.75  mg 23.75  mg 23.75  mg 23.75  mg 23.75  mg 23.75  mg 23.75  mg   INR 2.3 2.0 2.1 2.4 2.8 2.4 2.3 2.8 2.9 1.8 1.9   Notes  1 miss     fall         Date 5/8 5/15 5/22 6/5  6/19  6/26 7/3 7/8 7/15 7/22 8/5   Total Weekly Dose 25mg 23.75  mg 23.75  mg 23.75  mg 23.75  mg 25 mg 23.75mg 25mg 25mg 25mg 25mg   INR 2.2 2.6 2.5 2.1  2.0 2.6 1.5 2.0 2.2 2.1 1.8   Notes     incr GLV at [facility]  missed dose bruising        Date 8/15 8/26 9/3 9/10 9/24 10/1 10/8 10/15 10/21 10/28 11/4   Total Weekly Dose 25mg 25mg 25mg 25mg 25mg 26.25  mg 25mg 21.25  mg 22.5mg 23.75  mg 23.75  mg   INR 3.2 2.6 2.6 2.4 1.6 2.3 4.8 (POCT)  3.75 (ryan) 4.0 2.3 2.5 2.4   Notes      1x boost          Date 11/11 11/15 11/21 12/3 12/10 12/17  12/23 12/30  1/6 1/10 1/17   Total Weekly Dose 23.75  mg 23.75  mg 25mg 25mg 25mg 23.75  mg  21.25 mg 21.25  21.25mg 18.75 20 mg   INR 1.9 1.9 2.4 3.1 3.3 3.4  3.5 2.9  4.1 3.0 2.3   Notes   Boost x1 less GLV   1x hold   1x hold      Date 1/24 2/7 2/14 2/21 2/28 3/3 3/6 3/9 3/13 3/16 3/17 3/20  3/25 4/1 4/8   Total Weekly Dose 20mg 20mg 21.75 mg 20mg  21.25  mg 20 mg 18.75 mg 15mg 15mg 16.25mg 16.25mg 13.75mg 16.25 mg 17.5 mg 17.5mg   INR 2.4 1.9 3.5 1.6 2.4 3.6 4.6 3.4 3.2 4.5 2.3 2.0  1.9 2.2 1.9   Notes      doxy Bumex,  d/c lasix  doxy doxy complete 3/5 x1 hold; x1 decr x1 decr x2 decr 1x hold 1 decr Dose inc  fall     Date 4/15 4/22 4/27 5/1 5/5 5/8 5/12 5/15 5/19 5/26   Total Weekly Dose 17.5mg 17.5mg 16.25mg 15mg 15mg 15 mg 13.75 15mg 18.75mg 15mg   INR 2.6 2.7 3.7 3.3 2.9 3.2 1.4 1.6 2.7 1.9   Notes   Tramadol  1x dose red tramadol 2x dose red  off tramadol 1x dec 1x dec        Phone Interview:  Tablet Strength: 2.5mg and 7.5mg tablet  Patient Contact Info: Mikayla Chaves, Cell (daughter) [phone]  [facility] in [place] Contact: Christiana (or Hawa) [phone] **preferred**    Patient Findings   Negatives:  Signs/symptoms of thrombosis, Signs/symptoms of bleeding, Laboratory test error suspected, Change in health, Change in alcohol use, Change in activity, Upcoming invasive procedure, Emergency department visit, Upcoming dental procedure, Missed doses, Extra doses, Change in  medications, Change in diet/appetite, Hospital admission, Bruising, Other complaints     Plan:   1. INR is slightly SUBtherapeutic today. They confirmed dosing and no changes. At this time, instructed RN Christiana, to have Ms. Long increase dose to 2.5mg daily except 1.25mg Friday.  2. Will repeat INR Monday 6/1.   3. Verbal information provided over the phone. Adelaide Deutsch's RN RBV dosing instructions, expresses understanding by teach back, and has no further questions at this time.    Rhonda Singh, PharmD  05/26/20   10:48

## 2020-06-01 NOTE — PROGRESS NOTES
Anticoagulation Clinic - Remote Progress Note  [facility]  Frequency of Monitorin-4x a a month    Indication: chronic afib  Referring Provider: Luiza (last seen 19  next visit 20)  Goal INR: 2.0-3.0  Current Drug Interactions: Omeprazole, MVI, CoQ10, Levothyroxine, celexa  [id number]: 4 [HTN, Age>75, Female]  HAS-BLED: 2 (HTN, Elderly), fall 20  Clinic: 2018    Diet: Green beans, glenroy slaw once week, iceberg salad ~2x a week (4/15/20)  Alcohol: None  Tobacco: None  OTC Pain Medication: Tylenol    INR History:  Date 12/11 12/20 1/3/18 1/18 1/31 2/14 2/22 2/28 3/7 3/23 4/2 4/11 4/25   Total Weekly Dose 27.5 mg 27.5 mg 27.5  mg 27.5 mg   27.5mg 27.5 mg 25mg 27.5mg 27.5  mg 27.5 mg 27.5mg 27.5 mg 27.5  mg   INR 2.7 3.0 3.0 2.7 2.5 3.5 2.2 2.9 2.4 3.1 2.2 2.9 2.8   Notes       Held 1x    1 decr dose       Date 5/10 5/17 5/23 6/6 6/13 6/20 6/22 6/27 7/1 7/5   Total Weekly Dose 25mg 27.5mg 27.5mg 26.25mg 27.5mg 25mg 20mg 20mg 20mg 17.5mg   INR 1.9 2.3 2.4 3.1 3.8; 4.0 3.9 2.4 3.2 2.1 2.6   Notes missed 1x dose, self adj   Less GLV; daughter [place] clinic; Less GLV    More GLV; dose decr.      Date 7/11 7/18 7/25 8/1 8/7 8/15 8/22 9/5 9/10 9/17   Total Weekly Dose 17.5mg 18.75mg 18.75mg 20mg 22.5mg 20mg 20mg 20mg 22.5mg 22.5mg   INR 1.9 1.7 2.0 1.5 1.8 2.1 2.1 1.5 1.7 1.5   Notes     boost boost   boost      Date 9/24 10/1 10/15 10/22 10/29 11/5 11/19 11/26 12/10 12/24 12/31   Total Weekly Dose 23.75  mg 22.5mg 22.5mg 18.75mg 22.5mg 23.75mg 23.75mg 23.75mg 23.75mg 22.5mg 23.75mg   INR 2.1 2.1 2.7 1.8 1.9 2.1 2.4 2.5 2.2 1.9 2.3   Notes boost   missed 10/15 x 1  1x incr dose    Mis-dose      Date 1/14/19 1/28 2/11 2/22 2/28 3/7 3/20 3/27 4/10 4/24 5/1   Total Weekly Dose 23.75  mg 23.75  mg 23.75  mg 23.75  mg 23.75  mg 23.75  mg 23.75  mg 23.75  mg 23.75  mg 23.75  mg 23.75  mg   INR 2.3 2.0 2.1 2.4 2.8 2.4 2.3 2.8 2.9 1.8 1.9   Notes  1 miss     fall         Date 5/8 5/15 5/22 6/5  6/19  6/26 7/3 7/8 7/15 7/22 8/5   Total Weekly Dose 25mg 23.75  mg 23.75  mg 23.75  mg 23.75  mg 25 mg 23.75mg 25mg 25mg 25mg 25mg   INR 2.2 2.6 2.5 2.1  2.0 2.6 1.5 2.0 2.2 2.1 1.8   Notes     incr GLV at [place]  missed dose bruising        Date 8/15 8/26 9/3 9/10 9/24 10/1 10/8 10/15 10/21 10/28 11/4   Total Weekly Dose 25mg 25mg 25mg 25mg 25mg 26.25  mg 25mg 21.25  mg 22.5mg 23.75  mg 23.75  mg   INR 3.2 2.6 2.6 2.4 1.6 2.3 4.8 (POCT)  3.75 (ryan) 4.0 2.3 2.5 2.4   Notes      1x boost          Date 11/11 11/15 11/21 12/3 12/10 12/17  12/23 12/30  1/6 1/10 1/17   Total Weekly Dose 23.75  mg 23.75  mg 25mg 25mg 25mg 23.75  mg  21.25 mg 21.25  21.25mg 18.75 20 mg   INR 1.9 1.9 2.4 3.1 3.3 3.4  3.5 2.9  4.1 3.0 2.3   Notes   Boost x1 less GLV   1x hold   1x hold      Date 1/24 2/7 2/14 2/21 2/28 3/3 3/6 3/9 3/13 3/16 3/17 3/20  3/25 4/1 4/8   Total Weekly Dose 20mg 20mg 21.75 mg 20mg  21.25  mg 20 mg 18.75 mg 15mg 15mg 16.25mg 16.25mg 13.75mg 16.25 mg 17.5 mg 17.5mg   INR 2.4 1.9 3.5 1.6 2.4 3.6 4.6 3.4 3.2 4.5 2.3 2.0  1.9 2.2 1.9   Notes      doxy Bumex,  d/c lasix  doxy doxy complete 3/5 x1 hold; x1 decr x1 decr x2 decr 1x hold 1 decr Dose inc  fall     Date 4/15 4/22 4/27 5/1 5/5 5/8 5/12 5/15 5/19 5/26 6/1     Total Weekly Dose 17.5mg 17.5mg 16.25mg 15mg 15mg 15 mg 13.75 15mg 18.75mg 15mg 15mg     INR 2.6 2.7 3.7 3.3 2.9 3.2 1.4 1.6 2.7 1.9 1.9     Notes   Tramadol  1x dose red tramadol 2x dose red  off tramadol 1x dec 1x dec           Phone Interview:  Tablet Strength: 2.5mg and 7.5mg tablet  Patient Contact Info: Mikayla Chaves, Cell (daughter) [phone]  [facility] [place] in [place] Contact: Christiana (or Hawa) [phone] **preferred**    Patient Findings     Negatives:  Signs/symptoms of thrombosis, Signs/symptoms of bleeding, Laboratory test error suspected, Change in health, Change in alcohol use, Change in activity, Upcoming invasive procedure, Emergency department visit, Upcoming dental procedure, Missed doses,  Extra doses, Change in medications, Change in diet/appetite, Hospital admission, Bruising, Other complaints         Plan:   1. INR is slightly SUBtherapeutic today for the second consecutive week. They confirmed dosing and no changes. At this time, instructed MARIANO Villeda, to have Ms. Deutsch increase dose to 2.5mg daily.  2. Will repeat INR Monday 6/8.   3. Verbal information provided over the phone. Adelaide Deutsch's RN [facility] dosing instructions, expresses understanding by teach back, and has no further questions at this time.    Erinn Aldrich, PharmD.  06/01/20   11:55

## 2020-06-08 NOTE — PROGRESS NOTES
Anticoagulation Clinic - Remote Progress Note  [facility]  Frequency of Monitorin-4x a a month    Indication: chronic afib  Referring Provider: Luiza (last seen 19  next visit 20)  Goal INR: 2.0-3.0  Current Drug Interactions: Omeprazole, MVI, CoQ10, Levothyroxine, celexa  [id number]: 4 [HTN, Age>75, Female]  HAS-BLED: 2 (HTN, Elderly), fall 20  Clinic: 2018    Diet: Green beans, glenroy slaw once week, iceberg salad ~2x a week (4/15/20)  Alcohol: None  Tobacco: None  OTC Pain Medication: Tylenol    INR History:  Date 12/11 12/20 1/3/18 1/18 1/31 2/14 2/22 2/28 3/7 3/23 4/2 4/11 4/25   Total Weekly Dose 27.5 mg 27.5 mg 27.5  mg 27.5 mg   27.5mg 27.5 mg 25mg 27.5mg 27.5  mg 27.5 mg 27.5mg 27.5 mg 27.5  mg   INR 2.7 3.0 3.0 2.7 2.5 3.5 2.2 2.9 2.4 3.1 2.2 2.9 2.8   Notes       Held 1x    1 decr dose       Date 5/10 5/17 5/23 6/6 6/13 6/20 6/22 6/27 7/1 7/5   Total Weekly Dose 25mg 27.5mg 27.5mg 26.25mg 27.5mg 25mg 20mg 20mg 20mg 17.5mg   INR 1.9 2.3 2.4 3.1 3.8; 4.0 3.9 2.4 3.2 2.1 2.6   Notes missed 1x dose, self adj   Less GLV; daughter [place] [facility]; Less GLV    More GLV; dose decr.      Date 7/11 7/18 7/25 8/1 8/7 8/15 8/22 9/5 9/10 9/17   Total Weekly Dose 17.5mg 18.75mg 18.75mg 20mg 22.5mg 20mg 20mg 20mg 22.5mg 22.5mg   INR 1.9 1.7 2.0 1.5 1.8 2.1 2.1 1.5 1.7 1.5   Notes     boost boost   boost      Date 9/24 10/1 10/15 10/22 10/29 11/5 11/19 11/26 12/10 12/24 12/31   Total Weekly Dose 23.75  mg 22.5mg 22.5mg 18.75mg 22.5mg 23.75mg 23.75mg 23.75mg 23.75mg 22.5mg 23.75mg   INR 2.1 2.1 2.7 1.8 1.9 2.1 2.4 2.5 2.2 1.9 2.3   Notes boost   missed 10/15 x 1  1x incr dose    Mis-dose      Date 1/14/19 1/28 2/11 2/22 2/28 3/7 3/20 3/27 4/10 4/24 5/1   Total Weekly Dose 23.75  mg 23.75  mg 23.75  mg 23.75  mg 23.75  mg 23.75  mg 23.75  mg 23.75  mg 23.75  mg 23.75  mg 23.75  mg   INR 2.3 2.0 2.1 2.4 2.8 2.4 2.3 2.8 2.9 1.8 1.9   Notes  1 miss     fall         Date 5/8 5/15 5/22 6/5  6/19  6/26 7/3 7/8 7/15 7/22 8/5   Total Weekly Dose 25mg 23.75  mg 23.75  mg 23.75  mg 23.75  mg 25 mg 23.75mg 25mg 25mg 25mg 25mg   INR 2.2 2.6 2.5 2.1  2.0 2.6 1.5 2.0 2.2 2.1 1.8   Notes     incr GLV at [facility]  missed dose bruising        Date 8/15 8/26 9/3 9/10 9/24 10/1 10/8 10/15 10/21 10/28 11/4   Total Weekly Dose 25mg 25mg 25mg 25mg 25mg 26.25  mg 25mg 21.25  mg 22.5mg 23.75  mg 23.75  mg   INR 3.2 2.6 2.6 2.4 1.6 2.3 4.8 (POCT)  3.75 (ryan) 4.0 2.3 2.5 2.4   Notes      1x boost          Date 11/11 11/15 11/21 12/3 12/10 12/17  12/23 12/30  1/6 1/10 1/17   Total Weekly Dose 23.75  mg 23.75  mg 25mg 25mg 25mg 23.75  mg  21.25 mg 21.25  21.25mg 18.75 20 mg   INR 1.9 1.9 2.4 3.1 3.3 3.4  3.5 2.9  4.1 3.0 2.3   Notes   Boost x1 less GLV   1x hold   1x hold      Date 1/24 2/7 2/14 2/21 2/28 3/3 3/6 3/9 3/13 3/16 3/17 3/20  3/25 4/1 4/8   Total Weekly Dose 20mg 20mg 21.75 mg 20mg  21.25  mg 20 mg 18.75 mg 15mg 15mg 16.25mg 16.25mg 13.75mg 16.25 mg 17.5 mg 17.5mg   INR 2.4 1.9 3.5 1.6 2.4 3.6 4.6 3.4 3.2 4.5 2.3 2.0  1.9 2.2 1.9   Notes      doxy Bumex,  d/c lasix  doxy doxy complete 3/5 x1 hold; x1 decr x1 decr x2 decr 1x hold 1 decr Dose inc  fall     Date 4/15 4/22 4/27 5/1 5/5 5/8 5/12 5/15 5/19 5/26 6/1 6/8    Total Weekly Dose 17.5mg 17.5mg 16.25mg 15mg 15mg 15 mg 13.75 15mg 18.75mg 15mg 15mg 17.5mg    INR 2.6 2.7 3.7 3.3 2.9 3.2 1.4 1.6 2.7 1.9 1.9 2.3    Notes   Tramadol  1x dose red tramadol 2x dose red  off tramadol 1x dec 1x dec           Phone Interview:  Tablet Strength: 2.5mg and 7.5mg tablet  Patient Contact Info: Mikayla Chaves, Cell (daughter) [phone]  [facility] [place] Contact: Christiana (or Hawa) [phone] **preferred**    Patient Findings:  Negatives:  Signs/symptoms of thrombosis, Signs/symptoms of bleeding, Laboratory test error suspected, Change in health, Change in alcohol use, Change in activity, Upcoming invasive procedure, Emergency department visit, Upcoming dental procedure,  Missed doses, Extra doses, Change in medications, Change in diet/appetite, Hospital admission, Bruising, Other complaints   Comments:  Raymundo Villeda reports no changes since last visit. Mrs Deutsch has been taking warfarin as directed.        Plan:   1. INR is therapeutic today. Iinstructed MARIANO Villeda, to have Ms. Deutsch continue warfarin 2.5mg daily.  2. Will repeat INR Monday 6/15.  3. Verbal information provided over the phone. Adelaide Deutsch's RN RBV dosing instructions, expresses understanding by teach back, and has no further questions at this time.    Parker Mosqueda PharmD  Pharmacy Resident   6/8/2020  10:58

## 2020-06-16 NOTE — PROGRESS NOTES
Anticoagulation Clinic - Remote Progress Note  [facility]  Frequency of Monitorin-4x a a month    Indication: chronic afib  Referring Provider: Luiza (last seen 19  next visit 20)  Goal INR: 2.0-3.0  Current Drug Interactions: Omeprazole, MVI, CoQ10, Levothyroxine, celexa  [id number]: 4 [HTN, Age>75, Female]  HAS-BLED: 2 (HTN, Elderly), fall 20  Clinic: 2018    Diet: Green beans, glenroy slaw once week, iceberg salad ~2x a week (4/15/20)  Alcohol: None  Tobacco: None  OTC Pain Medication: Tylenol    INR History:  Date 12/11 12/20 1/3/18 1/18 1/31 2/14 2/22 2/28 3/7 3/23 4/2 4/11 4/25   Total Weekly Dose 27.5 mg 27.5 mg 27.5  mg 27.5 mg   27.5mg 27.5 mg 25mg 27.5mg 27.5  mg 27.5 mg 27.5mg 27.5 mg 27.5  mg   INR 2.7 3.0 3.0 2.7 2.5 3.5 2.2 2.9 2.4 3.1 2.2 2.9 2.8   Notes       Held 1x    1 decr dose       Date 5/10 5/17 5/23 6/6 6/13 6/20 6/22 6/27 7/1 7/5   Total Weekly Dose 25mg 27.5mg 27.5mg 26.25mg 27.5mg 25mg 20mg 20mg 20mg 17.5mg   INR 1.9 2.3 2.4 3.1 3.8; 4.0 3.9 2.4 3.2 2.1 2.6   Notes missed 1x dose, self adj   Less GLV; daughter [place] [facility]; Less GLV    More GLV; dose decr.      Date 7/11 7/18 7/25 8/1 8/7 8/15 8/22 9/5 9/10 9/17   Total Weekly Dose 17.5mg 18.75mg 18.75mg 20mg 22.5mg 20mg 20mg 20mg 22.5mg 22.5mg   INR 1.9 1.7 2.0 1.5 1.8 2.1 2.1 1.5 1.7 1.5   Notes     boost boost   boost      Date 9/24 10/1 10/15 10/22 10/29 11/5 11/19 11/26 12/10 12/24 12/31   Total Weekly Dose 23.75  mg 22.5mg 22.5mg 18.75mg 22.5mg 23.75mg 23.75mg 23.75mg 23.75mg 22.5mg 23.75mg   INR 2.1 2.1 2.7 1.8 1.9 2.1 2.4 2.5 2.2 1.9 2.3   Notes boost   missed 10/15 x 1  1x incr dose    Mis-dose      Date 1/14/19 1/28 2/11 2/22 2/28 3/7 3/20 3/27 4/10 4/24 5/1   Total Weekly Dose 23.75  mg 23.75  mg 23.75  mg 23.75  mg 23.75  mg 23.75  mg 23.75  mg 23.75  mg 23.75  mg 23.75  mg 23.75  mg   INR 2.3 2.0 2.1 2.4 2.8 2.4 2.3 2.8 2.9 1.8 1.9   Notes  1 miss     fall         Date 5/8 5/15 5/22 6/5  6/19  6/26 7/3 7/8 7/15 7/22 8/5   Total Weekly Dose 25mg 23.75  mg 23.75  mg 23.75  mg 23.75  mg 25 mg 23.75mg 25mg 25mg 25mg 25mg   INR 2.2 2.6 2.5 2.1  2.0 2.6 1.5 2.0 2.2 2.1 1.8   Notes     incr GLV at [facility]  missed dose bruising        Date 8/15 8/26 9/3 9/10 9/24 10/1 10/8 10/15 10/21 10/28 11/4   Total Weekly Dose 25mg 25mg 25mg 25mg 25mg 26.25  mg 25mg 21.25  mg 22.5mg 23.75  mg 23.75  mg   INR 3.2 2.6 2.6 2.4 1.6 2.3 4.8 (POCT)  3.75 (ryan) 4.0 2.3 2.5 2.4   Notes      1x boost          Date 11/11 11/15 11/21 12/3 12/10 12/17  12/23 12/30 1/6/20 1/10 1/17   Total Weekly Dose 23.75  mg 23.75  mg 25mg 25mg 25mg 23.75  mg  21.25 mg 21.25 21.25mg 18.75 20 mg   INR 1.9 1.9 2.4 3.1 3.3 3.4  3.5 2.9 4.1 3.0 2.3   Notes   Boost x1 less GLV   1x hold   1x hold      Date 1/24 2/7 2/14 2/21 2/28 3/3 3/6 3/9 3/13 3/16 3/17 3/20  3/25 4/1 4/8   Total Weekly Dose 20mg 20mg 21.75 mg 20mg  21.25  mg 20mg 18.75  mg 15mg 15mg 16.25  mg 16.25  mg 13.75  mg 16.25  mg 17.5mg 17.5mg   INR 2.4 1.9 3.5 1.6 2.4 3.6 4.6 3.4 3.2 4.5 2.3 2.0  1.9 2.2 1.9   Notes      doxy Bumex,  d/c lasix  doxy doxy complete 3/5 x1 hold; x1 decr x1 decr x2 decr 1x hold 1 decr Dose inc  fall     Date 4/15 4/22 4/27 5/1 5/5 5/8 5/12 5/15 5/19 5/26 6/1 6/8 6/16     Total Weekly Dose 17.5mg 17.5mg 16.25mg 15mg 15mg 15mg 13.75 15mg 18.75mg 15mg 15mg 17.5mg 17.5mg     INR 2.6 2.7 3.7 3.3 2.9 3.2 1.4 1.6 2.7 1.9 1.9 2.3 2.1     Notes   Tramadol  1x dose red tramadol 2x dose red off tramadol 1x dec 1x dec             Phone Interview:  Tablet Strength: 2.5mg and 7.5mg tablet  Patient Contact Info: Mikayla Chaves, Cell (daughter) [phone]  [facility] in [place] Contact: Christiana (or Hawa) [phone] **preferred**    Patient Findings   Negatives:  Signs/symptoms of thrombosis, Signs/symptoms of bleeding, Laboratory test error suspected, Change in health, Change in alcohol use, Change in activity, Upcoming invasive procedure, Emergency department visit,  "Upcoming dental procedure, Missed doses, Extra doses, Change in medications, Change in diet/appetite, Hospital admission, Bruising, Other complaints   Comments:  MARIANO Villeda denies all findings for patient. Patient continues to \"eat like a bird\"     Plan:   1. INR is therapeutic today at 2.1. Iinstructed MARIANO Villeda, to have Ms. Deutsch continue warfarin 2.5mg daily.  2. Repeat INR in 1 week, 6/23  3. Verbal information provided over the phone. Adelaide Deutsch's RN RBV dosing instructions, expresses understanding by teach back, and has no further questions at this time.    Loco Valle, [place]  06/16/20  1:12 PM    I, Rhonda Singh, PharmD, have reviewed the note in full and agree with the assessment and plan.  06/16/20  13:37  "

## 2020-06-23 NOTE — PROGRESS NOTES
Anticoagulation Clinic - Remote Progress Note  [facility]  Frequency of Monitorin-4x a a month    Indication: chronic afib  Referring Provider: Luiza (last seen 19  next visit 20)  Goal INR: 2.0-3.0  Current Drug Interactions: Omeprazole, MVI, CoQ10, Levothyroxine, celexa  [id number]: 4 [HTN, Age>75, Female]  HAS-BLED: 2 (HTN, Elderly), fall 20  Clinic: 2018    Diet: Green beans, glenroy slaw once week, iceberg salad ~2x a week (4/15/20)  Alcohol: None  Tobacco: None  OTC Pain Medication: Tylenol    INR History:  Date 12/11 12/20 1/3/18 1/18 1/31 2/14 2/22 2/28 3/7 3/23 4/2 4/11 4/25   Total Weekly Dose 27.5 mg 27.5 mg 27.5  mg 27.5 mg   27.5mg 27.5 mg 25mg 27.5mg 27.5  mg 27.5 mg 27.5mg 27.5 mg 27.5  mg   INR 2.7 3.0 3.0 2.7 2.5 3.5 2.2 2.9 2.4 3.1 2.2 2.9 2.8   Notes       Held 1x    1 decr dose       Date 5/10 5/17 5/23 6/6 6/13 6/20 6/22 6/27 7/1 7/5   Total Weekly Dose 25mg 27.5mg 27.5mg 26.25mg 27.5mg 25mg 20mg 20mg 20mg 17.5mg   INR 1.9 2.3 2.4 3.1 3.8; 4.0 3.9 2.4 3.2 2.1 2.6   Notes missed 1x dose, self adj   Less GLV; daughter [place] [facility]; Less GLV    More GLV; dose decr.      Date 7/11 7/18 7/25 8/1 8/7 8/15 8/22 9/5 9/10 9/17   Total Weekly Dose 17.5mg 18.75mg 18.75mg 20mg 22.5mg 20mg 20mg 20mg 22.5mg 22.5mg   INR 1.9 1.7 2.0 1.5 1.8 2.1 2.1 1.5 1.7 1.5   Notes     boost boost   boost      Date 9/24 10/1 10/15 10/22 10/29 11/5 11/19 11/26 12/10 12/24 12/31   Total Weekly Dose 23.75  mg 22.5mg 22.5mg 18.75mg 22.5mg 23.75mg 23.75mg 23.75mg 23.75mg 22.5mg 23.75mg   INR 2.1 2.1 2.7 1.8 1.9 2.1 2.4 2.5 2.2 1.9 2.3   Notes boost   missed 10/15 x 1  1x incr dose    Mis-dose      Date 1/14/19 1/28 2/11 2/22 2/28 3/7 3/20 3/27 4/10 4/24 5/1   Total Weekly Dose 23.75  mg 23.75  mg 23.75  mg 23.75  mg 23.75  mg 23.75  mg 23.75  mg 23.75  mg 23.75  mg 23.75  mg 23.75  mg   INR 2.3 2.0 2.1 2.4 2.8 2.4 2.3 2.8 2.9 1.8 1.9   Notes  1 miss     fall         Date 5/8 5/15 5/22 6/5  6/19  6/26 7/3 7/8 7/15 7/22 8/5   Total Weekly Dose 25mg 23.75  mg 23.75  mg 23.75  mg 23.75  mg 25 mg 23.75mg 25mg 25mg 25mg 25mg   INR 2.2 2.6 2.5 2.1  2.0 2.6 1.5 2.0 2.2 2.1 1.8   Notes     incr GLV at [facility]  missed dose bruising        Date 8/15 8/26 9/3 9/10 9/24 10/1 10/8 10/15 10/21 10/28 11/4   Total Weekly Dose 25mg 25mg 25mg 25mg 25mg 26.25  mg 25mg 21.25  mg 22.5mg 23.75  mg 23.75  mg   INR 3.2 2.6 2.6 2.4 1.6 2.3 4.8 (POCT)  3.75 (ryan) 4.0 2.3 2.5 2.4   Notes      1x boost          Date 11/11 11/15 11/21 12/3 12/10 12/17  12/23 12/30 1/6/20 1/10 1/17   Total Weekly Dose 23.75  mg 23.75  mg 25mg 25mg 25mg 23.75  mg  21.25 mg 21.25 21.25mg 18.75 20 mg   INR 1.9 1.9 2.4 3.1 3.3 3.4  3.5 2.9 4.1 3.0 2.3   Notes   Boost x1 less GLV   1x hold   1x hold      Date 1/24 2/7 2/14 2/21 2/28 3/3 3/6 3/9 3/13 3/16 3/17 3/20  3/25 4/1 4/8   Total Weekly Dose 20mg 20mg 21.75 mg 20mg  21.25  mg 20mg 18.75  mg 15mg 15mg 16.25  mg 16.25  mg 13.75  mg 16.25  mg 17.5mg 17.5mg   INR 2.4 1.9 3.5 1.6 2.4 3.6 4.6 3.4 3.2 4.5 2.3 2.0  1.9 2.2 1.9   Notes      doxy Bumex,  d/c lasix  doxy doxy complete 3/5 x1 hold; x1 decr x1 decr x2 decr 1x hold 1 decr Dose inc  fall     Date 4/15 4/22 4/27 5/1 5/5 5/8 5/12 5/15 5/19 5/26 6/1 6/8 6/16 6/23    Total Weekly Dose 17.5mg 17.5mg 16.25mg 15mg 15mg 15mg 13.75 15mg 18.75mg 15mg 15mg 17.5mg 17.5mg 17.5mg    INR 2.6 2.7 3.7 3.3 2.9 3.2 1.4 1.6 2.7 1.9 1.9 2.3 2.1 1.6    Notes   Tramadol  1x dose red tramadol 2x dose red off tramadol 1x dec 1x dec             Phone Interview:  Tablet Strength: 2.5mg and 7.5mg tablet  Patient Contact Info: Mikayla Chaves, Cell (daughter) [phone]  [facility] in [place] Contact: Christiana (or Hawa) [phone] **preferred**    Patient Findings   Negatives:  Signs/symptoms of thrombosis, Signs/symptoms of bleeding, Laboratory test error suspected, Change in health, Change in alcohol use, Change in activity, Upcoming invasive procedure, Emergency department  visit, Upcoming dental procedure, Missed doses, Extra doses, Change in medications, Change in diet/appetite, Hospital admission, Bruising, Other complaints   Comments:  Christiana denies all findings         Plan:   1. INR is SUBtherapeutic today at 1.6, no likely cause. Based on trend 5/12, instructed MARIANO Villeda, to have Ms. Deutsch BOOST tonight's dose to 3.75mg then continue warfarin 2.5mg daily.  2. Repeat INR in 1 week, 6/23  3. Verbal information provided over the phone. Adelaide Deutsch's RN RBV dosing instructions, expresses understanding by teach back, and has no further questions at this time.    Erinn Aldrich, PharmD.  06/23/20   10:26

## 2020-06-30 NOTE — PROGRESS NOTES
Anticoagulation Clinic - Remote Progress Note  [facility]  Frequency of Monitorin-4x a a month    Indication: chronic afib  Referring Provider: Luiza (last seen 19  next visit 20)  Goal INR: 2.0-3.0  Current Drug Interactions: Omeprazole, MVI, CoQ10, Levothyroxine, celexa  [id number]: 4 [HTN, Age>75, Female]  HAS-BLED: 2 (HTN, Elderly), fall 20  Clinic: 2018    Diet: Green beans, glenroy slaw once week, iceberg salad ~2x a week (4/15/20)  Alcohol: None  Tobacco: None  OTC Pain Medication: Tylenol    INR History:  Date 12/11 12/20 1/3/18 1/18 1/31 2/14 2/22 2/28 3/7 3/23 4/2 4/11 4/25   Total Weekly Dose 27.5 mg 27.5 mg 27.5  mg 27.5 mg   27.5mg 27.5 mg 25mg 27.5mg 27.5  mg 27.5 mg 27.5mg 27.5 mg 27.5  mg   INR 2.7 3.0 3.0 2.7 2.5 3.5 2.2 2.9 2.4 3.1 2.2 2.9 2.8   Notes       Held 1x    1 decr dose       Date 5/10 5/17 5/23 6/6 6/13 6/20 6/22 6/27 7/1 7/5   Total Weekly Dose 25mg 27.5mg 27.5mg 26.25mg 27.5mg 25mg 20mg 20mg 20mg 17.5mg   INR 1.9 2.3 2.4 3.1 3.8; 4.0 3.9 2.4 3.2 2.1 2.6   Notes missed 1x dose, self adj   Less GLV; daughter [place] [facility]; Less GLV    More GLV; dose decr.      Date 7/11 7/18 7/25 8/1 8/7 8/15 8/22 9/5 9/10 9/17   Total Weekly Dose 17.5mg 18.75mg 18.75mg 20mg 22.5mg 20mg 20mg 20mg 22.5mg 22.5mg   INR 1.9 1.7 2.0 1.5 1.8 2.1 2.1 1.5 1.7 1.5   Notes     boost boost   boost      Date 9/24 10/1 10/15 10/22 10/29 11/5 11/19 11/26 12/10 12/24 12/31   Total Weekly Dose 23.75  mg 22.5mg 22.5mg 18.75mg 22.5mg 23.75mg 23.75mg 23.75mg 23.75mg 22.5mg 23.75mg   INR 2.1 2.1 2.7 1.8 1.9 2.1 2.4 2.5 2.2 1.9 2.3   Notes boost   missed 10/15 x 1  1x incr dose    Mis-dose      Date 1/14/19 1/28 2/11 2/22 2/28 3/7 3/20 3/27 4/10 4/24 5/1   Total Weekly Dose 23.75  mg 23.75  mg 23.75  mg 23.75  mg 23.75  mg 23.75  mg 23.75  mg 23.75  mg 23.75  mg 23.75  mg 23.75  mg   INR 2.3 2.0 2.1 2.4 2.8 2.4 2.3 2.8 2.9 1.8 1.9   Notes  1 miss     fall         Date 5/8 5/15 5/22 6/5  6/19  6/26 7/3 7/8 7/15 7/22 8/5   Total Weekly Dose 25mg 23.75  mg 23.75  mg 23.75  mg 23.75  mg 25 mg 23.75mg 25mg 25mg 25mg 25mg   INR 2.2 2.6 2.5 2.1  2.0 2.6 1.5 2.0 2.2 2.1 1.8   Notes     incr GLV at [facility]  missed dose bruising        Date 8/15 8/26 9/3 9/10 9/24 10/1 10/8 10/15 10/21 10/28 11/4   Total Weekly Dose 25mg 25mg 25mg 25mg 25mg 26.25  mg 25mg 21.25  mg 22.5mg 23.75  mg 23.75  mg   INR 3.2 2.6 2.6 2.4 1.6 2.3 4.8 (POCT)  3.75 (ryan) 4.0 2.3 2.5 2.4   Notes      1x boost          Date 11/11 11/15 11/21 12/3 12/10 12/17  12/23 12/30 1/6/20 1/10 1/17   Total Weekly Dose 23.75  mg 23.75  mg 25mg 25mg 25mg 23.75  mg  21.25 mg 21.25 21.25mg 18.75 20 mg   INR 1.9 1.9 2.4 3.1 3.3 3.4  3.5 2.9 4.1 3.0 2.3   Notes   Boost x1 less GLV   1x hold   1x hold      Date 1/24 2/7 2/14 2/21 2/28 3/3 3/6 3/9 3/13 3/16 3/17 3/20  3/25 4/1 4/8   Total Weekly Dose 20mg 20mg 21.75 mg 20mg  21.25  mg 20mg 18.75  mg 15mg 15mg 16.25  mg 16.25  mg 13.75  mg 16.25  mg 17.5mg 17.5mg   INR 2.4 1.9 3.5 1.6 2.4 3.6 4.6 3.4 3.2 4.5 2.3 2.0  1.9 2.2 1.9   Notes      doxy Bumex,  d/c lasix  doxy doxy complete 3/5 x1 hold; x1 decr x1 decr x2 decr 1x hold 1 decr Dose inc  fall     Date 4/15 4/22 4/27 5/1 5/5 5/8 5/12 5/15 5/19 5/26 6/1 6/8 6/16 6/23 6/30   Total Weekly Dose 17.5mg 17.5mg 16.25mg 15mg 15mg 15mg 13.75 15mg 18.75mg 15mg 15mg 17.5mg 17.5mg 17.5mg 18.75mg   INR 2.6 2.7 3.7 3.3 2.9 3.2 1.4 1.6 2.7 1.9 1.9 2.3 2.1 1.6 1.9   Notes   Tramadol  1x dose red tramadol 2x dose red off tramadol 1x dec 1x dec             Phone Interview:  Tablet Strength: 2.5mg and 7.5mg tablet  Patient Contact Info: Mikayla Chaves, Cell (daughter) [phone]  [facility] Contact: Christiana (or Hawa) [phone] **preferred**    Patient Findings     Negatives:  Signs/symptoms of thrombosis, Signs/symptoms of bleeding, Laboratory test error suspected, Change in health, Change in alcohol use, Change in activity, Upcoming invasive procedure,  Emergency department visit, Upcoming dental procedure, Missed doses, Extra doses, Change in medications, Change in diet/appetite, Hospital admission, Bruising, Other complaints   Comments:  Per Christiana, all negative             Plan:   1. INR remains slightly SUBtherapeutic today at 1.9. Instructed MARIANO Villeda, to again have Ms. Deutsch BOOST tonight's dose to 3.75mg then continue warfarin 2.5mg daily.  2. Repeat INR in 1 week  3. Verbal information provided over the phone. Adelaide Deutsch's RN RBV dosing instructions, expresses understanding by teach back, and has no further questions at this time.      Erinn Aldrich, PharmD.  06/30/20   12:14

## 2020-07-07 NOTE — PROGRESS NOTES
Anticoagulation Clinic - Remote Progress Note  [facility]  Frequency of Monitorin-4x a a month    Indication: chronic afib  Referring Provider: Luiza (last seen 19  next visit 20)  Goal INR: 2.0-3.0  Current Drug Interactions: Omeprazole, MVI, CoQ10, Levothyroxine, celexa  [id number]: 4 [HTN, Age>75, Female]  HAS-BLED: 2 (HTN, Elderly), fall 20  Clinic: 2018    Diet: Green beans, glenroy slaw once week, iceberg salad ~2x a week (4/15/20)  Alcohol: None  Tobacco: None  OTC Pain Medication: Tylenol    INR History:  Date 12/11 12/20 1/3/18 1/18 1/31 2/14 2/22 2/28 3/7 3/23 4/2 4/11 4/25   Total Weekly Dose 27.5 mg 27.5 mg 27.5  mg 27.5 mg   27.5mg 27.5 mg 25mg 27.5mg 27.5  mg 27.5 mg 27.5mg 27.5 mg 27.5  mg   INR 2.7 3.0 3.0 2.7 2.5 3.5 2.2 2.9 2.4 3.1 2.2 2.9 2.8   Notes       Held 1x    1 decr dose       Date 5/10 5/17 5/23 6/6 6/13 6/20 6/22 6/27 7/1 7/5   Total Weekly Dose 25mg 27.5mg 27.5mg 26.25mg 27.5mg 25mg 20mg 20mg 20mg 17.5mg   INR 1.9 2.3 2.4 3.1 3.8; 4.0 3.9 2.4 3.2 2.1 2.6   Notes missed 1x dose, self adj   Less GLV; daughter [facility]; Less GLV    More GLV; dose decr.      Date 7/11 7/18 7/25 8/1 8/7 8/15 8/22 9/5 9/10 9/17   Total Weekly Dose 17.5mg 18.75mg 18.75mg 20mg 22.5mg 20mg 20mg 20mg 22.5mg 22.5mg   INR 1.9 1.7 2.0 1.5 1.8 2.1 2.1 1.5 1.7 1.5   Notes     boost boost   boost      Date 9/24 10/1 10/15 10/22 10/29 11/5 11/19 11/26 12/10 12/24 12/31   Total Weekly Dose 23.75  mg 22.5mg 22.5mg 18.75mg 22.5mg 23.75mg 23.75mg 23.75mg 23.75mg 22.5mg 23.75mg   INR 2.1 2.1 2.7 1.8 1.9 2.1 2.4 2.5 2.2 1.9 2.3   Notes boost   missed 10/15 x 1  1x incr dose    Mis-dose      Date 1/14/19 1/28 2/11 2/22 2/28 3/7 3/20 3/27 4/10 4/24 5/1   Total Weekly Dose 23.75  mg 23.75  mg 23.75  mg 23.75  mg 23.75  mg 23.75  mg 23.75  mg 23.75  mg 23.75  mg 23.75  mg 23.75  mg   INR 2.3 2.0 2.1 2.4 2.8 2.4 2.3 2.8 2.9 1.8 1.9   Notes  1 miss     fall         Date 5/8 5/15 5/22 6/5  6/19  6/26 7/3 7/8 7/15 7/22 8/5   Total Weekly Dose 25mg 23.75  mg 23.75  mg 23.75  mg 23.75  mg 25 mg 23.75mg 25mg 25mg 25mg 25mg   INR 2.2 2.6 2.5 2.1  2.0 2.6 1.5 2.0 2.2 2.1 1.8   Notes     incr GLV at [facility]  missed dose bruising        Date 8/15 8/26 9/3 9/10 9/24 10/1 10/8 10/15 10/21 10/28 11/4   Total Weekly Dose 25mg 25mg 25mg 25mg 25mg 26.25  mg 25mg 21.25  mg 22.5mg 23.75  mg 23.75  mg   INR 3.2 2.6 2.6 2.4 1.6 2.3 4.8 (POCT)  3.75 (ryan) 4.0 2.3 2.5 2.4   Notes      1x boost          Date 11/11 11/15 11/21 12/3 12/10 12/17  12/23 12/30 1/6/20 1/10 1/17   Total Weekly Dose 23.75  mg 23.75  mg 25mg 25mg 25mg 23.75  mg  21.25 mg 21.25 21.25mg 18.75 20 mg   INR 1.9 1.9 2.4 3.1 3.3 3.4  3.5 2.9 4.1 3.0 2.3   Notes   Boost x1 less GLV   1x hold   1x hold      Date 1/24 2/7 2/14 2/21 2/28 3/3 3/6 3/9 3/13 3/16 3/17 3/20  3/25 4/1 4/8   Total Weekly Dose 20mg 20mg 21.75 mg 20mg  21.25  mg 20mg 18.75  mg 15mg 15mg 16.25  mg 16.25  mg 13.75  mg 16.25  mg 17.5mg 17.5mg   INR 2.4 1.9 3.5 1.6 2.4 3.6 4.6 3.4 3.2 4.5 2.3 2.0  1.9 2.2 1.9   Notes      doxy Bumex,  d/c lasix  doxy doxy complete 3/5 x1 hold; x1 decr x1 decr x2 decr 1x hold 1 decr Dose inc  fall     Date 4/15 4/22 4/27 5/1 5/5 5/8 5/12 5/15 5/19 5/26 6/1 6/8 6/16 6/23 6/30  7/6   Total Weekly Dose 17.5mg 17.5mg 16.25mg 15mg 15mg 15mg 13.75 15mg 18.75mg 15mg 15mg 17.5mg 17.5mg 17.5mg 18.75  mg  18.75 mg   INR 2.6 2.7 3.7 3.3 2.9 3.2 1.4 1.6 2.7 1.9 1.9 2.3 2.1 1.6 1.9  1.9   Notes   Tramadol  1x dose red tramadol 2x dose red off tramadol 1x dec 1x dec              Date             Total Weekly Dose 20 mg            INR            Notes              Phone Interview:  Tablet Strength: 2.5mg and 7.5mg tablet  Patient Contact Info: Mikayla Anastacio, Cell (daughter) [phone]  [facility] [place] Contact: Christiana (or Hawa) [phone] **preferred**    Patient Findings   Negatives:  Signs/symptoms of thrombosis, Signs/symptoms of bleeding, Laboratory test error  suspected, Change in health, Change in alcohol use, Change in activity, Upcoming invasive procedure, Emergency department visit, Upcoming dental procedure, Missed doses, Extra doses, Change in medications, Change in diet/appetite, Hospital admission, Bruising, Other complaints   Comments:  MARIANO Plasencia denies all findings for patient.     Plan:   1. INR remains slightly SUBtherapeutic today, again at 1.9. After consulting with Reena Moore, PharmD, instructed MARIANO Plasencia, to have Ms. Deutsch take warfarin 2.5mg oral daily except for 3.75mg on TuesFri until recheck (20 mg/week, 7.1% increase from last week's dosing)  2. Repeat INR in 1 week, 7/14  3. Verbal information provided over the phone. Adelaide Deutsch's RN [facility] dosing instructions, expresses understanding by teach back, and has no further questions at this time.    Loco Valle CPhT  7/7/2020  10:40    I, Reena Moore, [facility], have reviewed the note in full and agree with the assessment and plan.  07/09/20  10:06

## 2020-07-14 NOTE — PROGRESS NOTES
Anticoagulation Clinic - Remote Progress Note  [employer] [employer]  Frequency of Monitorin-4x a a month    Indication: chronic afib  Referring Provider: Luiza (last seen 19  next visit 20)  Goal INR: 2.0-3.0  Current Drug Interactions: Omeprazole, MVI, CoQ10, Levothyroxine, celexa  [id number]: 4 [HTN, Age>75, Female]  HAS-BLED: 2 (HTN, Elderly), fall 20  Clinic: 2018    Diet: Green beans, glenroy slaw once week, iceberg salad ~2x a week (4/15/20)  Alcohol: None  Tobacco: None  OTC Pain Medication: Tylenol    INR History:  Date 12/11 12/20 1/3/18 1/18 1/31 2/14 2/22 2/28 3/7 3/23 4/2 4/11 4/25   Total Weekly Dose 27.5 mg 27.5 mg 27.5  mg 27.5 mg   27.5mg 27.5 mg 25mg 27.5mg 27.5  mg 27.5 mg 27.5mg 27.5 mg 27.5  mg   INR 2.7 3.0 3.0 2.7 2.5 3.5 2.2 2.9 2.4 3.1 2.2 2.9 2.8   Notes       Held 1x    1 decr dose       Date 5/10 5/17 5/23 6/6 6/13 6/20 6/22 6/27 7/1 7/5   Total Weekly Dose 25mg 27.5mg 27.5mg 26.25mg 27.5mg 25mg 20mg 20mg 20mg 17.5mg   INR 1.9 2.3 2.4 3.1 3.8; 4.0 3.9 2.4 3.2 2.1 2.6   Notes missed 1x dose, self adj   Less GLV; daughter [place] clinic; Less GLV    More GLV; dose decr.      Date 7/11 7/18 7/25 8/1 8/7 8/15 8/22 9/5 9/10 9/17   Total Weekly Dose 17.5mg 18.75mg 18.75mg 20mg 22.5mg 20mg 20mg 20mg 22.5mg 22.5mg   INR 1.9 1.7 2.0 1.5 1.8 2.1 2.1 1.5 1.7 1.5   Notes     boost boost   boost      Date 9/24 10/1 10/15 10/22 10/29 11/5 11/19 11/26 12/10 12/24 12/31   Total Weekly Dose 23.75  mg 22.5mg 22.5mg 18.75mg 22.5mg 23.75mg 23.75mg 23.75mg 23.75mg 22.5mg 23.75mg   INR 2.1 2.1 2.7 1.8 1.9 2.1 2.4 2.5 2.2 1.9 2.3   Notes boost   missed 10/15 x 1  1x incr dose    Mis-dose      Date 1/14/19 1/28 2/11 2/22 2/28 3/7 3/20 3/27 4/10 4/24 5/1   Total Weekly Dose 23.75  mg 23.75  mg 23.75  mg 23.75  mg 23.75  mg 23.75  mg 23.75  mg 23.75  mg 23.75  mg 23.75  mg 23.75  mg   INR 2.3 2.0 2.1 2.4 2.8 2.4 2.3 2.8 2.9 1.8 1.9   Notes  1 miss     fall         Date 5/8 5/15 5/22 6/5  6/19  6/26 7/3 7/8 7/15 7/22 8/5   Total Weekly Dose 25mg 23.75  mg 23.75  mg 23.75  mg 23.75  mg 25 mg 23.75mg 25mg 25mg 25mg 25mg   INR 2.2 2.6 2.5 2.1  2.0 2.6 1.5 2.0 2.2 2.1 1.8   Notes     incr GLV at [place]  missed dose bruising        Date 8/15 8/26 9/3 9/10 9/24 10/1 10/8 10/15 10/21 10/28 11/4   Total Weekly Dose 25mg 25mg 25mg 25mg 25mg 26.25  mg 25mg 21.25  mg 22.5mg 23.75  mg 23.75  mg   INR 3.2 2.6 2.6 2.4 1.6 2.3 4.8 (POCT)  3.75 (ryan) 4.0 2.3 2.5 2.4   Notes      1x boost          Date 11/11 11/15 11/21 12/3 12/10 12/17  12/23 12/30 1/6/20 1/10 1/17   Total Weekly Dose 23.75  mg 23.75  mg 25mg 25mg 25mg 23.75  mg  21.25 mg 21.25 21.25mg 18.75 20 mg   INR 1.9 1.9 2.4 3.1 3.3 3.4  3.5 2.9 4.1 3.0 2.3   Notes   Boost x1 less GLV   1x hold   1x hold      Date 1/24 2/7 2/14 2/21 2/28 3/3 3/6 3/9 3/13 3/16 3/17 3/20  3/25 4/1 4/8   Total Weekly Dose 20mg 20mg 21.75 mg 20mg  21.25  mg 20mg 18.75  mg 15mg 15mg 16.25  mg 16.25  mg 13.75  mg 16.25  mg 17.5mg 17.5mg   INR 2.4 1.9 3.5 1.6 2.4 3.6 4.6 3.4 3.2 4.5 2.3 2.0  1.9 2.2 1.9   Notes      doxy Bumex,  d/c lasix  doxy doxy complete 3/5 x1 hold; x1 decr x1 decr x2 decr 1x hold 1 decr Dose inc  fall     Date 4/15 4/22 4/27 5/1 5/5 5/8 5/12 5/15 5/19 5/26 6/1 6/8 6/16 6/23 6/30  7/6   Total Weekly Dose 17.5mg 17.5mg 16.25mg 15mg 15mg 15mg 13.75 15mg 18.75mg 15mg 15mg 17.5mg 17.5mg 17.5mg 18.75  mg  18.75 mg   INR 2.6 2.7 3.7 3.3 2.9 3.2 1.4 1.6 2.7 1.9 1.9 2.3 2.1 1.6 1.9  1.9   Notes   Tramadol  1x dose red tramadol 2x dose red off tramadol 1x dec 1x dec              Date  7/14           Total Weekly Dose 20 mg            INR 2.1           Notes              Phone Interview:  Tablet Strength: 2.5mg and 7.5mg tablet  Patient Contact Info: Mikayla Chaves, Cell (daughter) [phone]  [facility] [place] Contact: Christiana (or [place]a) [phone] **preferred**    Patient Findings   Negatives:  Signs/symptoms of thrombosis, Signs/symptoms of bleeding, Laboratory test  error suspected, Change in health, Change in alcohol use, Change in activity, Upcoming invasive procedure, Emergency department visit, Upcoming dental procedure, Missed doses, Extra doses, Change in medications, Change in diet/appetite, Hospital admission, Bruising, Other complaints   Comments:  Left message with front office for RN to continue last weeks dosing regimen and to recheck in a week. There isn't a need to call back unless patient's medications have changed, upcoming procedure, dietary change, or missed doses.      Plan:   1. INR is back to being therapeutic today, at 2.1.instructed RN Erinn, to have Ms. Deutsch continue taking warfarin 2.5mg oral daily except for 3.75mg on TuesFri until recheck   2. Repeat INR in 1 week, 7/21  3. Verbal information provided over the phone. Adelaide Deutsch's RN RBV dosing instructions, expresses understanding by teach back, and has no further questions at this time.    Jackelyn Travis, Pharmacy Intern  07/14/20  10:03    I, Loco Freedman, [facility], have reviewed the note in full and agree with the assessment and plan.  07/14/20  10:20

## 2020-07-21 NOTE — PROGRESS NOTES
Anticoagulation Clinic - Remote Progress Note  [employer] [employer]  Frequency of Monitorin-4x a a month    Indication: chronic afib  Referring Provider: Luiza (last seen 19  next visit 20)  Goal INR: 2.0-3.0  Current Drug Interactions: Omeprazole, MVI, CoQ10, Levothyroxine, celexa  [id number]: 4 [HTN, Age>75, Female]  HAS-BLED: 2 (HTN, Elderly), fall 20  Clinic: 2018    Diet: Green beans, glenroy slaw once week, iceberg salad ~2x a week (4/15/20)  Alcohol: None  Tobacco: None  OTC Pain Medication: Tylenol    INR History:  Date 12/11 12/20 1/3/18 1/18 1/31 2/14 2/22 2/28 3/7 3/23 4/2 4/11 4/25   Total Weekly Dose 27.5 mg 27.5 mg 27.5  mg 27.5 mg   27.5mg 27.5 mg 25mg 27.5mg 27.5  mg 27.5 mg 27.5mg 27.5 mg 27.5  mg   INR 2.7 3.0 3.0 2.7 2.5 3.5 2.2 2.9 2.4 3.1 2.2 2.9 2.8   Notes       Held 1x    1 decr dose       Date 5/10 5/17 5/23 6/6 6/13 6/20 6/22 6/27 7/1 7/5   Total Weekly Dose 25mg 27.5mg 27.5mg 26.25mg 27.5mg 25mg 20mg 20mg 20mg 17.5mg   INR 1.9 2.3 2.4 3.1 3.8; 4.0 3.9 2.4 3.2 2.1 2.6   Notes missed 1x dose, self adj   Less GLV; daughter [place] clinic; Less GLV    More GLV; dose decr.      Date 7/11 7/18 7/25 8/1 8/7 8/15 8/22 9/5 9/10 9/17   Total Weekly Dose 17.5mg 18.75mg 18.75mg 20mg 22.5mg 20mg 20mg 20mg 22.5mg 22.5mg   INR 1.9 1.7 2.0 1.5 1.8 2.1 2.1 1.5 1.7 1.5   Notes     boost boost   boost      Date 9/24 10/1 10/15 10/22 10/29 11/5 11/19 11/26 12/10 12/24 12/31   Total Weekly Dose 23.75  mg 22.5mg 22.5mg 18.75mg 22.5mg 23.75mg 23.75mg 23.75mg 23.75mg 22.5mg 23.75mg   INR 2.1 2.1 2.7 1.8 1.9 2.1 2.4 2.5 2.2 1.9 2.3   Notes boost   missed 10/15 x 1  1x incr dose    Mis-dose      Date 1/14/19 1/28 2/11 2/22 2/28 3/7 3/20 3/27 4/10 4/24 5/1   Total Weekly Dose 23.75  mg 23.75  mg 23.75  mg 23.75  mg 23.75  mg 23.75  mg 23.75  mg 23.75  mg 23.75  mg 23.75  mg 23.75  mg   INR 2.3 2.0 2.1 2.4 2.8 2.4 2.3 2.8 2.9 1.8 1.9   Notes  1 miss     fall         Date 5/8 5/15 5/22 6/5  6/19  6/26 7/3 7/8 7/15 7/22 8/5   Total Weekly Dose 25mg 23.75  mg 23.75  mg 23.75  mg 23.75  mg 25 mg 23.75mg 25mg 25mg 25mg 25mg   INR 2.2 2.6 2.5 2.1  2.0 2.6 1.5 2.0 2.2 2.1 1.8   Notes     incr GLV at [place]  missed dose bruising        Date 8/15 8/26 9/3 9/10 9/24 10/1 10/8 10/15 10/21 10/28 11/4   Total Weekly Dose 25mg 25mg 25mg 25mg 25mg 26.25  mg 25mg 21.25  mg 22.5mg 23.75  mg 23.75  mg   INR 3.2 2.6 2.6 2.4 1.6 2.3 4.8 (POCT)  3.75 (ryan) 4.0 2.3 2.5 2.4   Notes      1x boost          Date 11/11 11/15 11/21 12/3 12/10 12/17  12/23 12/30 1/6/20 1/10 1/17   Total Weekly Dose 23.75  mg 23.75  mg 25mg 25mg 25mg 23.75  mg  21.25 mg 21.25 21.25mg 18.75 20 mg   INR 1.9 1.9 2.4 3.1 3.3 3.4  3.5 2.9 4.1 3.0 2.3   Notes   Boost x1 less GLV   1x hold   1x hold      Date 1/24 2/7 2/14 2/21 2/28 3/3 3/6 3/9 3/13 3/16 3/17 3/20  3/25 4/1 4/8   Total Weekly Dose 20mg 20mg 21.75 mg 20mg  21.25  mg 20mg 18.75  mg 15mg 15mg 16.25  mg 16.25  mg 13.75  mg 16.25  mg 17.5mg 17.5mg   INR 2.4 1.9 3.5 1.6 2.4 3.6 4.6 3.4 3.2 4.5 2.3 2.0  1.9 2.2 1.9   Notes      doxy Bumex,  d/c lasix  doxy doxy complete 3/5 x1 hold; x1 decr x1 decr x2 decr 1x hold 1 decr Dose inc  fall     Date 4/15 4/22 4/27 5/1 5/5 5/8 5/12 5/15 5/19 5/26 6/1 6/8 6/16 6/23 6/30  7/6   Total Weekly Dose 17.5mg 17.5mg 16.25mg 15mg 15mg 15mg 13.75 15mg 18.75mg 15mg 15mg 17.5mg 17.5mg 17.5mg 18.75  mg  18.75 mg   INR 2.6 2.7 3.7 3.3 2.9 3.2 1.4 1.6 2.7 1.9 1.9 2.3 2.1 1.6 1.9  1.9   Notes   Tramadol  1x dose red tramadol 2x dose red off tramadol 1x dec 1x dec              Date  7/14 7/21          Total Weekly Dose 20 mg  20 mg          INR 2.1 1.5          Notes              Phone Interview:  Tablet Strength: 2.5mg and 7.5mg tablet  Patient Contact Info: Mikayla Chaves, Cell (daughter) [phone]  [facility] [place] in [place] Contact: Danyelle (or [address] [place] [address] nurse) [phone] **preferred**  Patient Findings     Negatives:  Signs/symptoms of thrombosis, Signs/symptoms of  bleeding, Laboratory test error suspected, Change in health, Change in alcohol use, Change in activity, Upcoming invasive procedure, Emergency department visit, Upcoming dental procedure, Missed doses, Extra doses, Change in medications, Change in diet/appetite, Hospital admission, Bruising, Other complaints   Comments:  There were no missed doses last week per Christiana who looked at the patient chart. Spoke with new RN, Danyelle, who was unaware of any changes as well. All findings negative.          Plan:   1. INR is subtherapeutic today, at 1.5. Instructed RN Danyelle, to have Ms. Deutsch take warfarin 2.5mg oral daily except for 3.75mg on TuesWedFri until recheck next week. There was no explanation from the staff as to why Ms. Deutsch's INR would become subtherapeutic. Of note, the patient has moved to another side of the building and I was unable to speak to anyone who directly cared for the patient over the past 7 days.  2. Repeat INR in 1 week, 7/28  3. Verbal information provided over the phone. Adelaide Deutsch's RN RBV dosing instructions, expresses understanding by teach back, and has no further questions at this time.    Sharath Leal, [facility]  07/21/20  08:13

## 2020-07-28 NOTE — PROGRESS NOTES
Anticoagulation Clinic - Remote Progress Note  [employer] [employer]  Frequency of Monitorin-4x a a month    Indication: chronic afib  Referring Provider: Luiza (last seen 19  next visit 20)  Goal INR: 2.0-3.0  Current Drug Interactions: Omeprazole, MVI, CoQ10, Levothyroxine, celexa  [id number]: 4 [HTN, Age>75, Female]  HAS-BLED: 2 (HTN, Elderly), fall 20  Clinic: 2018    Diet: Green beans, glenroy slaw once week, iceberg salad ~2x a week (4/15/20)  Alcohol: None  Tobacco: None  OTC Pain Medication: Tylenol    INR History:  Date 12/11 12/20 1/3/18 1/18 1/31 2/14 2/22 2/28 3/7 3/23 4/2 4/11 4/25   Total Weekly Dose 27.5 mg 27.5 mg 27.5  mg 27.5 mg   27.5mg 27.5 mg 25mg 27.5mg 27.5  mg 27.5 mg 27.5mg 27.5 mg 27.5  mg   INR 2.7 3.0 3.0 2.7 2.5 3.5 2.2 2.9 2.4 3.1 2.2 2.9 2.8   Notes       Held 1x    1 decr dose       Date 5/10 5/17 5/23 6/6 6/13 6/20 6/22 6/27 7/1 7/5   Total Weekly Dose 25mg 27.5mg 27.5mg 26.25mg 27.5mg 25mg 20mg 20mg 20mg 17.5mg   INR 1.9 2.3 2.4 3.1 3.8; 4.0 3.9 2.4 3.2 2.1 2.6   Notes missed 1x dose, self adj   Less GLV; daughter [place] clinic; Less GLV    More GLV; dose decr.      Date 7/11 7/18 7/25 8/1 8/7 8/15 8/22 9/5 9/10 9/17   Total Weekly Dose 17.5mg 18.75mg 18.75mg 20mg 22.5mg 20mg 20mg 20mg 22.5mg 22.5mg   INR 1.9 1.7 2.0 1.5 1.8 2.1 2.1 1.5 1.7 1.5   Notes     boost boost   boost      Date 9/24 10/1 10/15 10/22 10/29 11/5 11/19 11/26 12/10 12/24 12/31   Total Weekly Dose 23.75  mg 22.5mg 22.5mg 18.75mg 22.5mg 23.75mg 23.75mg 23.75mg 23.75mg 22.5mg 23.75mg   INR 2.1 2.1 2.7 1.8 1.9 2.1 2.4 2.5 2.2 1.9 2.3   Notes boost   missed 10/15 x 1  1x incr dose    Mis-dose      Date 1/14/19 1/28 2/11 2/22 2/28 3/7 3/20 3/27 4/10 4/24 5/1   Total Weekly Dose 23.75  mg 23.75  mg 23.75  mg 23.75  mg 23.75  mg 23.75  mg 23.75  mg 23.75  mg 23.75  mg 23.75  mg 23.75  mg   INR 2.3 2.0 2.1 2.4 2.8 2.4 2.3 2.8 2.9 1.8 1.9   Notes  1 miss     fall         Date 5/8 5/15 5/22 6/5  6/19  6/26 7/3 7/8 7/15 7/22 8/5   Total Weekly Dose 25mg 23.75  mg 23.75  mg 23.75  mg 23.75  mg 25 mg 23.75mg 25mg 25mg 25mg 25mg   INR 2.2 2.6 2.5 2.1  2.0 2.6 1.5 2.0 2.2 2.1 1.8   Notes     incr GLV at [place]  missed dose bruising        Date 8/15 8/26 9/3 9/10 9/24 10/1 10/8 10/15 10/21 10/28 11/4   Total Weekly Dose 25mg 25mg 25mg 25mg 25mg 26.25  mg 25mg 21.25  mg 22.5mg 23.75  mg 23.75  mg   INR 3.2 2.6 2.6 2.4 1.6 2.3 4.8 (POCT)  3.75 (ryan) 4.0 2.3 2.5 2.4   Notes      1x boost          Date 11/11 11/15 11/21 12/3 12/10 12/17  12/23 12/30 1/6/20 1/10 1/17   Total Weekly Dose 23.75  mg 23.75  mg 25mg 25mg 25mg 23.75  mg  21.25 mg 21.25 21.25mg 18.75 20 mg   INR 1.9 1.9 2.4 3.1 3.3 3.4  3.5 2.9 4.1 3.0 2.3   Notes   Boost x1 less GLV   1x hold   1x hold      Date 1/24 2/7 2/14 2/21 2/28 3/3 3/6 3/9 3/13 3/16 3/17 3/20  3/25 4/1 4/8   Total Weekly Dose 20mg 20mg 21.75 mg 20mg  21.25  mg 20mg 18.75  mg 15mg 15mg 16.25  mg 16.25  mg 13.75  mg 16.25  mg 17.5mg 17.5mg   INR 2.4 1.9 3.5 1.6 2.4 3.6 4.6 3.4 3.2 4.5 2.3 2.0  1.9 2.2 1.9   Notes      doxy Bumex,  d/c lasix  doxy doxy complete 3/5 x1 hold; x1 decr x1 decr x2 decr 1x hold 1 decr Dose inc  fall     Date 4/15 4/22 4/27 5/1 5/5 5/8 5/12 5/15 5/19 5/26 6/1 6/8 6/16 6/23 6/30  7/6   Total Weekly Dose 17.5mg 17.5mg 16.25mg 15mg 15mg 15mg 13.75 15mg 18.75mg 15mg 15mg 17.5mg 17.5mg 17.5mg 18.75  mg  18.75 mg   INR 2.6 2.7 3.7 3.3 2.9 3.2 1.4 1.6 2.7 1.9 1.9 2.3 2.1 1.6 1.9  1.9   Notes   Tramadol  1x dose red tramadol 2x dose red off tramadol 1x dec 1x dec              Date  7/14 7/21 7/28         Total Weekly Dose 20 mg  20 mg 20mg         INR 2.1 1.5 1.7         Notes   1x dec           Phone Interview:  Tablet Strength: 2.5mg and 7.5mg tablet  Patient Contact Info: Mikayla Chaves, Cell (daughter) [phone]  [facility] in [place] Contact: Danyelle (or [address] [place] [address] nurse) [phone] **preferred**  Patient Findings   Positives:  Missed doses   Negatives:   Signs/symptoms of thrombosis, Signs/symptoms of bleeding, Laboratory test error suspected, Change in health, Change in alcohol use, Change in activity, Upcoming invasive procedure, Emergency department visit, Upcoming dental procedure, Extra doses, Change in medications, Change in diet/appetite, Hospital admission, Bruising, Other complaints   Comments:  Ms. Deutsch only had warfarin 2.5mg instead of instructed warfarin 3.75mg this past wednesday. INR still increased to 1.7. [place] Nurse, reports that she is upset about the room change, but hasn't been agitated/irritable.       Plan:   1. INR is still SUBtherapeutic today, at 1.7. Instructed RN Danyelle, to have Ms. Deutsch take warfarin 2.5mg oral daily except for 3.75mg on TuesWedFri until recheck next week. There was no explanation from the staff as to why Ms. Berrys INR would become subtherapeutic. Of note, the patient has moved to another side of the building and I was unable to speak to anyone who directly cared for the patient over the past 7 days.  2. Repeat INR in 1 week, 8/4  3. Verbal information provided over the phone. Adelaide Deutsch's RN RBV dosing instructions, expresses understanding by teach back, and has no further questions at this time.    Jackelyn Travis, Pharmacy Intern  07/28/20   08:21      I, Erinn Aldrich, [facility], have reviewed the note in full and agree with the assessment and plan.  07/28/20  12:02

## 2020-08-04 NOTE — PROGRESS NOTES
Anticoagulation Clinic - Remote Progress Note  [employer] [employer]  Frequency of Monitorin-4x a a month    Indication: chronic afib  Referring Provider: Luiza (last seen 19  next visit 20)  Goal INR: 2.0-3.0  Current Drug Interactions: Omeprazole, MVI, CoQ10, Levothyroxine, celexa  [id number]: 4 [HTN, Age>75, Female]  HAS-BLED: 2 (HTN, Elderly), fall 20  Clinic: 2018    Diet: Green beans, glenroy slaw once week, iceberg salad ~2x a week (4/15/20)  Alcohol: None  Tobacco: None  OTC Pain Medication: Tylenol    INR History:  Date 12/11 12/20 1/3/18 1/18 1/31 2/14 2/22 2/28 3/7 3/23 4/2 4/11 4/25   Total Weekly Dose 27.5 mg 27.5 mg 27.5  mg 27.5 mg   27.5mg 27.5 mg 25mg 27.5mg 27.5  mg 27.5 mg 27.5mg 27.5 mg 27.5  mg   INR 2.7 3.0 3.0 2.7 2.5 3.5 2.2 2.9 2.4 3.1 2.2 2.9 2.8   Notes       Held 1x    1 decr dose       Date 5/10 5/17 5/23 6/6 6/13 6/20 6/22 6/27 7/1 7/5   Total Weekly Dose 25mg 27.5mg 27.5mg 26.25mg 27.5mg 25mg 20mg 20mg 20mg 17.5mg   INR 1.9 2.3 2.4 3.1 3.8; 4.0 3.9 2.4 3.2 2.1 2.6   Notes missed 1x dose, self adj   Less GLV; daughter [place] clinic; Less GLV    More GLV; dose decr.      Date 7/11 7/18 7/25 8/1 8/7 8/15 8/22 9/5 9/10 9/17   Total Weekly Dose 17.5mg 18.75mg 18.75mg 20mg 22.5mg 20mg 20mg 20mg 22.5mg 22.5mg   INR 1.9 1.7 2.0 1.5 1.8 2.1 2.1 1.5 1.7 1.5   Notes     boost boost   boost      Date 9/24 10/1 10/15 10/22 10/29 11/5 11/19 11/26 12/10 12/24 12/31   Total Weekly Dose 23.75  mg 22.5mg 22.5mg 18.75mg 22.5mg 23.75mg 23.75mg 23.75mg 23.75mg 22.5mg 23.75mg   INR 2.1 2.1 2.7 1.8 1.9 2.1 2.4 2.5 2.2 1.9 2.3   Notes boost   missed 10/15 x 1  1x incr dose    Mis-dose      Date 1/14/19 1/28 2/11 2/22 2/28 3/7 3/20 3/27 4/10 4/24 5/1   Total Weekly Dose 23.75  mg 23.75  mg 23.75  mg 23.75  mg 23.75  mg 23.75  mg 23.75  mg 23.75  mg 23.75  mg 23.75  mg 23.75  mg   INR 2.3 2.0 2.1 2.4 2.8 2.4 2.3 2.8 2.9 1.8 1.9   Notes  1 miss     fall         Date 5/8 5/15 5/22 6/5  6/19  6/26 7/3 7/8 7/15 7/22 8/5   Total Weekly Dose 25mg 23.75  mg 23.75  mg 23.75  mg 23.75  mg 25 mg 23.75mg 25mg 25mg 25mg 25mg   INR 2.2 2.6 2.5 2.1  2.0 2.6 1.5 2.0 2.2 2.1 1.8   Notes     incr GLV at [place]  missed dose bruising        Date 8/15 8/26 9/3 9/10 9/24 10/1 10/8 10/15 10/21 10/28 11/4   Total Weekly Dose 25mg 25mg 25mg 25mg 25mg 26.25  mg 25mg 21.25  mg 22.5mg 23.75  mg 23.75  mg   INR 3.2 2.6 2.6 2.4 1.6 2.3 4.8 (POCT)  3.75 (ryan) 4.0 2.3 2.5 2.4   Notes      1x boost          Date 11/11 11/15 11/21 12/3 12/10 12/17  12/23 12/30 1/6/20 1/10 1/17   Total Weekly Dose 23.75  mg 23.75  mg 25mg 25mg 25mg 23.75  mg  21.25 mg 21.25 21.25mg 18.75 20 mg   INR 1.9 1.9 2.4 3.1 3.3 3.4  3.5 2.9 4.1 3.0 2.3   Notes   Boost x1 less GLV   1x hold   1x hold      Date 1/24 2/7 2/14 2/21 2/28 3/3 3/6 3/9 3/13 3/16 3/17 3/20  3/25 4/1 4/8   Total Weekly Dose 20mg 20mg 21.75 mg 20mg  21.25  mg 20mg 18.75  mg 15mg 15mg 16.25  mg 16.25  mg 13.75  mg 16.25  mg 17.5mg 17.5mg   INR 2.4 1.9 3.5 1.6 2.4 3.6 4.6 3.4 3.2 4.5 2.3 2.0  1.9 2.2 1.9   Notes      doxy Bumex,  d/c lasix  doxy doxy complete 3/5 x1 hold; x1 decr x1 decr x2 decr 1x hold 1 decr Dose inc  fall     Date 4/15 4/22 4/27 5/1 5/5 5/8 5/12 5/15 5/19 5/26 6/1 6/8 6/16 6/23 6/30  7/6   Total Weekly Dose 17.5mg 17.5mg 16.25mg 15mg 15mg 15mg 13.75 15mg 18.75mg 15mg 15mg 17.5mg 17.5mg 17.5mg 18.75  mg  18.75 mg   INR 2.6 2.7 3.7 3.3 2.9 3.2 1.4 1.6 2.7 1.9 1.9 2.3 2.1 1.6 1.9  1.9   Notes   Tramadol  1x dose red tramadol 2x dose red off tramadol 1x dec 1x dec              Date  7/14 7/21 7/28  8/3        Total Weekly Dose 20 mg  20 mg 20mg 21.25 mg        INR 2.1 1.5 1.7  1.4        Notes   1x dec           Phone Interview:  Tablet Strength: 2.5mg and 7.5mg tablet  Patient Contact Info: Mikayla Chaves, Cell (daughter) [phone]  [facility] in [place] Contact: Danyelle ([address] nurse) [phone] **preferred**    Patient Findings   Negatives:  Signs/symptoms of  thrombosis, Signs/symptoms of bleeding, Laboratory test error suspected, Change in health, Change in alcohol use, Change in activity, Upcoming invasive procedure, Emergency department visit, Upcoming dental procedure, Missed doses, Extra doses, Change in medications, Change in diet/appetite, Hospital admission, Bruising, Other complaints   Comments:  Derrek Gonzales RN, reviewed MAR and confirmed her dosing (matched tracker)   Her INR trend is troubling, would not expect her INR to continue to fall (now at 1.4) without there being a missed dose, med changes, increased GLV intake, etc...   Will recheck at the end of the week.  Due to her age, will be conservative with boost at this time.  Will probably require another boosted dose on Friday.   Otherwise, above findings negative       Plan:   1. INR remains SUBtherapeutic today at 1.4, despite boosted dose.   Instructed MARIANO Gonzales, to have Ms. Deutsch boost warfarin to 5 mg tonight, then 3.75 mg on Wednesday and 2.5 mg on Thursday prior to recheck on Friday.  . There was no explanation from the staff as to why Ms. Deutsch's INR would become subtherapeutic.   2. Repeat INR on Friday, 8/7  3. Verbal information provided over the phone. Adelaide Get's MARIANO Gonzales RBV dosing instructions, expresses understanding by teach back, and has no further questions at this time.    Reena Moore, PharmD  08/04/20   07:57

## 2020-08-07 NOTE — PROGRESS NOTES
Anticoagulation Clinic - Remote Progress Note  [facility]  Frequency of Monitorin-4x a a month    Indication: chronic afib  Referring Provider: Luiza (last seen 19  next visit 20)  Goal INR: 2.0-3.0  Current Drug Interactions: Omeprazole, MVI, CoQ10, Levothyroxine, celexa  [id number]: 4 [HTN, Age>75, Female]  HAS-BLED: 2 (HTN, Elderly), fall 20  Clinic: 2018    Diet: Green beans, glenroy slaw once week, iceberg salad ~2x a week (4/15/20)  Alcohol: None  Tobacco: None  OTC Pain Medication: Tylenol    INR History:  Date 12/11 12/20 1/3/18 1/18 1/31 2/14 2/22 2/28 3/7 3/23 4/2 4/11 4/25   Total Weekly Dose 27.5 mg 27.5 mg 27.5  mg 27.5 mg   27.5mg 27.5 mg 25mg 27.5mg 27.5  mg 27.5 mg 27.5mg 27.5 mg 27.5  mg   INR 2.7 3.0 3.0 2.7 2.5 3.5 2.2 2.9 2.4 3.1 2.2 2.9 2.8   Notes       Held 1x    1 decr dose       Date 5/10 5/17 5/23 6/6 6/13 6/20 6/22 6/27 7/1 7/5   Total Weekly Dose 25mg 27.5mg 27.5mg 26.25mg 27.5mg 25mg 20mg 20mg 20mg 17.5mg   INR 1.9 2.3 2.4 3.1 3.8; 4.0 3.9 2.4 3.2 2.1 2.6   Notes missed 1x dose, self adj   Less GLV; daughter [facility]; Less GLV    More GLV; dose decr.      Date 7/11 7/18 7/25 8/1 8/7 8/15 8/22 9/5 9/10 9/17   Total Weekly Dose 17.5mg 18.75mg 18.75mg 20mg 22.5mg 20mg 20mg 20mg 22.5mg 22.5mg   INR 1.9 1.7 2.0 1.5 1.8 2.1 2.1 1.5 1.7 1.5   Notes     boost boost   boost      Date 9/24 10/1 10/15 10/22 10/29 11/5 11/19 11/26 12/10 12/24 12/31   Total Weekly Dose 23.75  mg 22.5mg 22.5mg 18.75mg 22.5mg 23.75mg 23.75mg 23.75mg 23.75mg 22.5mg 23.75mg   INR 2.1 2.1 2.7 1.8 1.9 2.1 2.4 2.5 2.2 1.9 2.3   Notes boost   missed 10/15 x 1  1x incr dose    Mis-dose      Date 1/14/19 1/28 2/11 2/22 2/28 3/7 3/20 3/27 4/10 4/24 5/1   Total Weekly Dose 23.75  mg 23.75  mg 23.75  mg 23.75  mg 23.75  mg 23.75  mg 23.75  mg 23.75  mg 23.75  mg 23.75  mg 23.75  mg   INR 2.3 2.0 2.1 2.4 2.8 2.4 2.3 2.8 2.9 1.8 1.9   Notes  1 miss     fall         Date 5/8 5/15 5/22 6/5  6/19  6/26 7/3 7/8 7/15 7/22 8/5   Total Weekly Dose 25mg 23.75  mg 23.75  mg 23.75  mg 23.75  mg 25 mg 23.75mg 25mg 25mg 25mg 25mg   INR 2.2 2.6 2.5 2.1  2.0 2.6 1.5 2.0 2.2 2.1 1.8   Notes     incr GLV at [place]  missed dose bruising        Date 8/15 8/26 9/3 9/10 9/24 10/1 10/8 10/15 10/21 10/28 11/4   Total Weekly Dose 25mg 25mg 25mg 25mg 25mg 26.25  mg 25mg 21.25  mg 22.5mg 23.75  mg 23.75  mg   INR 3.2 2.6 2.6 2.4 1.6 2.3 4.8 (POCT)  3.75 (ryan) 4.0 2.3 2.5 2.4   Notes      1x boost          Date 11/11 11/15 11/21 12/3 12/10 12/17  12/23 12/30 1/6/20 1/10 1/17   Total Weekly Dose 23.75  mg 23.75  mg 25mg 25mg 25mg 23.75  mg  21.25 mg 21.25 21.25mg 18.75 20 mg   INR 1.9 1.9 2.4 3.1 3.3 3.4  3.5 2.9 4.1 3.0 2.3   Notes   Boost x1 less GLV   1x hold   1x hold      Date 1/24 2/7 2/14 2/21 2/28 3/3 3/6 3/9 3/13 3/16 3/17 3/20  3/25 4/1 4/8   Total Weekly Dose 20mg 20mg 21.75 mg 20mg  21.25  mg 20mg 18.75  mg 15mg 15mg 16.25  mg 16.25  mg 13.75  mg 16.25  mg 17.5mg 17.5mg   INR 2.4 1.9 3.5 1.6 2.4 3.6 4.6 3.4 3.2 4.5 2.3 2.0  1.9 2.2 1.9   Notes      doxy Bumex,  d/c lasix  doxy doxy complete 3/5 x1 hold; x1 decr x1 decr x2 decr 1x hold 1 decr Dose inc  fall     Date 4/15 4/22 4/27 5/1 5/5 5/8 5/12 5/15 5/19 5/26 6/1 6/8 6/16 6/23 6/30  7/6   Total Weekly Dose 17.5mg 17.5mg 16.25mg 15mg 15mg 15mg 13.75 15mg 18.75mg 15mg 15mg 17.5mg 17.5mg 17.5mg 18.75  mg  18.75 mg   INR 2.6 2.7 3.7 3.3 2.9 3.2 1.4 1.6 2.7 1.9 1.9 2.3 2.1 1.6 1.9  1.9   Notes   Tramadol  1x dose red tramadol 2x dose red off tramadol 1x dec 1x dec              Date  7/14 7/21 7/28  8/3 8/7       Total Weekly Dose 20 mg  20 mg 20mg 21.25 mg 22.5 mg       INR 2.1 1.5 1.7  1.4 1.9       Notes   1x dec  1 boost 1 boost        Phone Interview:  Tablet Strength: 2.5mg and 7.5mg tablet  Patient Contact Info: Mikayla Chaves, Cell (daughter) [phone]  [facility] in [place] Contact: Danyelle ([address] nurse) [phone] **preferred**    Patient Findings    Negatives:  Signs/symptoms of thrombosis, Signs/symptoms of bleeding, Laboratory test error suspected, Change in health, Change in alcohol use, Change in activity, Upcoming invasive procedure, Emergency department visit, Upcoming dental procedure, Missed doses, Extra doses, Change in medications, Change in diet/appetite, Hospital admission, Bruising, Other complaints   Comments:  Derrek Plasencia RN (Erinn usually works on the other side), reviewed MAR and confirmed her dosing (matched tracker).     Will recheck on Tuesday next week..  Due to her age, will be conservative with boost at this time.         Plan:   1. INR remains SUBtherapeutic today at 1.9, despite boosted dose.   Instructed RN Erinn, to have Ms. Deutsch boost warfarin to 5 mg tonight (Friday 8/7), then continue 2.5 mg daily except 3.75 mg on TueFri .   2. Repeat INR on Tuesday 8/11  3. Verbal information provided over the phone. Adelaide Deutsch's RN Janet RBV dosing instructions, expresses understanding by teach back, and has no further questions at this time.    Loco Freedman, PharmD  08/07/20   08:54

## 2020-08-11 NOTE — PROGRESS NOTES
Anticoagulation Clinic - Remote Progress Note  [facility]  Frequency of Monitorin-4x a a month    Indication: chronic afib  Referring Provider: Luiza (last seen 19  next visit 20)  Goal INR: 2.0-3.0  Current Drug Interactions: Omeprazole, MVI, CoQ10, Levothyroxine, celexa  [id number]: 4 [HTN, Age>75, Female]  HAS-BLED: 2 (HTN, Elderly), fall 20  Clinic: 2018    Diet: Green beans, glenroy slaw once week, iceberg salad ~2x a week (4/15/20)  Alcohol: None  Tobacco: None  OTC Pain Medication: Tylenol    INR History:  Date 12/11 12/20 1/3/18 1/18 1/31 2/14 2/22 2/28 3/7 3/23 4/2 4/11 4/25   Total Weekly Dose 27.5 mg 27.5 mg 27.5  mg 27.5 mg   27.5mg 27.5 mg 25mg 27.5mg 27.5  mg 27.5 mg 27.5mg 27.5 mg 27.5  mg   INR 2.7 3.0 3.0 2.7 2.5 3.5 2.2 2.9 2.4 3.1 2.2 2.9 2.8   Notes       Held 1x    1 decr dose       Date 5/10 5/17 5/23 6/6 6/13 6/20 6/22 6/27 7/1 7/5   Total Weekly Dose 25mg 27.5mg 27.5mg 26.25mg 27.5mg 25mg 20mg 20mg 20mg 17.5mg   INR 1.9 2.3 2.4 3.1 3.8; 4.0 3.9 2.4 3.2 2.1 2.6   Notes missed 1x dose, self adj   Less GLV; daughter [facility]; Less GLV    More GLV; dose decr.      Date 7/11 7/18 7/25 8/1 8/7 8/15 8/22 9/5 9/10 9/17   Total Weekly Dose 17.5mg 18.75mg 18.75mg 20mg 22.5mg 20mg 20mg 20mg 22.5mg 22.5mg   INR 1.9 1.7 2.0 1.5 1.8 2.1 2.1 1.5 1.7 1.5   Notes     boost boost   boost      Date 9/24 10/1 10/15 10/22 10/29 11/5 11/19 11/26 12/10 12/24 12/31   Total Weekly Dose 23.75  mg 22.5mg 22.5mg 18.75mg 22.5mg 23.75mg 23.75mg 23.75mg 23.75mg 22.5mg 23.75mg   INR 2.1 2.1 2.7 1.8 1.9 2.1 2.4 2.5 2.2 1.9 2.3   Notes boost   missed 10/15 x 1  1x incr dose    Mis-dose      Date 1/14/19 1/28 2/11 2/22 2/28 3/7 3/20 3/27 4/10 4/24 5/1   Total Weekly Dose 23.75  mg 23.75  mg 23.75  mg 23.75  mg 23.75  mg 23.75  mg 23.75  mg 23.75  mg 23.75  mg 23.75  mg 23.75  mg   INR 2.3 2.0 2.1 2.4 2.8 2.4 2.3 2.8 2.9 1.8 1.9   Notes  1 miss     fall         Date 5/8 5/15 5/22 6/5  6/19  6/26 7/3 7/8 7/15 7/22 8/5   Total Weekly Dose 25mg 23.75  mg 23.75  mg 23.75  mg 23.75  mg 25 mg 23.75mg 25mg 25mg 25mg 25mg   INR 2.2 2.6 2.5 2.1  2.0 2.6 1.5 2.0 2.2 2.1 1.8   Notes     incr GLV at [facility]  missed dose bruising        Date 8/15 8/26 9/3 9/10 9/24 10/1 10/8 10/15 10/21 10/28 11/4   Total Weekly Dose 25mg 25mg 25mg 25mg 25mg 26.25  mg 25mg 21.25  mg 22.5mg 23.75  mg 23.75  mg   INR 3.2 2.6 2.6 2.4 1.6 2.3 4.8 (POCT)  3.75 (ryan) 4.0 2.3 2.5 2.4   Notes      1x boost          Date 11/11 11/15 11/21 12/3 12/10 12/17  12/23 12/30 1/6/20 1/10 1/17   Total Weekly Dose 23.75  mg 23.75  mg 25mg 25mg 25mg 23.75  mg  21.25 mg 21.25 21.25mg 18.75 20 mg   INR 1.9 1.9 2.4 3.1 3.3 3.4  3.5 2.9 4.1 3.0 2.3   Notes   Boost x1 less GLV   1x hold   1x hold      Date 1/24 2/7 2/14 2/21 2/28 3/3 3/6 3/9 3/13 3/16 3/17 3/20  3/25 4/1 4/8   Total Weekly Dose 20mg 20mg 21.75 mg 20mg  21.25  mg 20mg 18.75  mg 15mg 15mg 16.25  mg 16.25  mg 13.75  mg 16.25  mg 17.5mg 17.5mg   INR 2.4 1.9 3.5 1.6 2.4 3.6 4.6 3.4 3.2 4.5 2.3 2.0  1.9 2.2 1.9   Notes      doxy Bumex,  d/c lasix  doxy doxy complete 3/5 x1 hold; x1 decr x1 decr x2 decr 1x hold 1 decr Dose inc  fall     Date 4/15 4/22 4/27 5/1 5/5 5/8 5/12 5/15 5/19 5/26 6/1 6/8 6/16 6/23 6/30  7/6   Total Weekly Dose 17.5mg 17.5mg 16.25mg 15mg 15mg 15mg 13.75 15mg 18.75mg 15mg 15mg 17.5mg 17.5mg 17.5mg 18.75  mg  18.75 mg   INR 2.6 2.7 3.7 3.3 2.9 3.2 1.4 1.6 2.7 1.9 1.9 2.3 2.1 1.6 1.9  1.9   Notes   Tramadol  1x dose red tramadol 2x dose red off tramadol 1x dec 1x dec              Date  7/14 7/21 7/28  8/3 8/7 8/11      Total Weekly Dose 20 mg  20 mg 20mg 21.25 mg 22.5 mg 23.75mg 22.5mg     INR 2.1 1.5 1.7  1.4 1.9 2.2      Notes   1x dec  Boost drinks  1 boost 1 boost        Phone Interview:  Tablet Strength: 2.5mg and 7.5mg tablet  Patient Contact Info: Mikayla Anastacio, Cell (daughter) [phone]  [facility] in [place] Contact: Danyelle (or [address] [place] [address] nurse) [phone]  **preferred**    Patient Findings     Positives:  Change in diet/appetite   Negatives:  Signs/symptoms of thrombosis, Signs/symptoms of bleeding, Laboratory test error suspected, Change in health, Change in alcohol use, Change in activity, Upcoming invasive procedure, Emergency department visit, Upcoming dental procedure, Missed doses, Extra doses, Change in medications, Hospital admission, Bruising, Other complaints   Comments:  Per MARIANO Portillo patient has been getting fortified shakes 3x daily with her meals; was unsure if these contain vitamin K but she checked and these contain boost and ice cream. She said these were started 7/28. Reports she drinks about 50% of these.  Does not appear clinic was aware of this diet change and would explain recent increase in warfarin dosing needs.          Plan:   1. INR therapeutic today at 2.2, large increase in one week.  Instructed MARIANO Portillo, to have Ms. Deutsch take 2.5 mg daily except 5 mg on TueFri (22.5mg/week) .   2. Repeat INR on Tuesday 8/18  3. Verbal information provided over the phone. Adelaide Deutsch's MARIANO Gonzales RBV dosing instructions, expresses understanding by teach back, and has no further questions at this time.    Erinn Aldrich, PharmD.  08/11/20   11:17

## 2020-08-18 NOTE — PROGRESS NOTES
Anticoagulation Clinic - Remote Progress Note  [facility]  Frequency of Monitorin-4x a a month    Indication: chronic afib  Referring Provider: Luiza (last seen 19  next visit 20)  Goal INR: 2.0-3.0  Current Drug Interactions: Omeprazole, MVI, CoQ10, Levothyroxine, celexa  [id number]: 4 [HTN, Age>75, Female]  HAS-BLED: 2 (HTN, Elderly), fall 20  Clinic: 2018    Diet: Green beans, glenroy slaw 1x/wk, iceberg salad ~2x a week (4/15/20); 20:  fortified shakes 3x daily with her meals; (these contain boost and ice cream) ~ 50%   Alcohol: None  Tobacco: None  OTC Pain Medication: Tylenol    INR History:  Date 12/11 12/20 1/3/18 1/18 1/31 2/14 2/22 2/28 3/7 3/23 4/2 4/11 4/25   Total Weekly Dose 27.5 mg 27.5 mg 27.5  mg 27.5 mg   27.5mg 27.5 mg 25mg 27.5mg 27.5  mg 27.5 mg 27.5mg 27.5 mg 27.5  mg   INR 2.7 3.0 3.0 2.7 2.5 3.5 2.2 2.9 2.4 3.1 2.2 2.9 2.8   Notes       Held 1x    1 decr dose       Date 5/10 5/17 5/23 6/6 6/13 6/20 6/22 6/27 7/1 7/5   Total Weekly Dose 25mg 27.5mg 27.5mg 26.25mg 27.5mg 25mg 20mg 20mg 20mg 17.5mg   INR 1.9 2.3 2.4 3.1 3.8; 4.0 3.9 2.4 3.2 2.1 2.6   Notes missed 1x dose, self adj   Less GLV; [facility]; Less GLV    More GLV; dose decr.      Date 7/11 7/18 7/25 8/1 8/7 8/15 8/22 9/5 9/10 9/17   Total Weekly Dose 17.5mg 18.75mg 18.75mg 20mg 22.5mg 20mg 20mg 20mg 22.5mg 22.5mg   INR 1.9 1.7 2.0 1.5 1.8 2.1 2.1 1.5 1.7 1.5   Notes     boost boost   boost      Date 9/24 10/1 10/15 10/22 10/29 11/5 11/19 11/26 12/10 12/24 12/31   Total Weekly Dose 23.75  mg 22.5mg 22.5mg 18.75mg 22.5mg 23.75mg 23.75mg 23.75mg 23.75mg 22.5mg 23.75mg   INR 2.1 2.1 2.7 1.8 1.9 2.1 2.4 2.5 2.2 1.9 2.3   Notes boost   missed 10/15 x 1  1x incr dose    Mis-dose      Date 1/14/19 1/28 2/11 2/22 2/28 3/7 3/20 3/27 4/10 4/24 5/1   Total Weekly Dose 23.75  mg 23.75  mg 23.75  mg 23.75  mg 23.75  mg 23.75  mg 23.75  mg 23.75  mg 23.75  mg 23.75  mg 23.75  mg   INR 2.3 2.0 2.1  2.4 2.8 2.4 2.3 2.8 2.9 1.8 1.9   Notes  1 miss     fall         Date 5/8 5/15 5/22 6/5  6/19 6/26 7/3 7/8 7/15 7/22 8/5   Total Weekly Dose 25mg 23.75  mg 23.75  mg 23.75  mg 23.75  mg 25 mg 23.75mg 25mg 25mg 25mg 25mg   INR 2.2 2.6 2.5 2.1  2.0 2.6 1.5 2.0 2.2 2.1 1.8   Notes     incr GLV at [place]  missed dose bruising        Date 8/15 8/26 9/3 9/10 9/24 10/1 10/8 10/15 10/21 10/28 11/4   Total Weekly Dose 25mg 25mg 25mg 25mg 25mg 26.25  mg 25mg 21.25  mg 22.5mg 23.75  mg 23.75  mg   INR 3.2 2.6 2.6 2.4 1.6 2.3 4.8 (POCT)  3.75 (ryan) 4.0 2.3 2.5 2.4   Notes      1x boost          Date 11/11 11/15 11/21 12/3 12/10 12/17  12/23 12/30 1/6/20 1/10 1/17   Total Weekly Dose 23.75  mg 23.75  mg 25mg 25mg 25mg 23.75  mg  21.25 mg 21.25 21.25mg 18.75 20 mg   INR 1.9 1.9 2.4 3.1 3.3 3.4  3.5 2.9 4.1 3.0 2.3   Notes   Boost x1 less GLV   1x hold   1x hold      Date 1/24 2/7 2/14 2/21 2/28 3/3 3/6 3/9 3/13 3/16 3/17 3/20  3/25 4/1 4/8   Total Weekly Dose 20mg 20mg 21.75 mg 20mg  21.25  mg 20mg 18.75  mg 15mg 15mg 16.25  mg 16.25  mg 13.75  mg 16.25  mg 17.5mg 17.5mg   INR 2.4 1.9 3.5 1.6 2.4 3.6 4.6 3.4 3.2 4.5 2.3 2.0  1.9 2.2 1.9   Notes      doxy Bumex,  d/c lasix  doxy doxy complete 3/5 x1 hold; x1 decr x1 decr x2 decr 1x hold 1 decr Dose inc  fall     Date 4/15 4/22 4/27 5/1 5/5 5/8 5/12 5/15 5/19 5/26 6/1 6/8 6/16 6/23 6/30  7/6   Total Weekly Dose 17.5mg 17.5mg 16.25mg 15mg 15mg 15mg 13.75 15mg 18.75mg 15mg 15mg 17.5mg 17.5mg 17.5mg 18.75  mg  18.75 mg   INR 2.6 2.7 3.7 3.3 2.9 3.2 1.4 1.6 2.7 1.9 1.9 2.3 2.1 1.6 1.9  1.9   Notes   Tramadol  1x dose red tramadol 2x dose red off tramadol 1x dec 1x dec              Date  7/14 7/21 7/28  8/3 8/7 8/11  8/18           Total Weekly Dose 20 mg  20 mg 20mg 21.25 mg 22.5 mg 23.75mg 22.5mg           INR 2.1 1.5 1.7  1.4 1.9 2.2  1.8           Notes   1x dec  Boost drinks  1 boost 1 boost              Phone Interview:  Tablet Strength: 2.5mg and 7.5mg tablet  Patient Contact  Info: Mikayla Chaves, Cell (daughter) [phone]  [facility] in [place] Contact: Danyelle (or [address] [place] [address] nurse) [phone] **preferred**    Patient Findings   Negatives:  Signs/symptoms of thrombosis, Signs/symptoms of bleeding, Laboratory test error suspected, Change in health, Change in alcohol use, Change in activity, Upcoming invasive procedure, Emergency department visit, Upcoming dental procedure, Missed doses, Extra doses, Change in medications, Change in diet/appetite, Hospital admission, Bruising, Other complaints   Comments:  Spoke with MARIANO Bassett - she continues  fortified shakes 3x daily with her meals;  these contain boost and ice cream.(started 7/28)  She said that she drinks most of them as she likes sweets. Requested that they keep clinic informed of any changes in intake.   Otherwise, above findings negative     Plan:   1. INR is sub therapeutic today at 1.8.  Instructed MARIANO Bassett, to have Ms. Deutsch take warfarin 2.5 mg daily except 5 mg on TueThursSat (25 mg/week) .   2. Repeat INR on Tuesday 8/25  3. Verbal information provided over the phone. Adelaide Deutsch's RN Brendia RBV dosing instructions, expresses understanding by teach back, and has no further questions at this time.    Reena Moore, PharmD  08/18/20   08:57

## 2020-08-25 NOTE — PROGRESS NOTES
Anticoagulation Clinic - Remote Progress Note  [facility]  Frequency of Monitorin-4x a a month    Indication: chronic afib  Referring Provider: Luiza (last seen 19  next visit 20)  Goal INR: 2.0-3.0  Current Drug Interactions: Omeprazole, MVI, CoQ10, Levothyroxine, celexa  [id number]: 4 [HTN, Age>75, Female]  HAS-BLED: 2 (HTN, Elderly), fall 20  Clinic: 2018    Diet: Green beans, glenroy slaw 1x/wk, iceberg salad ~2x a week (4/15/20); 20:  fortified shakes 3x daily with her meals; (these contain boost and ice cream) ~ 50%   Alcohol: None  Tobacco: None  OTC Pain Medication: Tylenol    INR History:  Date 12/11 12/20 1/3/18 1/18 1/31 2/14 2/22 2/28 3/7 3/23 4/2 4/11 4/25   Total Weekly Dose 27.5 mg 27.5 mg 27.5  mg 27.5 mg   27.5mg 27.5 mg 25mg 27.5mg 27.5  mg 27.5 mg 27.5mg 27.5 mg 27.5  mg   INR 2.7 3.0 3.0 2.7 2.5 3.5 2.2 2.9 2.4 3.1 2.2 2.9 2.8   Notes       Held 1x    1 decr dose       Date 5/10 5/17 5/23 6/6 6/13 6/20 6/22 6/27 7/1 7/5   Total Weekly Dose 25mg 27.5mg 27.5mg 26.25mg 27.5mg 25mg 20mg 20mg 20mg 17.5mg   INR 1.9 2.3 2.4 3.1 3.8; 4.0 3.9 2.4 3.2 2.1 2.6   Notes missed 1x dose, self adj   Less GLV; daughter [facility]; Less GLV    More GLV; dose decr.      Date 7/11 7/18 7/25 8/1 8/7 8/15 8/22 9/5 9/10 9/17   Total Weekly Dose 17.5mg 18.75mg 18.75mg 20mg 22.5mg 20mg 20mg 20mg 22.5mg 22.5mg   INR 1.9 1.7 2.0 1.5 1.8 2.1 2.1 1.5 1.7 1.5   Notes     boost boost   boost      Date 9/24 10/1 10/15 10/22 10/29 11/5 11/19 11/26 12/10 12/24 12/31   Total Weekly Dose 23.75  mg 22.5mg 22.5mg 18.75mg 22.5mg 23.75mg 23.75mg 23.75mg 23.75mg 22.5mg 23.75mg   INR 2.1 2.1 2.7 1.8 1.9 2.1 2.4 2.5 2.2 1.9 2.3   Notes boost   missed 10/15 x 1  1x incr dose    Mis-dose      Date 1/14/19 1/28 2/11 2/22 2/28 3/7 3/20 3/27 4/10 4/24 5/1   Total Weekly Dose 23.75  mg 23.75  mg 23.75  mg 23.75  mg 23.75  mg 23.75  mg 23.75  mg 23.75  mg 23.75  mg 23.75  mg 23.75  mg   INR 2.3 2.0 2.1  2.4 2.8 2.4 2.3 2.8 2.9 1.8 1.9   Notes  1 miss     fall         Date 5/8 5/15 5/22 6/5  6/19 6/26 7/3 7/8 7/15 7/22 8/5   Total Weekly Dose 25mg 23.75  mg 23.75  mg 23.75  mg 23.75  mg 25 mg 23.75mg 25mg 25mg 25mg 25mg   INR 2.2 2.6 2.5 2.1  2.0 2.6 1.5 2.0 2.2 2.1 1.8   Notes     incr GLV at [place]  missed dose bruising        Date 8/15 8/26 9/3 9/10 9/24 10/1 10/8 10/15 10/21 10/28 11/4   Total Weekly Dose 25mg 25mg 25mg 25mg 25mg 26.25  mg 25mg 21.25  mg 22.5mg 23.75  mg 23.75  mg   INR 3.2 2.6 2.6 2.4 1.6 2.3 4.8 (POCT)  3.75 (ryan) 4.0 2.3 2.5 2.4   Notes      1x boost          Date 11/11 11/15 11/21 12/3 12/10 12/17  12/23 12/30 1/6/20 1/10 1/17   Total Weekly Dose 23.75  mg 23.75  mg 25mg 25mg 25mg 23.75  mg  21.25 mg 21.25 21.25mg 18.75 20 mg   INR 1.9 1.9 2.4 3.1 3.3 3.4  3.5 2.9 4.1 3.0 2.3   Notes   Boost x1 less GLV   1x hold   1x hold      Date 1/24 2/7 2/14 2/21 2/28 3/3 3/6 3/9 3/13 3/16 3/17 3/20  3/25 4/1 4/8   Total Weekly Dose 20mg 20mg 21.75 mg 20mg  21.25  mg 20mg 18.75  mg 15mg 15mg 16.25  mg 16.25  mg 13.75  mg 16.25  mg 17.5mg 17.5mg   INR 2.4 1.9 3.5 1.6 2.4 3.6 4.6 3.4 3.2 4.5 2.3 2.0  1.9 2.2 1.9   Notes      doxy Bumex,  d/c lasix  doxy doxy complete 3/5 x1 hold; x1 decr x1 decr x2 decr 1x hold 1 decr Dose inc  fall     Date 4/15 4/22 4/27 5/1 5/5 5/8 5/12 5/15 5/19 5/26 6/1 6/8 6/16 6/23 6/30  7/6   Total Weekly Dose 17.5mg 17.5mg 16.25mg 15mg 15mg 15mg 13.75 15mg 18.75mg 15mg 15mg 17.5mg 17.5mg 17.5mg 18.75  mg  18.75 mg   INR 2.6 2.7 3.7 3.3 2.9 3.2 1.4 1.6 2.7 1.9 1.9 2.3 2.1 1.6 1.9  1.9   Notes   Tramadol  1x dose red tramadol 2x dose red off tramadol 1x dec 1x dec              Date  7/14 7/21 7/28  8/3 8/7 8/11  8/18 8/25          Total Weekly Dose 20 mg  20 mg 20mg 21.25 mg 22.5 mg 23.75mg 22.5mg 25mg 23.75         INR 2.1 1.5 1.7  1.4 1.9 2.2  1.8 3.1          Notes   1x dec  Boost drinks  1 boost 1 boost  bactrim            Phone Interview:  Tablet Strength: 2.5mg and 7.5mg  tablet  Patient Contact Info: Mikayla Chaves, Cell (daughter) [phone]  [facility] in [place] Contact: Danyelle (or [address] [place] [address] nurse) [phone] **preferred**    Patient Findings     Positives:  Change in medications   Negatives:  Signs/symptoms of thrombosis, Signs/symptoms of bleeding, Laboratory test error suspected, Change in health, Change in alcohol use, Change in activity, Upcoming invasive procedure, Emergency department visit, Upcoming dental procedure, Missed doses, Extra doses, Change in diet/appetite, Hospital admission, Bruising, Other complaints   Comments:  Spoke with MARIANO Portillo - she continue fortified shakes 3x daily with her meals;  these contain boost and ice cream.(started 7/28)     Started bactrim 800/160 1qd (last dose today 8/25) 8/19 for UTI   Also began aricept qd   Lindsey agreeable to flag bactrim interaction incase it is restarted in the future              Plan:   1. INR is supratherapeutic today at 3.1.  Instructed MARIANO Bassett, to have Ms. Deutsch reduce tonight's dose to 1.25mg then begin warfarin 3.75 mg daily except 2.5 mg on MonFriday (23.75 mg/week) .   2. Repeat INR on Friday 8/28 due to bactrim interaction  3. Verbal information provided over the phone. Adelaide Deutsch's RN Janet RBV dosing instructions, expresses understanding by teach back, and has no further questions at this time.    Erinn Aldrich, PharmD.  08/25/20   11:20

## 2020-09-02 NOTE — PROGRESS NOTES
Anticoagulation Clinic - Remote Progress Note  [facility]  Frequency of Monitorin-4x a a month    Indication: chronic afib  Referring Provider: Luiza (last seen 19  next visit 20)  Goal INR: 2.0-3.0  Current Drug Interactions: Omeprazole, MVI, CoQ10, Levothyroxine, celexa  [id number]: 4 [HTN, Age>75, Female]  HAS-BLED: 2 (HTN, Elderly), fall 20  Clinic: 2018    Diet: Green beans, glenroy slaw 1x/wk, iceberg salad ~2x a week (4/15/20); 20:  fortified shakes 3x daily with her meals; (these contain boost and ice cream) ~ 50%   Alcohol: None  Tobacco: None  OTC Pain Medication: Tylenol    INR History:  Date 12/11 12/20 1/3/18 1/18 1/31 2/14 2/22 2/28 3/7 3/23 4/2 4/11 4/25   Total Weekly Dose 27.5 mg 27.5 mg 27.5  mg 27.5 mg   27.5mg 27.5 mg 25mg 27.5mg 27.5  mg 27.5 mg 27.5mg 27.5 mg 27.5  mg   INR 2.7 3.0 3.0 2.7 2.5 3.5 2.2 2.9 2.4 3.1 2.2 2.9 2.8   Notes       Held 1x    1 decr dose       Date 5/10 5/17 5/23 6/6 6/13 6/20 6/22 6/27 7/1 7/5   Total Weekly Dose 25mg 27.5mg 27.5mg 26.25mg 27.5mg 25mg 20mg 20mg 20mg 17.5mg   INR 1.9 2.3 2.4 3.1 3.8; 4.0 3.9 2.4 3.2 2.1 2.6   Notes missed 1x dose, self adj   Less GLV; daughter cook clinic; Less GLV    More GLV; dose decr.      Date 7/11 7/18 7/25 8/1 8/7 8/15 8/22 9/5 9/10 9/17   Total Weekly Dose 17.5mg 18.75mg 18.75mg 20mg 22.5mg 20mg 20mg 20mg 22.5mg 22.5mg   INR 1.9 1.7 2.0 1.5 1.8 2.1 2.1 1.5 1.7 1.5   Notes     boost boost   boost      Date 9/24 10/1 10/15 10/22 10/29 11/5 11/19 11/26 12/10 12/24 12/31   Total Weekly Dose 23.75  mg 22.5mg 22.5mg 18.75mg 22.5mg 23.75mg 23.75mg 23.75mg 23.75mg 22.5mg 23.75mg   INR 2.1 2.1 2.7 1.8 1.9 2.1 2.4 2.5 2.2 1.9 2.3   Notes boost   missed 10/15 x 1  1x incr dose    Mis-dose      Date 1/14/19 1/28 2/11 2/22 2/28 3/7 3/20 3/27 4/10 4/24 5/1   Total Weekly Dose 23.75  mg 23.75  mg 23.75  mg 23.75  mg 23.75  mg 23.75  mg 23.75  mg 23.75  mg 23.75  mg 23.75  mg 23.75  mg   INR 2.3 2.0 2.1  2.4 2.8 2.4 2.3 2.8 2.9 1.8 1.9   Notes  1 miss     fall         Date 5/8 5/15 5/22 6/5  6/19 6/26 7/3 7/8 7/15 7/22 8/5   Total Weekly Dose 25mg 23.75  mg 23.75  mg 23.75  mg 23.75  mg 25 mg 23.75mg 25mg 25mg 25mg 25mg   INR 2.2 2.6 2.5 2.1  2.0 2.6 1.5 2.0 2.2 2.1 1.8   Notes     incr GLV at [place]  missed dose bruising        Date 8/15 8/26 9/3 9/10 9/24 10/1 10/8 10/15 10/21 10/28 11/4   Total Weekly Dose 25mg 25mg 25mg 25mg 25mg 26.25  mg 25mg 21.25  mg 22.5mg 23.75  mg 23.75  mg   INR 3.2 2.6 2.6 2.4 1.6 2.3 4.8 (POCT)  3.75 (ryan) 4.0 2.3 2.5 2.4   Notes      1x boost          Date 11/11 11/15 11/21 12/3 12/10 12/17  12/23 12/30 1/6/20 1/10 1/17   Total Weekly Dose 23.75  mg 23.75  mg 25mg 25mg 25mg 23.75  mg  21.25 mg 21.25 21.25mg 18.75 20 mg   INR 1.9 1.9 2.4 3.1 3.3 3.4  3.5 2.9 4.1 3.0 2.3   Notes   Boost x1 less GLV   1x hold   1x hold      Date 1/24 2/7 2/14 2/21 2/28 3/3 3/6 3/9 3/13 3/16 3/17 3/20  3/25 4/1 4/8   Total Weekly Dose 20mg 20mg 21.75 mg 20mg  21.25  mg 20mg 18.75  mg 15mg 15mg 16.25  mg 16.25  mg 13.75  mg 16.25  mg 17.5mg 17.5mg   INR 2.4 1.9 3.5 1.6 2.4 3.6 4.6 3.4 3.2 4.5 2.3 2.0  1.9 2.2 1.9   Notes      doxy Bumex,  d/c lasix  doxy doxy complete 3/5 x1 hold; x1 decr x1 decr x2 decr 1x hold 1 decr Dose inc  fall     Date 4/15 4/22 4/27 5/1 5/5 5/8 5/12 5/15 5/19 5/26 6/1 6/8 6/16 6/23 6/30  7/6   Total Weekly Dose 17.5mg 17.5mg 16.25mg 15mg 15mg 15mg 13.75 15mg 18.75mg 15mg 15mg 17.5mg 17.5mg 17.5mg 18.75  mg  18.75 mg   INR 2.6 2.7 3.7 3.3 2.9 3.2 1.4 1.6 2.7 1.9 1.9 2.3 2.1 1.6 1.9  1.9   Notes   Tramadol  1x dose red tramadol 2x dose red off tramadol 1x dec 1x dec              Date  7/14 7/21 7/28  8/3 8/7 8/11  8/18 8/25 8/28 9/2        Total Weekly Dose 20 mg  20 mg 20mg 21.25 mg 22.5 mg 23.75mg 22.5mg 25mg 21.25 25mg        INR 2.1 1.5 1.7  1.4 1.9 2.2  1.8 3.1 1.5 1.3        Notes   1x dec  Boost drinks  1 boost 1 boost  bactrim            Phone Interview:  Tablet Strength:  2.5mg and 7.5mg tablet  Patient Contact Info: Mikayla Chaves, Cell (daughter) [phone]  [facility] [place] Contact: Danyelle (or [address] [place] [address] nurse) [phone] **preferred**    Patient Findings   Negatives:  Signs/symptoms of thrombosis, Signs/symptoms of bleeding, Laboratory test error suspected, Change in health, Change in alcohol use, Change in activity, Upcoming invasive procedure, Emergency department visit, Upcoming dental procedure, Missed doses, Extra doses, Change in medications, Change in diet/appetite, Hospital admission, Bruising, Other complaints   Comments:  Per RN Danyelle, continues fortified shakes 3-4x daily but does not finsh them all. Finished bactrim 8/25.        Plan:   1. INR is SUBtherapeutic today at 1.3, decreased despite boosted dose.  Instructed MARIANO Bassett, to have Ms. Deutsch increase tonight's warfarin dose to 5mg then begin warfarin 3.75 mg daily until recheck.   2. Repeat INR Tuesday 9/8  3. Verbal information provided over the phone. Adelaide Deutsch's RN Brendia RBV dosing instructions, expresses understanding by teach back, and has no further questions at this time.    Erinn Aldrich, PharmD.  09/02/20   08:59

## 2020-09-08 NOTE — PROGRESS NOTES
Anticoagulation Clinic - Remote Progress Note  [facility]  Frequency of Monitorin-4x a a month    Indication: chronic afib  Referring Provider: Luiza (last seen 19  next visit 20)  Goal INR: 2.0-3.0  Current Drug Interactions: Omeprazole, MVI, CoQ10, Levothyroxine, celexa  [id number]: 4 [HTN, Age>75, Female]  HAS-BLED: 2 (HTN, Elderly), fall 20  Clinic: 2018    Diet: Green beans, glenroy slaw 1x/wk, iceberg salad ~2x a week (4/15/20); 20:  fortified shakes 3x daily with her meals; (these contain boost and ice cream) ~ 50%   Alcohol: None  Tobacco: None  OTC Pain Medication: Tylenol    INR History:  Date 12/11 12/20 1/3/18 1/18 1/31 2/14 2/22 2/28 3/7 3/23 4/2 4/11 4/25   Total Weekly Dose 27.5 mg 27.5 mg 27.5  mg 27.5 mg   27.5mg 27.5 mg 25mg 27.5mg 27.5  mg 27.5 mg 27.5mg 27.5 mg 27.5  mg   INR 2.7 3.0 3.0 2.7 2.5 3.5 2.2 2.9 2.4 3.1 2.2 2.9 2.8   Notes       Held 1x    1 decr dose       Date 5/10 5/17 5/23 6/6 6/13 6/20 6/22 6/27 7/1 7/5   Total Weekly Dose 25mg 27.5mg 27.5mg 26.25mg 27.5mg 25mg 20mg 20mg 20mg 17.5mg   INR 1.9 2.3 2.4 3.1 3.8; 4.0 3.9 2.4 3.2 2.1 2.6   Notes missed 1x dose, self adj   Less GLV; daughter cook clinic; Less GLV    More GLV; dose decr.      Date 7/11 7/18 7/25 8/1 8/7 8/15 8/22 9/5 9/10 9/17   Total Weekly Dose 17.5mg 18.75mg 18.75mg 20mg 22.5mg 20mg 20mg 20mg 22.5mg 22.5mg   INR 1.9 1.7 2.0 1.5 1.8 2.1 2.1 1.5 1.7 1.5   Notes     boost boost   boost      Date 9/24 10/1 10/15 10/22 10/29 11/5 11/19 11/26 12/10 12/24 12/31   Total Weekly Dose 23.75  mg 22.5mg 22.5mg 18.75mg 22.5mg 23.75mg 23.75mg 23.75mg 23.75mg 22.5mg 23.75mg   INR 2.1 2.1 2.7 1.8 1.9 2.1 2.4 2.5 2.2 1.9 2.3   Notes boost   missed 10/15 x 1  1x incr dose    Mis-dose      Date 1/14/19 1/28 2/11 2/22 2/28 3/7 3/20 3/27 4/10 4/24 5/1   Total Weekly Dose 23.75  mg 23.75  mg 23.75  mg 23.75  mg 23.75  mg 23.75  mg 23.75  mg 23.75  mg 23.75  mg 23.75  mg 23.75  mg   INR 2.3 2.0 2.1  2.4 2.8 2.4 2.3 2.8 2.9 1.8 1.9   Notes  1 miss     fall         Date 5/8 5/15 5/22 6/5  6/19 6/26 7/3 7/8 7/15 7/22 8/5   Total Weekly Dose 25mg 23.75  mg 23.75  mg 23.75  mg 23.75  mg 25 mg 23.75mg 25mg 25mg 25mg 25mg   INR 2.2 2.6 2.5 2.1  2.0 2.6 1.5 2.0 2.2 2.1 1.8   Notes     incr GLV at [place]  missed dose bruising        Date 8/15 8/26 9/3 9/10 9/24 10/1 10/8 10/15 10/21 10/28 11/4   Total Weekly Dose 25mg 25mg 25mg 25mg 25mg 26.25  mg 25mg 21.25  mg 22.5mg 23.75  mg 23.75  mg   INR 3.2 2.6 2.6 2.4 1.6 2.3 4.8 (POCT)  3.75 (ryan) 4.0 2.3 2.5 2.4   Notes      1x boost          Date 11/11 11/15 11/21 12/3 12/10 12/17  12/23 12/30 1/6/20 1/10 1/17   Total Weekly Dose 23.75  mg 23.75  mg 25mg 25mg 25mg 23.75  mg  21.25 mg 21.25 21.25mg 18.75 20 mg   INR 1.9 1.9 2.4 3.1 3.3 3.4  3.5 2.9 4.1 3.0 2.3   Notes   Boost x1 less GLV   1x hold   1x hold      Date 1/24 2/7 2/14 2/21 2/28 3/3 3/6 3/9 3/13 3/16 3/17 3/20  3/25 4/1 4/8   Total Weekly Dose 20mg 20mg 21.75 mg 20mg  21.25  mg 20mg 18.75  mg 15mg 15mg 16.25  mg 16.25  mg 13.75  mg 16.25  mg 17.5mg 17.5mg   INR 2.4 1.9 3.5 1.6 2.4 3.6 4.6 3.4 3.2 4.5 2.3 2.0  1.9 2.2 1.9   Notes      doxy Bumex,  d/c lasix  doxy doxy complete 3/5 x1 hold; x1 decr x1 decr x2 decr 1x hold 1 decr Dose inc  fall     Date 4/15 4/22 4/27 5/1 5/5 5/8 5/12 5/15 5/19 5/26 6/1 6/8 6/16 6/23 6/30  7/6   Total Weekly Dose 17.5mg 17.5mg 16.25mg 15mg 15mg 15mg 13.75 15mg 18.75mg 15mg 15mg 17.5mg 17.5mg 17.5mg 18.75  mg  18.75 mg   INR 2.6 2.7 3.7 3.3 2.9 3.2 1.4 1.6 2.7 1.9 1.9 2.3 2.1 1.6 1.9  1.9   Notes   Tramadol  1x dose red tramadol 2x dose red off tramadol 1x dec 1x dec              Date  7/14 7/21 7/28  8/3 8/7 8/11  8/18 8/25 8/28 9/2 9/8       Total Weekly Dose 20 mg  20 mg 20mg 21.25 mg 22.5 mg 23.75mg 22.5mg 25mg 21.25 25mg 27.5mg 26.35      INR 2.1 1.5 1.7  1.4 1.9 2.2  1.8 3.1 1.5 1.3 2.5       Notes   1x dec  Boost drinks  1 boost 1 boost  bactrim  1xboost          Phone  Interview:  Tablet Strength: 2.5mg and 7.5mg tablet  Patient Contact Info: Mikayla Chaves, Cell (daughter) [phone]  [facility] in [place] Contact: Danyelle (or [address] [place] [address] nurse) [phone] **preferred**    Patient Findings   Positives:  Change in medications   Negatives:  Signs/symptoms of thrombosis, Signs/symptoms of bleeding, Laboratory test error suspected, Change in health, Change in alcohol use, Change in activity, Upcoming invasive procedure, Emergency department visit, Upcoming dental procedure, Missed doses, Extra doses, Change in diet/appetite, Hospital admission, Bruising, Other complaints   Comments:  MARIANO Mahan, reported that Ms. Deutsch restarted her Donepezil (ARICEPT) 5mg daily since 08/17/20. No major DDI found.   She continues fortified shakes 3-4x daily.     Otherwise, denies above findings.       Plan:   1. INR is therapeutic today at 2.5.  Instructed MARIANO Mahan, to have Ms. Deutsch continue warfarin 3.75 mg daily until recheck.   2. Repeat INR in one week, 9/15  3. Verbal information provided over the phone. Adelaide Deutsch's RN Brendia RBV dosing instructions, expresses understanding by teach back, and has no further questions at this time.    Christen Rodriguez, Pharmacy Intern.  09/08/20   09:22    IErinn, PharmD, have reviewed the note in full and agree with the assessment and plan.  09/08/20  11:55

## 2020-09-15 NOTE — PROGRESS NOTES
Anticoagulation Clinic - Remote Progress Note  [facility]  Frequency of Monitorin-4x a a month    Indication: chronic afib  Referring Provider: Luiza (last seen 19  next visit 20)  Goal INR: 2.0-3.0  Current Drug Interactions: Omeprazole, MVI, CoQ10, Levothyroxine, celexa  [id number]: 4 [HTN, Age>75, Female]  HAS-BLED: 2 (HTN, Elderly), fall 20  Clinic: 2018    Diet: Green beans, glenroy slaw 1x/wk, iceberg salad ~2x a week (4/15/20); 20:  fortified shakes 3x daily with her meals; (these contain boost and ice cream) ~ 50%   Alcohol: None  Tobacco: None  OTC Pain Medication: Tylenol    INR History:  Date 12/11 12/20 1/3/18 1/18 1/31 2/14 2/22 2/28 3/7 3/23 4/2 4/11 4/25   Total Weekly Dose 27.5 mg 27.5 mg 27.5  mg 27.5 mg   27.5mg 27.5 mg 25mg 27.5mg 27.5  mg 27.5 mg 27.5mg 27.5 mg 27.5  mg   INR 2.7 3.0 3.0 2.7 2.5 3.5 2.2 2.9 2.4 3.1 2.2 2.9 2.8   Notes       Held 1x    1 decr dose       Date 5/10 5/17 5/23 6/6 6/13 6/20 6/22 6/27 7/1 7/5   Total Weekly Dose 25mg 27.5mg 27.5mg 26.25mg 27.5mg 25mg 20mg 20mg 20mg 17.5mg   INR 1.9 2.3 2.4 3.1 3.8; 4.0 3.9 2.4 3.2 2.1 2.6   Notes missed 1x dose, self adj   Less GLV; daughter cook clinic; Less GLV    More GLV; dose decr.      Date 7/11 7/18 7/25 8/1 8/7 8/15 8/22 9/5 9/10 9/17   Total Weekly Dose 17.5mg 18.75mg 18.75mg 20mg 22.5mg 20mg 20mg 20mg 22.5mg 22.5mg   INR 1.9 1.7 2.0 1.5 1.8 2.1 2.1 1.5 1.7 1.5   Notes     boost boost   boost      Date 9/24 10/1 10/15 10/22 10/29 11/5 11/19 11/26 12/10 12/24 12/31   Total Weekly Dose 23.75  mg 22.5mg 22.5mg 18.75mg 22.5mg 23.75mg 23.75mg 23.75mg 23.75mg 22.5mg 23.75mg   INR 2.1 2.1 2.7 1.8 1.9 2.1 2.4 2.5 2.2 1.9 2.3   Notes boost   missed 10/15 x 1  1x incr dose    Mis-dose      Date 1/14/19 1/28 2/11 2/22 2/28 3/7 3/20 3/27 4/10 4/24 5/1   Total Weekly Dose 23.75  mg 23.75  mg 23.75  mg 23.75  mg 23.75  mg 23.75  mg 23.75  mg 23.75  mg 23.75  mg 23.75  mg 23.75  mg   INR 2.3 2.0 2.1  2.4 2.8 2.4 2.3 2.8 2.9 1.8 1.9   Notes  1 miss     fall         Date 5/8 5/15 5/22 6/5  6/19 6/26 7/3 7/8 7/15 7/22 8/5   Total Weekly Dose 25mg 23.75  mg 23.75  mg 23.75  mg 23.75  mg 25 mg 23.75mg 25mg 25mg 25mg 25mg   INR 2.2 2.6 2.5 2.1  2.0 2.6 1.5 2.0 2.2 2.1 1.8   Notes     incr GLV at [place]  missed dose bruising        Date 8/15 8/26 9/3 9/10 9/24 10/1 10/8 10/15 10/21 10/28 11/4   Total Weekly Dose 25mg 25mg 25mg 25mg 25mg 26.25  mg 25mg 21.25  mg 22.5mg 23.75  mg 23.75  mg   INR 3.2 2.6 2.6 2.4 1.6 2.3 4.8 (POCT)  3.75 (ryan) 4.0 2.3 2.5 2.4   Notes      1x boost          Date 11/11 11/15 11/21 12/3 12/10 12/17  12/23 12/30 1/6/20 1/10 1/17   Total Weekly Dose 23.75  mg 23.75  mg 25mg 25mg 25mg 23.75  mg  21.25 mg 21.25 21.25mg 18.75 20 mg   INR 1.9 1.9 2.4 3.1 3.3 3.4  3.5 2.9 4.1 3.0 2.3   Notes   Boost x1 less GLV   1x hold   1x hold      Date 1/24 2/7 2/14 2/21 2/28 3/3 3/6 3/9 3/13 3/16 3/17 3/20  3/25 4/1 4/8   Total Weekly Dose 20mg 20mg 21.75 mg 20mg  21.25  mg 20mg 18.75  mg 15mg 15mg 16.25  mg 16.25  mg 13.75  mg 16.25  mg 17.5mg 17.5mg   INR 2.4 1.9 3.5 1.6 2.4 3.6 4.6 3.4 3.2 4.5 2.3 2.0  1.9 2.2 1.9   Notes      doxy Bumex,  d/c lasix  doxy doxy complete 3/5 x1 hold; x1 decr x1 decr x2 decr 1x hold 1 decr Dose inc  fall     Date 4/15 4/22 4/27 5/1 5/5 5/8 5/12 5/15 5/19 5/26 6/1 6/8 6/16 6/23 6/30  7/6   Total Weekly Dose 17.5mg 17.5mg 16.25mg 15mg 15mg 15mg 13.75 15mg 18.75mg 15mg 15mg 17.5mg 17.5mg 17.5mg 18.75  mg  18.75 mg   INR 2.6 2.7 3.7 3.3 2.9 3.2 1.4 1.6 2.7 1.9 1.9 2.3 2.1 1.6 1.9  1.9   Notes   Tramadol  1x dose red tramadol 2x dose red off tramadol 1x dec 1x dec              Date  7/14 7/21 7/28  8/3 8/7 8/11  8/18 8/25 8/28 9/2 9/8 9/15      Total Weekly Dose 20 mg  20 mg 20mg 21.25 mg 22.5 mg 23.75mg 22.5mg 25mg 21.25 25mg 27.5mg 26.25      INR 2.1 1.5 1.7  1.4 1.9 2.2  1.8 3.1 1.5 1.3 2.5 2.2      Notes   1x dec  Boost drinks  1 boost 1 boost  bactrim  1xboost          Phone  Interview:  Tablet Strength: 2.5mg and 7.5mg tablet  Patient Contact Info: Mikayla Chaves, Cell (daughter) [phone]  [facility] in [place] Contact: Danyelle (or [address] [place] [address] nurse) [phone] **preferred**    Patient Findings   Negatives:  Signs/symptoms of thrombosis, Signs/symptoms of bleeding, Laboratory test error suspected, Change in health, Change in alcohol use, Change in activity, Upcoming invasive procedure, Emergency department visit, Upcoming dental procedure, Missed doses, Extra doses, Change in medications, Change in diet/appetite, Hospital admission, Bruising, Other complaints   Comments:  Danyelle RN, reports no change on her meds or diet, continues fortified shakes 3-4x daily.   Otherwise, other findings negative.       Plan:   1. INR is therapeutic today at 2.2.  Instructed MARIANO Bassett, to have Ms. Deutsch continue warfarin 3.75 mg daily until recheck.   2. Repeat INR in one week, 9/22  3. Verbal information provided over the phone. Adelaide Deutsch's RN Brendia RBV dosing instructions, expresses understanding by teach back, and has no further questions at this time.    Christen Rodriguez, Pharmacy Intern.  09/15/20   09:45 EDT      I, Erinn Aldrich, PharmD, have reviewed the note in full and agree with the assessment and plan.  09/15/20  10:33 EDT

## 2020-09-25 NOTE — TELEPHONE ENCOUNTER
Dr. Jarrett,     Ms. Deutsch had a significant fall on 9/21 and was taken to [facility] where she received oral Vit K. They were unable to determine dose. She was discharged on 9/21 with instructions to hold warfarin x 4 days and repeat INR on 9/25. INR is baseline today.     RN notes significant bruising and recent cognitive decline noted prior to her fall. Of note: at discharge pt placed on Tramadol BID, and increased her dose of Celexa and Aricept. Discussed DDI of tramadol and Celexa with RN.     She is currently on warfarin for chronic afib with [id number]= 4 (HTN, Age, Gender).     At this time, do you prefer to continue warfarin therapy? If so, will re-initiate anticoagulation therapy slowly due to DDI with recent medication changes and pt fall hx. If not, will call the nurse and D/C anticoagulation therapy.    Thank you,    Rhonda Singh, PharmD

## 2020-09-25 NOTE — PROGRESS NOTES
Anticoagulation Clinic - Remote Progress Note  [facility]  Frequency of Monitorin-4x a a month    Indication: chronic afib  Referring Provider: Luiza (last seen 19  next visit 20)  Goal INR: 2.0-3.0  Current Drug Interactions: Omeprazole, MVI, CoQ10, Levothyroxine, celexa  [id number]: 4 [HTN, Age>75, Female]  HAS-BLED: 2 (HTN, Elderly), fall 20  Clinic: 2018  Other:     Diet: Green beans, glenroy slaw 1x/wk, iceberg salad ~2x a week (4/15/20); 20:  fortified shakes 3x daily with her meals; (these contain boost and ice cream) ~ 50%   Alcohol: None  Tobacco: None  OTC Pain Medication: Tylenol    INR History:  Date 1/24 2/7 2/14 2/21 2/28 3/3 3/6 3/9 3/13 3/16 3/17 3/20  3/25 4/1 4/8   Total Weekly Dose 20mg 20mg 21.75 mg 20mg  21.25  mg 20mg 18.75  mg 15mg 15mg 16.25  mg 16.25  mg 13.75  mg 16.25  mg 17.5mg 17.5mg   INR 2.4 1.9 3.5 1.6 2.4 3.6 4.6 3.4 3.2 4.5 2.3 2.0  1.9 2.2 1.9   Notes      doxy Bumex,  d/c lasix  doxy doxy complete 3/5 x1 hold; x1 decr x1 decr x2 decr 1x hold 1 decr Dose inc  fall     Date 4/15 4/22 4/27 5/1 5/5 5/8 5/12 5/15 5/19 5/26 6/1 6/8 6/16 6/23 6/30  7/6   Total Weekly Dose 17.5mg 17.5mg 16.25mg 15mg 15mg 15mg 13.75 15mg 18.75mg 15mg 15mg 17.5mg 17.5mg 17.5mg 18.75  mg  18.75 mg   INR 2.6 2.7 3.7 3.3 2.9 3.2 1.4 1.6 2.7 1.9 1.9 2.3 2.1 1.6 1.9  1.9   Notes   Tramadol  1x dose red tramadol 2x dose red off tramadol 1x dec 1x dec              Date    8/3 8/7 8/11  8/18 8/25 8/28 9/2 9/8 9/15 9/21 9/25     Total Weekly Dose 20 mg  20 mg 20mg 21.25 mg 22.5 mg 23.75mg 22.5mg 25mg 21.25 25mg 27.5mg 26.25 26.25mg 11.25mg     INR 2.1 1.5 1.7  1.4 1.9 2.2  1.8 3.1 1.5 1.3 2.5 2.2 FALL 1.0     Notes   1x dec  Boost drinks  1 boost 1 boost  bactrim  1xboost   Vit K  Increase celexa; tramadol use; hold x4       Phone Interview:  Tablet Strength: 2.5mg and 7.5mg tablet  Patient Contact Info: Mikayla Chaves, Cell (daughter) [phone]  [facility] in  [place] Contact: Danyelle (or [address] [place] [address] nurse) [phone] **preferred**    Patient Findings  Positives:  Emergency department visit, Missed doses, Change in medications, Change in diet/appetite, Bruising, Other complaints   Negatives:  Signs/symptoms of thrombosis, Signs/symptoms of bleeding, Laboratory test error suspected, Change in health, Change in alcohol use, Change in activity, Upcoming invasive procedure, Upcoming dental procedure, Extra doses, Hospital admission   Comments:  Ms. Deutsch fell on 9/21 and has significant bruising. She received Vit K at [facility]. June RN, was unable to determine dose of Vit K that was received in the hospital. She was brought back to the facility on 9/21 (same day as fall) with orders to hold warfarin x 4 days and repeat INR on 9/25. She was started on Tramadol BID* (potential cause for increase in confusion); pt is confused and was confused on Sunday prior to fall. Celexa was increased 20mg** and Aricept was increased to 10mg QHS. DDI with warfarin. Discussed with RN. Ms. Deutsch has had a decrease in appetite.      Reached out to discuss risk vs. benefit of continuing anticoagulation therapy with Dr. Jarrett. Call RN back [phone] once receive response from Dr. Jarrett.     **Summary Selective Serotonin Reuptake Inhibitors may enhance the anticoagulant effect of Vitamin K Antagonists. Severity Moderate Reliability Rating Excellent   *Summary TraMADol may enhance the anticoagulant effect of Vitamin K Antagonists. Severity Moderate Reliability Rating Good   Patient Management Monitor for increased effects of vitamin K antagonists (eg, INR elevations, bleeding) when combined with tramado     Plan:   1. INR is baseline today at 1.0 following fall and Vit K administration. Please refer to patient findings above. Instructed MARIANO Bassett, to have Ms. Deutsch resume a decreased dose of warfarin 2.5mg daily until repeat INR on Monday. Have reached out to Dr. Jarrett to  determine if can d/c anticoagulation therapy in the patient or if she should continue. At this time, will await response.   2. Repeat INR 9/28 unless d/c anticoagulation therapy.  3. Verbal information provided over the phone. Adelaide Deutsch's RN Brendia RBV dosing instructions, expresses understanding by teach back, and has no further questions at this time.      Rhonda Singh, PharmD  09/25/2020  13:48 EDT

## 2020-09-28 NOTE — TELEPHONE ENCOUNTER
I talked with her daughter Mikayla.  We are all in agreement to discontinuing warfarin completely at this time.  Please let hospice know that we will not continue warfarin.  Thank you

## 2020-09-28 NOTE — TELEPHONE ENCOUNTER
June called back today ([phone]) and INR was 1.1 today. Will take 2.5mg tonight and await if pt should continue anticoagulation therapy. Will call back with further instruction     Hospice has been consulted. May be on Wednesday so that her daughter can be present.

## 2020-09-28 NOTE — PROGRESS NOTES
June called back today and INR was 1.1 will take 2.5mg tonight and await if pt should continue anticoagulation therapy. Hospice has been consulted. May be on Wednesday so that her daughter can be present.

## 2020-09-29 NOTE — TELEPHONE ENCOUNTER
Danyelle at [facility] has requested we fax copy of Dr. Jarrett's note stating patient should discontinue warfarin (FX: [phone]). She has been read Dr. Jarrett's note in full and has no further questions at this time.